# Patient Record
Sex: FEMALE | Race: WHITE | Employment: OTHER | URBAN - METROPOLITAN AREA
[De-identification: names, ages, dates, MRNs, and addresses within clinical notes are randomized per-mention and may not be internally consistent; named-entity substitution may affect disease eponyms.]

---

## 2017-11-20 ENCOUNTER — HOSPITAL ENCOUNTER (OUTPATIENT)
Dept: ULTRASOUND IMAGING | Age: 77
Discharge: HOME | End: 2017-11-20
Attending: NURSE PRACTITIONER | Admitting: NURSE PRACTITIONER

## 2017-11-20 DIAGNOSIS — M79.89 SWELLING OF LIMB: ICD-10-CM

## 2018-03-30 ENCOUNTER — HOSPITAL ENCOUNTER (OUTPATIENT)
Dept: MRI IMAGING | Age: 78
Discharge: HOME | End: 2018-03-30
Attending: NURSE PRACTITIONER | Admitting: NURSE PRACTITIONER

## 2018-03-30 DIAGNOSIS — R51.9 NONINTRACTABLE HEADACHE, UNSPECIFIED CHRONICITY PATTERN, UNSPECIFIED HEADACHE TYPE: ICD-10-CM

## 2019-02-14 ENCOUNTER — HOSPITAL ENCOUNTER (OUTPATIENT)
Dept: GENERAL RADIOLOGY | Age: 79
Discharge: HOME | End: 2019-02-14
Payer: MEDICAID

## 2019-02-14 ENCOUNTER — HOSPITAL ENCOUNTER (OUTPATIENT)
Age: 79
Discharge: HOME | End: 2019-02-14
Payer: MEDICAID

## 2019-02-14 DIAGNOSIS — G89.4 CHRONIC PAIN SYNDROME: ICD-10-CM

## 2019-02-14 PROCEDURE — 72100 X-RAY EXAM L-S SPINE 2/3 VWS: CPT

## 2019-02-14 PROCEDURE — 73030 X-RAY EXAM OF SHOULDER: CPT

## 2019-06-14 ENCOUNTER — APPOINTMENT (OUTPATIENT)
Dept: CT IMAGING | Age: 79
DRG: 134 | End: 2019-06-14
Payer: MEDICAID

## 2019-06-14 ENCOUNTER — APPOINTMENT (OUTPATIENT)
Dept: GENERAL RADIOLOGY | Age: 79
DRG: 134 | End: 2019-06-14
Payer: MEDICAID

## 2019-06-14 ENCOUNTER — HOSPITAL ENCOUNTER (INPATIENT)
Age: 79
LOS: 3 days | Discharge: HOME | DRG: 134 | End: 2019-06-18
Attending: EMERGENCY MEDICINE | Admitting: FAMILY MEDICINE
Payer: MEDICAID

## 2019-06-14 DIAGNOSIS — I26.99 OTHER ACUTE PULMONARY EMBOLISM WITHOUT ACUTE COR PULMONALE (HCC): Primary | ICD-10-CM

## 2019-06-14 LAB
ALBUMIN SERPL-MCNC: 3.4 GM/DL (ref 3.4–5)
ALP BLD-CCNC: 93 IU/L (ref 40–129)
ALT SERPL-CCNC: 10 U/L (ref 10–40)
ANION GAP SERPL CALCULATED.3IONS-SCNC: 13 MMOL/L (ref 4–16)
APTT: 34.2 SECONDS (ref 21.2–33)
AST SERPL-CCNC: 20 IU/L (ref 15–37)
BASOPHILS ABSOLUTE: 0.1 K/CU MM
BASOPHILS RELATIVE PERCENT: 0.5 % (ref 0–1)
BILIRUB SERPL-MCNC: 2 MG/DL (ref 0–1)
BUN BLDV-MCNC: 12 MG/DL (ref 6–23)
CALCIUM SERPL-MCNC: 9 MG/DL (ref 8.3–10.6)
CHLORIDE BLD-SCNC: 91 MMOL/L (ref 99–110)
CO2: 31 MMOL/L (ref 21–32)
CREAT SERPL-MCNC: 0.5 MG/DL (ref 0.6–1.1)
DIFFERENTIAL TYPE: ABNORMAL
EOSINOPHILS ABSOLUTE: 0 K/CU MM
EOSINOPHILS RELATIVE PERCENT: 0.4 % (ref 0–3)
GFR AFRICAN AMERICAN: >60 ML/MIN/1.73M2
GFR NON-AFRICAN AMERICAN: >60 ML/MIN/1.73M2
GLUCOSE BLD-MCNC: 120 MG/DL (ref 70–99)
HCT VFR BLD CALC: 40 % (ref 37–47)
HEMOGLOBIN: 12.8 GM/DL (ref 12.5–16)
IMMATURE NEUTROPHIL %: 0.6 % (ref 0–0.43)
INR BLD: 1.18 INDEX
LYMPHOCYTES ABSOLUTE: 0.6 K/CU MM
LYMPHOCYTES RELATIVE PERCENT: 5.4 % (ref 24–44)
MAGNESIUM: 1.1 MG/DL (ref 1.8–2.4)
MCH RBC QN AUTO: 32.4 PG (ref 27–31)
MCHC RBC AUTO-ENTMCNC: 32 % (ref 32–36)
MCV RBC AUTO: 101.3 FL (ref 78–100)
MONOCYTES ABSOLUTE: 1.9 K/CU MM
MONOCYTES RELATIVE PERCENT: 18.3 % (ref 0–4)
NUCLEATED RBC %: 0 %
PDW BLD-RTO: 15.4 % (ref 11.7–14.9)
PLATELET # BLD: 170 K/CU MM (ref 140–440)
PMV BLD AUTO: 10.8 FL (ref 7.5–11.1)
POTASSIUM SERPL-SCNC: ABNORMAL MMOL/L (ref 3.5–5.1)
PROTHROMBIN TIME: 13.4 SECONDS (ref 9.12–12.5)
RBC # BLD: 3.95 M/CU MM (ref 4.2–5.4)
SEGMENTED NEUTROPHILS ABSOLUTE COUNT: 7.6 K/CU MM
SEGMENTED NEUTROPHILS RELATIVE PERCENT: 74.8 % (ref 36–66)
SODIUM BLD-SCNC: 135 MMOL/L (ref 135–145)
TOTAL IMMATURE NEUTOROPHIL: 0.06 K/CU MM
TOTAL NUCLEATED RBC: 0 K/CU MM
TOTAL PROTEIN: 6.8 GM/DL (ref 6.4–8.2)
TROPONIN T: <0.01 NG/ML
WBC # BLD: 10.1 K/CU MM (ref 4–10.5)

## 2019-06-14 PROCEDURE — 6360000004 HC RX CONTRAST MEDICATION: Performed by: PHYSICIAN ASSISTANT

## 2019-06-14 PROCEDURE — 85730 THROMBOPLASTIN TIME PARTIAL: CPT

## 2019-06-14 PROCEDURE — 83735 ASSAY OF MAGNESIUM: CPT

## 2019-06-14 PROCEDURE — 96375 TX/PRO/DX INJ NEW DRUG ADDON: CPT

## 2019-06-14 PROCEDURE — 6360000002 HC RX W HCPCS: Performed by: PHYSICIAN ASSISTANT

## 2019-06-14 PROCEDURE — 71045 X-RAY EXAM CHEST 1 VIEW: CPT

## 2019-06-14 PROCEDURE — 2580000003 HC RX 258

## 2019-06-14 PROCEDURE — 93005 ELECTROCARDIOGRAM TRACING: CPT | Performed by: EMERGENCY MEDICINE

## 2019-06-14 PROCEDURE — 71275 CT ANGIOGRAPHY CHEST: CPT

## 2019-06-14 PROCEDURE — 84484 ASSAY OF TROPONIN QUANT: CPT

## 2019-06-14 PROCEDURE — 85025 COMPLETE CBC W/AUTO DIFF WBC: CPT

## 2019-06-14 PROCEDURE — 96365 THER/PROPH/DIAG IV INF INIT: CPT

## 2019-06-14 PROCEDURE — 99285 EMERGENCY DEPT VISIT HI MDM: CPT

## 2019-06-14 PROCEDURE — 94640 AIRWAY INHALATION TREATMENT: CPT

## 2019-06-14 PROCEDURE — 6370000000 HC RX 637 (ALT 250 FOR IP): Performed by: PHYSICIAN ASSISTANT

## 2019-06-14 PROCEDURE — 80053 COMPREHEN METABOLIC PANEL: CPT

## 2019-06-14 PROCEDURE — 2580000003 HC RX 258: Performed by: PHYSICIAN ASSISTANT

## 2019-06-14 PROCEDURE — 85610 PROTHROMBIN TIME: CPT

## 2019-06-14 RX ORDER — LIDOCAINE 4 G/G
1 PATCH TOPICAL ONCE
Status: DISCONTINUED | OUTPATIENT
Start: 2019-06-14 | End: 2019-06-15

## 2019-06-14 RX ORDER — MAGNESIUM SULFATE IN WATER 40 MG/ML
2 INJECTION, SOLUTION INTRAVENOUS ONCE
Status: COMPLETED | OUTPATIENT
Start: 2019-06-14 | End: 2019-06-15

## 2019-06-14 RX ORDER — ASPIRIN 81 MG/1
324 TABLET, CHEWABLE ORAL ONCE
Status: COMPLETED | OUTPATIENT
Start: 2019-06-14 | End: 2019-06-14

## 2019-06-14 RX ORDER — CYCLOBENZAPRINE HCL 5 MG
5 TABLET ORAL 2 TIMES DAILY PRN
Qty: 30 TABLET | Refills: 0 | Status: SHIPPED | OUTPATIENT
Start: 2019-06-14 | End: 2019-06-14 | Stop reason: CLARIF

## 2019-06-14 RX ORDER — POTASSIUM CHLORIDE 7.45 MG/ML
10 INJECTION INTRAVENOUS PRN
Status: DISCONTINUED | OUTPATIENT
Start: 2019-06-14 | End: 2019-06-18 | Stop reason: HOSPADM

## 2019-06-14 RX ORDER — SODIUM CHLORIDE 9 MG/ML
INJECTION, SOLUTION INTRAVENOUS
Status: COMPLETED
Start: 2019-06-14 | End: 2019-06-14

## 2019-06-14 RX ORDER — HEPARIN SODIUM 10000 [USP'U]/100ML
18 INJECTION, SOLUTION INTRAVENOUS CONTINUOUS
Status: DISCONTINUED | OUTPATIENT
Start: 2019-06-14 | End: 2019-06-15

## 2019-06-14 RX ORDER — HEPARIN SODIUM 1000 [USP'U]/ML
80 INJECTION, SOLUTION INTRAVENOUS; SUBCUTANEOUS ONCE
Status: COMPLETED | OUTPATIENT
Start: 2019-06-14 | End: 2019-06-15

## 2019-06-14 RX ORDER — HEPARIN SODIUM 1000 [USP'U]/ML
80 INJECTION, SOLUTION INTRAVENOUS; SUBCUTANEOUS PRN
Status: DISCONTINUED | OUTPATIENT
Start: 2019-06-14 | End: 2019-06-14

## 2019-06-14 RX ORDER — HEPARIN SODIUM 1000 [USP'U]/ML
40 INJECTION, SOLUTION INTRAVENOUS; SUBCUTANEOUS PRN
Status: DISCONTINUED | OUTPATIENT
Start: 2019-06-14 | End: 2019-06-15

## 2019-06-14 RX ORDER — SODIUM CHLORIDE 0.9 % (FLUSH) 0.9 %
10 SYRINGE (ML) INJECTION 2 TIMES DAILY
Status: DISCONTINUED | OUTPATIENT
Start: 2019-06-14 | End: 2019-06-18 | Stop reason: HOSPADM

## 2019-06-14 RX ORDER — IPRATROPIUM BROMIDE AND ALBUTEROL SULFATE 2.5; .5 MG/3ML; MG/3ML
1 SOLUTION RESPIRATORY (INHALATION) ONCE
Status: COMPLETED | OUTPATIENT
Start: 2019-06-14 | End: 2019-06-14

## 2019-06-14 RX ORDER — SODIUM CHLORIDE 9 MG/ML
INJECTION, SOLUTION INTRAVENOUS CONTINUOUS
Status: DISCONTINUED | OUTPATIENT
Start: 2019-06-14 | End: 2019-06-16

## 2019-06-14 RX ORDER — CYCLOBENZAPRINE HCL 10 MG
10 TABLET ORAL ONCE
Status: DISCONTINUED | OUTPATIENT
Start: 2019-06-14 | End: 2019-06-14

## 2019-06-14 RX ADMIN — MAGNESIUM SULFATE HEPTAHYDRATE 2 G: 40 INJECTION, SOLUTION INTRAVENOUS at 23:29

## 2019-06-14 RX ADMIN — SODIUM CHLORIDE 1000 ML: 9 INJECTION, SOLUTION INTRAVENOUS at 23:27

## 2019-06-14 RX ADMIN — Medication 10 ML: at 22:58

## 2019-06-14 RX ADMIN — IOPAMIDOL 80 ML: 755 INJECTION, SOLUTION INTRAVENOUS at 22:57

## 2019-06-14 RX ADMIN — IPRATROPIUM BROMIDE AND ALBUTEROL SULFATE 1 AMPULE: .5; 3 SOLUTION RESPIRATORY (INHALATION) at 22:25

## 2019-06-14 RX ADMIN — POTASSIUM CHLORIDE 10 MEQ: 10 INJECTION, SOLUTION INTRAVENOUS at 23:27

## 2019-06-14 RX ADMIN — ASPIRIN 81 MG 324 MG: 81 TABLET ORAL at 22:22

## 2019-06-14 ASSESSMENT — PAIN SCALES - GENERAL: PAINLEVEL_OUTOF10: 8

## 2019-06-15 ENCOUNTER — APPOINTMENT (OUTPATIENT)
Dept: ULTRASOUND IMAGING | Age: 79
DRG: 134 | End: 2019-06-15
Payer: MEDICAID

## 2019-06-15 PROBLEM — I26.99 PULMONARY EMBOLISM AND INFARCTION (HCC): Status: ACTIVE | Noted: 2019-06-15

## 2019-06-15 LAB
APTT: 35.2 SECONDS (ref 21.2–33)
APTT: >240 SECONDS (ref 21.2–33)
HCT VFR BLD CALC: 36.5 % (ref 37–47)
HEMOGLOBIN: 11.4 GM/DL (ref 12.5–16)
MAGNESIUM: 2.5 MG/DL (ref 1.8–2.4)
MCH RBC QN AUTO: 31.9 PG (ref 27–31)
MCHC RBC AUTO-ENTMCNC: 31.2 % (ref 32–36)
MCV RBC AUTO: 102.2 FL (ref 78–100)
PDW BLD-RTO: 15.4 % (ref 11.7–14.9)
PLATELET # BLD: 176 K/CU MM (ref 140–440)
PMV BLD AUTO: 10.9 FL (ref 7.5–11.1)
POTASSIUM SERPL-SCNC: 3.6 MMOL/L (ref 3.5–5.1)
POTASSIUM SERPL-SCNC: ABNORMAL MMOL/L (ref 3.5–5.1)
RBC # BLD: 3.57 M/CU MM (ref 4.2–5.4)
TROPONIN T: <0.01 NG/ML
WBC # BLD: 9 K/CU MM (ref 4–10.5)

## 2019-06-15 PROCEDURE — 94761 N-INVAS EAR/PLS OXIMETRY MLT: CPT

## 2019-06-15 PROCEDURE — 2580000003 HC RX 258: Performed by: EMERGENCY MEDICINE

## 2019-06-15 PROCEDURE — 2580000003 HC RX 258: Performed by: FAMILY MEDICINE

## 2019-06-15 PROCEDURE — 6360000002 HC RX W HCPCS: Performed by: PHYSICIAN ASSISTANT

## 2019-06-15 PROCEDURE — 6360000002 HC RX W HCPCS: Performed by: FAMILY MEDICINE

## 2019-06-15 PROCEDURE — 85730 THROMBOPLASTIN TIME PARTIAL: CPT

## 2019-06-15 PROCEDURE — 84484 ASSAY OF TROPONIN QUANT: CPT

## 2019-06-15 PROCEDURE — 36415 COLL VENOUS BLD VENIPUNCTURE: CPT

## 2019-06-15 PROCEDURE — 93970 EXTREMITY STUDY: CPT

## 2019-06-15 PROCEDURE — 96375 TX/PRO/DX INJ NEW DRUG ADDON: CPT

## 2019-06-15 PROCEDURE — 6370000000 HC RX 637 (ALT 250 FOR IP): Performed by: HOSPITALIST

## 2019-06-15 PROCEDURE — 6370000000 HC RX 637 (ALT 250 FOR IP): Performed by: FAMILY MEDICINE

## 2019-06-15 PROCEDURE — 84132 ASSAY OF SERUM POTASSIUM: CPT

## 2019-06-15 PROCEDURE — 85027 COMPLETE CBC AUTOMATED: CPT

## 2019-06-15 PROCEDURE — 96366 THER/PROPH/DIAG IV INF ADDON: CPT

## 2019-06-15 PROCEDURE — 2060000000 HC ICU INTERMEDIATE R&B

## 2019-06-15 PROCEDURE — 83735 ASSAY OF MAGNESIUM: CPT

## 2019-06-15 PROCEDURE — 2700000000 HC OXYGEN THERAPY PER DAY

## 2019-06-15 PROCEDURE — 2580000003 HC RX 258: Performed by: PHYSICIAN ASSISTANT

## 2019-06-15 PROCEDURE — 96368 THER/DIAG CONCURRENT INF: CPT

## 2019-06-15 PROCEDURE — 93010 ELECTROCARDIOGRAM REPORT: CPT | Performed by: INTERNAL MEDICINE

## 2019-06-15 RX ORDER — HEPARIN SODIUM 1000 [USP'U]/ML
80 INJECTION, SOLUTION INTRAVENOUS; SUBCUTANEOUS PRN
Status: DISCONTINUED | OUTPATIENT
Start: 2019-06-15 | End: 2019-06-15 | Stop reason: SDUPTHER

## 2019-06-15 RX ORDER — ASPIRIN 81 MG/1
81 TABLET, CHEWABLE ORAL DAILY
Status: DISCONTINUED | OUTPATIENT
Start: 2019-06-15 | End: 2019-06-18 | Stop reason: HOSPADM

## 2019-06-15 RX ORDER — HEPARIN SODIUM 1000 [USP'U]/ML
80 INJECTION, SOLUTION INTRAVENOUS; SUBCUTANEOUS ONCE
Status: DISCONTINUED | OUTPATIENT
Start: 2019-06-15 | End: 2019-06-15 | Stop reason: SDUPTHER

## 2019-06-15 RX ORDER — FENTANYL CITRATE 50 UG/ML
50 INJECTION, SOLUTION INTRAMUSCULAR; INTRAVENOUS ONCE
Status: COMPLETED | OUTPATIENT
Start: 2019-06-15 | End: 2019-06-15

## 2019-06-15 RX ORDER — HEPARIN SODIUM 1000 [USP'U]/ML
80 INJECTION, SOLUTION INTRAVENOUS; SUBCUTANEOUS PRN
Status: DISCONTINUED | OUTPATIENT
Start: 2019-06-15 | End: 2019-06-15

## 2019-06-15 RX ORDER — SODIUM CHLORIDE 0.9 % (FLUSH) 0.9 %
10 SYRINGE (ML) INJECTION PRN
Status: DISCONTINUED | OUTPATIENT
Start: 2019-06-15 | End: 2019-06-18 | Stop reason: HOSPADM

## 2019-06-15 RX ORDER — POTASSIUM CHLORIDE 1.5 G/1.77G
10 POWDER, FOR SOLUTION ORAL DAILY
COMMUNITY

## 2019-06-15 RX ORDER — HEPARIN SODIUM 10000 [USP'U]/100ML
18 INJECTION, SOLUTION INTRAVENOUS CONTINUOUS
Status: DISCONTINUED | OUTPATIENT
Start: 2019-06-15 | End: 2019-06-15

## 2019-06-15 RX ORDER — CALCIUM CARBONATE 200(500)MG
500 TABLET,CHEWABLE ORAL 3 TIMES DAILY PRN
Status: DISCONTINUED | OUTPATIENT
Start: 2019-06-15 | End: 2019-06-18 | Stop reason: HOSPADM

## 2019-06-15 RX ORDER — HEPARIN SODIUM 1000 [USP'U]/ML
40 INJECTION, SOLUTION INTRAVENOUS; SUBCUTANEOUS PRN
Status: DISCONTINUED | OUTPATIENT
Start: 2019-06-15 | End: 2019-06-15

## 2019-06-15 RX ORDER — POTASSIUM CHLORIDE 20 MEQ/1
40 TABLET, EXTENDED RELEASE ORAL 2 TIMES DAILY WITH MEALS
Status: COMPLETED | OUTPATIENT
Start: 2019-06-15 | End: 2019-06-15

## 2019-06-15 RX ORDER — SODIUM CHLORIDE 0.9 % (FLUSH) 0.9 %
10 SYRINGE (ML) INJECTION EVERY 12 HOURS SCHEDULED
Status: DISCONTINUED | OUTPATIENT
Start: 2019-06-15 | End: 2019-06-18 | Stop reason: HOSPADM

## 2019-06-15 RX ORDER — MORPHINE SULFATE 4 MG/ML
2 INJECTION, SOLUTION INTRAMUSCULAR; INTRAVENOUS
Status: DISCONTINUED | OUTPATIENT
Start: 2019-06-15 | End: 2019-06-18 | Stop reason: HOSPADM

## 2019-06-15 RX ORDER — ONDANSETRON 2 MG/ML
4 INJECTION INTRAMUSCULAR; INTRAVENOUS EVERY 6 HOURS PRN
Status: DISCONTINUED | OUTPATIENT
Start: 2019-06-15 | End: 2019-06-18 | Stop reason: HOSPADM

## 2019-06-15 RX ORDER — METOPROLOL TARTRATE 50 MG/1
50 TABLET, FILM COATED ORAL 2 TIMES DAILY
Status: DISCONTINUED | OUTPATIENT
Start: 2019-06-15 | End: 2019-06-18 | Stop reason: HOSPADM

## 2019-06-15 RX ORDER — MAGNESIUM 30 MG
200 TABLET ORAL NIGHTLY
COMMUNITY

## 2019-06-15 RX ORDER — FUROSEMIDE 20 MG/1
20 TABLET ORAL 2 TIMES DAILY
Status: DISCONTINUED | OUTPATIENT
Start: 2019-06-15 | End: 2019-06-18 | Stop reason: HOSPADM

## 2019-06-15 RX ORDER — MAGNESIUM SULFATE IN WATER 40 MG/ML
2 INJECTION, SOLUTION INTRAVENOUS ONCE
Status: COMPLETED | OUTPATIENT
Start: 2019-06-15 | End: 2019-06-15

## 2019-06-15 RX ORDER — OXYCODONE HYDROCHLORIDE AND ACETAMINOPHEN 5; 325 MG/1; MG/1
1 TABLET ORAL EVERY 6 HOURS PRN
Status: DISCONTINUED | OUTPATIENT
Start: 2019-06-15 | End: 2019-06-18 | Stop reason: HOSPADM

## 2019-06-15 RX ADMIN — METOPROLOL TARTRATE 50 MG: 50 TABLET ORAL at 09:45

## 2019-06-15 RX ADMIN — OXYCODONE HYDROCHLORIDE AND ACETAMINOPHEN 1 TABLET: 5; 325 TABLET ORAL at 23:42

## 2019-06-15 RX ADMIN — METOPROLOL TARTRATE 50 MG: 50 TABLET ORAL at 20:12

## 2019-06-15 RX ADMIN — ASPIRIN 81 MG 81 MG: 81 TABLET ORAL at 09:45

## 2019-06-15 RX ADMIN — POTASSIUM CHLORIDE 40 MEQ: 20 TABLET, EXTENDED RELEASE ORAL at 09:44

## 2019-06-15 RX ADMIN — FUROSEMIDE 20 MG: 20 TABLET ORAL at 20:12

## 2019-06-15 RX ADMIN — OXYCODONE HYDROCHLORIDE AND ACETAMINOPHEN 1 TABLET: 5; 325 TABLET ORAL at 09:45

## 2019-06-15 RX ADMIN — FENTANYL CITRATE 50 MCG: 50 INJECTION INTRAMUSCULAR; INTRAVENOUS at 00:38

## 2019-06-15 RX ADMIN — Medication 1 MG: at 02:40

## 2019-06-15 RX ADMIN — APIXABAN 5 MG: 5 TABLET, FILM COATED ORAL at 20:12

## 2019-06-15 RX ADMIN — SODIUM CHLORIDE, PRESERVATIVE FREE 10 ML: 5 INJECTION INTRAVENOUS at 09:49

## 2019-06-15 RX ADMIN — SODIUM CHLORIDE, PRESERVATIVE FREE 10 ML: 5 INJECTION INTRAVENOUS at 20:20

## 2019-06-15 RX ADMIN — POTASSIUM CHLORIDE 10 MEQ: 10 INJECTION, SOLUTION INTRAVENOUS at 11:31

## 2019-06-15 RX ADMIN — CALCIUM CARBONATE 500 MG: 500 TABLET, CHEWABLE ORAL at 15:03

## 2019-06-15 RX ADMIN — Medication 10 ML: at 20:20

## 2019-06-15 RX ADMIN — OXYCODONE HYDROCHLORIDE AND ACETAMINOPHEN 1 TABLET: 5; 325 TABLET ORAL at 16:57

## 2019-06-15 RX ADMIN — POTASSIUM CHLORIDE 10 MEQ: 10 INJECTION, SOLUTION INTRAVENOUS at 16:51

## 2019-06-15 RX ADMIN — POTASSIUM CHLORIDE 10 MEQ: 10 INJECTION, SOLUTION INTRAVENOUS at 09:44

## 2019-06-15 RX ADMIN — SODIUM CHLORIDE: 9 INJECTION, SOLUTION INTRAVENOUS at 13:25

## 2019-06-15 RX ADMIN — HEPARIN SODIUM 6900 UNITS: 1000 INJECTION, SOLUTION INTRAVENOUS; SUBCUTANEOUS at 00:44

## 2019-06-15 RX ADMIN — MAGNESIUM SULFATE HEPTAHYDRATE 2 G: 40 INJECTION, SOLUTION INTRAVENOUS at 02:32

## 2019-06-15 RX ADMIN — POTASSIUM CHLORIDE 10 MEQ: 10 INJECTION, SOLUTION INTRAVENOUS at 06:49

## 2019-06-15 RX ADMIN — SODIUM CHLORIDE: 9 INJECTION, SOLUTION INTRAVENOUS at 06:44

## 2019-06-15 RX ADMIN — POTASSIUM CHLORIDE 10 MEQ: 10 INJECTION, SOLUTION INTRAVENOUS at 14:21

## 2019-06-15 RX ADMIN — OXYCODONE HYDROCHLORIDE AND ACETAMINOPHEN 1 TABLET: 5; 325 TABLET ORAL at 02:32

## 2019-06-15 RX ADMIN — POTASSIUM CHLORIDE 40 MEQ: 20 TABLET, EXTENDED RELEASE ORAL at 16:51

## 2019-06-15 RX ADMIN — FUROSEMIDE 20 MG: 20 TABLET ORAL at 09:44

## 2019-06-15 RX ADMIN — FUROSEMIDE 20 MG: 20 TABLET ORAL at 02:32

## 2019-06-15 RX ADMIN — SODIUM CHLORIDE: 9 INJECTION, SOLUTION INTRAVENOUS at 20:16

## 2019-06-15 RX ADMIN — APIXABAN 5 MG: 5 TABLET, FILM COATED ORAL at 13:23

## 2019-06-15 RX ADMIN — POTASSIUM CHLORIDE 10 MEQ: 10 INJECTION, SOLUTION INTRAVENOUS at 05:42

## 2019-06-15 RX ADMIN — HEPARIN SODIUM AND DEXTROSE 18 UNITS/KG/HR: 10000; 5 INJECTION INTRAVENOUS at 00:44

## 2019-06-15 ASSESSMENT — PAIN DESCRIPTION - PROGRESSION

## 2019-06-15 ASSESSMENT — PAIN - FUNCTIONAL ASSESSMENT
PAIN_FUNCTIONAL_ASSESSMENT: PREVENTS OR INTERFERES WITH MANY ACTIVE NOT PASSIVE ACTIVITIES
PAIN_FUNCTIONAL_ASSESSMENT: PREVENTS OR INTERFERES WITH MANY ACTIVE NOT PASSIVE ACTIVITIES
PAIN_FUNCTIONAL_ASSESSMENT: PREVENTS OR INTERFERES SOME ACTIVE ACTIVITIES AND ADLS

## 2019-06-15 ASSESSMENT — PAIN SCALES - GENERAL
PAINLEVEL_OUTOF10: 8
PAINLEVEL_OUTOF10: 6
PAINLEVEL_OUTOF10: 6
PAINLEVEL_OUTOF10: 8
PAINLEVEL_OUTOF10: 6
PAINLEVEL_OUTOF10: 8
PAINLEVEL_OUTOF10: 6
PAINLEVEL_OUTOF10: 8
PAINLEVEL_OUTOF10: 6
PAINLEVEL_OUTOF10: 6
PAINLEVEL_OUTOF10: 5
PAINLEVEL_OUTOF10: 8
PAINLEVEL_OUTOF10: 6
PAINLEVEL_OUTOF10: 6
PAINLEVEL_OUTOF10: 7
PAINLEVEL_OUTOF10: 6

## 2019-06-15 ASSESSMENT — PAIN DESCRIPTION - DESCRIPTORS
DESCRIPTORS: CONSTANT;DISCOMFORT;ACHING;SHARP
DESCRIPTORS: CONSTANT
DESCRIPTORS: DISCOMFORT;ACHING;SHARP

## 2019-06-15 ASSESSMENT — PAIN DESCRIPTION - ONSET
ONSET: GRADUAL
ONSET: ON-GOING
ONSET: GRADUAL

## 2019-06-15 ASSESSMENT — PAIN DESCRIPTION - PAIN TYPE
TYPE: ACUTE PAIN

## 2019-06-15 ASSESSMENT — PAIN DESCRIPTION - LOCATION
LOCATION: ABDOMEN;CHEST
LOCATION: ABDOMEN;CHEST
LOCATION: BACK

## 2019-06-15 ASSESSMENT — PAIN DESCRIPTION - FREQUENCY
FREQUENCY: CONTINUOUS

## 2019-06-15 ASSESSMENT — PAIN DESCRIPTION - ORIENTATION
ORIENTATION: MID
ORIENTATION: LEFT
ORIENTATION: MID

## 2019-06-15 NOTE — ED PROVIDER NOTES
Triage Chief Complaint:   Chest Pain; Shortness of Breath; and Back Pain    [place]:  Jeanette Desir is a 69 y.o. female that presents today to the emergency department complaining of chest pain, flank pain bilateral. Pain with breathing. Sharp in nature bilateral.  Pain with breathing. Been ongoing over the last 3 days. Sharp in nature. No hemoptysis. Patient does have a history of lower extremity DVT she is on Eliquis per patient. She's been eating and drinking baseline eliminating baseline. No abdominal pain. She does endorse shortness of breath. Is along time smoker however quit 4 months ago. Sharp in nature. No hemoptysis. Patient does have a history of lower extremity DVT she is on Eliquis per patient. She is been eating and drinking baseline eliminating baseline. No abdominal pain. She does endorse shortness of breath. Is a long time smoker however quit 4 months ago. ROS:  REVIEW OF SYSTEMS    At least 10 systems reviewed      All other review of systems are negative  See HPI and nursing notes for additional information       Past Medical History:   Diagnosis Date    Arthritis     DVT of deep femoral vein ([facility] [address] 2016    Hypertension     PAD (peripheral artery disease) (HCC)     Pneumonia     Seizures (HCC)     Vertigo      Past Surgical History:   Procedure Laterality Date     SECTION      COLONOSCOPY      ENDOSCOPY, COLON, DIAGNOSTIC       Family History   Problem Relation Age of Onset    Cancer Mother         BREAST BILAT     Social History     Socioeconomic History    Marital status:       Spouse name: Not on file    Number of children: Not on file    Years of education: Not on file    Highest education level: Not on file   Occupational History    Not on file   Social Needs    Financial resource strain: Not on file    Food insecurity:     Worry: Not on file     Inability: Not on file    Transportation needs:     Medical: Not on file     Non-medical: Not on file Tobacco Use    Smoking status: Former Smoker     Packs/day: 0.25     Years: 35.00     Pack years: 8.75   Substance and Sexual Activity    Alcohol use: No     Comment: social    Drug use: No    Sexual activity: Never   Lifestyle    Physical activity:     Days per week: Not on file     Minutes per session: Not on file    Stress: Not on file   Relationships    Social connections:     Talks on phone: Not on file     Gets together: Not on file     Attends [employer] service: Not on file     Active member of club or organization: Not on file     Attends meetings of clubs or organizations: Not on file     Relationship status: Not on file    Intimate partner violence:     Fear of current or ex partner: Not on file     Emotionally abused: Not on file     Physically abused: Not on file     Forced sexual activity: Not on file   Other Topics Concern    Not on file   Social History Narrative    Not on file     Current Facility-Administered Medications   Medication Dose Route Frequency Provider Last Rate Last Dose    fentaNYL (SUBLIMAZE) injection 50 mcg  50 mcg Intravenous Once Maria C Marvin PA-C        sodium chloride flush 0.9 % injection 10 mL  10 mL Intravenous BID Rosendo Sanchez MD   10 mL at 06/14/19 2258    potassium chloride 10 mEq/100 mL IVPB (Peripheral Line)  10 mEq Intravenous PRN Maria C Marvin PA-C 100 mL/hr at 06/14/19 2327 10 mEq at 06/14/19 2327    magnesium sulfate 2 g in 50 mL IVPB premix  2 g Intravenous Once Maria Ccathy Marvin, PA-C 25 mL/hr at 06/14/19 2329 2 g at 06/14/19 2329    heparin (porcine) injection 6,900 Units  80 Units/kg Intravenous Once Maria Ccathy Marvin PA-C        heparin (porcine) injection 3,450 Units  40 Units/kg Intravenous PRN Maria C Marvin, PA-C        heparin 25,000 units in dextrose 5% 250 mL infusion  18 Units/kg/hr Intravenous Continuous Maria C Good, PA-C        0.9 % sodium chloride infusion   Intravenous Continuous Robert LYONS [place], PA-C 100 mL/hr at 06/14/19 2327 1,000 mL at 06/14/19 2327     Current Outpatient Medications   Medication Sig Dispense Refill    metoprolol (LOPRESSOR) 50 MG tablet Take 1 tablet by mouth 2 times daily. 60 tablet 3    furosemide (LASIX) 20 MG tablet Take 20 mg by mouth 2 times daily.  aspirin 81 MG tablet Take 81 mg by mouth daily. Allergies   Allergen Reactions    Ambien [Zolpidem Tartrate] Other (See Comments)     hallucinations    Morphine Sulfate Other (See Comments)     agitation       Nursing Notes Reviewed    Physical Exam:  ED Triage Vitals [06/14/19 2156]   Enc Vitals Group      /82      Pulse 119      Resp 22      Temp 98.9 °F (37.2 °C)      Temp Source Oral      SpO2 94 %      Weight 190 lb (86.2 kg)      Height       Head Circumference       Peak Flow       Pain Score       Pain Loc       Pain Edu? Excl. in [address]? General :Patient is awake alert oriented person place and time no acute distress nontoxic appearing  HEENT: Pupils are equally round and reactive to light extraocular motors are intact conjunctivae clear sclerae white there is no injection no icterus. Nose without any rhinorrhea or epistaxis. Oral mucosa is moist no exudate buccal mucosa shows no ulcerations. Uvula is midline    Neck: Neck is supple full range of motion trachea midline thyroid nonpalpable  Cardiac: Heart regular rate rhythm no murmurs rubs clicks or gallops  Lungs: Lungs are clear to auscultation , distant there is no wheezing rhonchi or rales. tachypnic There is no use of accessory muscles no nasal flaring identified. Chest wall: There is no tenderness to palpation over the chest wall or over ribs  Abdomen: Abdomen is soft nontender nondistended.  There is no firm or pulsatile masses no rebound rigidity or guarding negative Bowen's negative McBurney, no peritoneal signs  Suprapubic:  there is no tenderness to palpation over the external bladder   Musculoskeletal: 5 out of 5 strength in all 4 extremities full flexion extension abduction and adduction supination pronation of all extremities and all digits. No obvious muscle atrophy is noted.  No focal muscle deficits are appreciated  Dermatology: Skin is warm and dry there is no obvious abscesses lacerations or lesions noted  Psych: Mentation is grossly normal cognition is grossly normal. Affect is appropriate        I have reviewed and interpreted all of the currently available lab results from this visit (if applicable):  Results for orders placed or performed during the hospital encounter of 06/14/19   CBC auto diff   Result Value Ref Range    WBC 10.1 4.0 - 10.5 K/CU MM    RBC 3.95 (L) 4.2 - 5.4 M/CU MM    Hemoglobin 12.8 12.5 - 16.0 GM/DL    Hematocrit 40.0 37 - 47 %    .3 (H) 78 - 100 FL    MCH 32.4 (H) 27 - 31 PG    MCHC 32.0 32.0 - 36.0 %    RDW 15.4 (H) 11.7 - 14.9 %    Platelets 288 522 - 126 K/CU MM    MPV 10.8 7.5 - 11.1 FL    Differential Type AUTOMATED DIFFERENTIAL     Segs Relative 74.8 (H) 36 - 66 %    Lymphocytes % 5.4 (L) 24 - 44 %    Monocytes % 18.3 (H) 0 - 4 %    Eosinophils % 0.4 0 - 3 %    Basophils % 0.5 0 - 1 %    Segs Absolute 7.6 K/CU MM    Lymphocytes # 0.6 K/CU MM    Monocytes # 1.9 K/CU MM    Eosinophils # 0.0 K/CU MM    Basophils # 0.1 K/CU MM    Nucleated RBC % 0.0 %    Total Nucleated RBC 0.0 K/CU MM    Total Immature Neutrophil 0.06 K/CU MM    Immature Neutrophil % 0.6 (H) 0 - 0.43 %   CMP   Result Value Ref Range    Sodium 135 135 - 145 MMOL/L    Potassium (LL) 3.5 - 5.1 MMOL/L     2.8  K CALLED TO DR LOPES [facility] IN ERT AT 2235 ON [id number] BY MARIELENA LINDQUIST MT  RESULTS READ BACK      Chloride 91 (L) 99 - 110 mMol/L    CO2 31 21 - 32 MMOL/L    BUN 12 6 - 23 MG/DL    CREATININE 0.5 (L) 0.6 - 1.1 MG/DL    Glucose 120 (H) 70 - 99 MG/DL    Calcium 9.0 8.3 - 10.6 MG/DL    Alb 3.4 3.4 - 5.0 GM/DL    Total Protein 6.8 6.4 - 8.2 GM/DL    Total Bilirubin 2.0 (H) 0.0 - 1.0 MG/DL    ALT 10 10 - 40 U/L    AST 20 15 - 37 IU/L Alkaline Phosphatase 93 40 - 129 IU/L    GFR Non-African American >60 >60 mL/min/1.73m2    GFR African American >60 >60 mL/min/1.73m2    Anion Gap 13 4 - 16   Troponin   Result Value Ref Range    Troponin T <0.010 <0.01 NG/ML   Magnesium   Result Value Ref Range    Magnesium 1.1 (L) 1.8 - 2.4 mg/dl   Protime/INR & PTT   Result Value Ref Range    Protime 13.4 (H) 9.12 - 12.5 SECONDS    INR 1.18 INDEX    aPTT 34.2 (H) 21.2 - 33.0 SECONDS   EKG 12 Lead   Result Value Ref Range    Ventricular Rate 113 BPM    Atrial Rate 113 BPM    P-R Interval 140 ms    QRS Duration 88 ms    Q-T Interval 364 ms    QTc Calculation (Bazett) 499 ms    P Axis 66 degrees    R Axis 49 degrees    T Axis 67 degrees    Diagnosis       Sinus tachycardia  Possible Left atrial enlargement  Nonspecific ST and T wave abnormality  Abnormal ECG  When compared with ECG of 13-JUL-2016 16:06,  Vent. rate has increased BY  48 BPM  T wave inversion now evident in Anterior leads        Radiographs (if obtained):  [] The following radiograph was interpreted by myself in the absence of a radiologist:   [] Radiologist's Report Reviewed:  CTA PULMONARY W CONTRAST   Final Result   Left lower lobe PE with probable left lower lobe infarct. Mild left pleural effusion. Findings were discussed with Maria E Deulna at 11:30 pm on 6/14/2019. XR CHEST PORTABLE   Preliminary Result   Trace left pleural effusion along with minimal left basilar atelectasis or   developing infiltrate. EKG (if obtained):   Please See Note of attending physician for EKG interpretation. Chart review shows recent radiograph(s):  No results found. MDM:   Patient presents today with shortness of breath initially tachycardic tachypneic. I had high suspicion for pulmonary embolism CT angiogram of the lungs was ordered which did show a left lower lobe pulmonary embolism. Patient is on Eliquis so heparin drip is initiated here in the ED.   Incidental finding of a hypokalemia of 2.8 as well as a hypomagnesemia of 1.1 is noted. EKG did reveal a prolonged QTC of 640. Potassium and magnesium supplementation are provided here in the ED IV. Patient remains hemodynamically stable. Vital signs did normalize while here in the ED the patient remains comfortable will be admitted to the hospital.  On-call physician Was consulted regarding patient. After thorough discussion regarding patient's history, physical exam.  laboratory values, radiographic evidence (if applicable  theymyself as well as my attending physician agreed Given the patient's presenting concerns, medical history and clinical findings, the patient will be admitted at this time to undergo further evaluation and disposition. . During patient's entire stay in the ED patient remained stable and comfortable. Analgesia is well-controlled. Patient will be admitted for all information regarding ongoing management and care of patient please see note of Admitting physician. I saw this patient ine conjunction with Attending Physician Dr. Curt Low    All EKG interpretations are performed by Attending physician            /64   Pulse 100   Temp 98.9 °F (37.2 °C) (Oral)   Resp 25   Ht 5' 10.08\" (1.78 m)   Wt 190 lb (86.2 kg)   SpO2 98%   BMI 27.20 kg/m²       Clinical Impression:  1. Other acute pulmonary embolism without acute cor pulmonale ([address]        Disposition referral (if applicable):  ISHA Underwood - CNP  [place]  [phone]    In 2 days      Disposition medications (if applicable):  Current Discharge Medication List            Comment: Please note this report has been produced using speech recognition software and may contain errors related to that system including errors in grammar, punctuation, and spelling, as well as words and phrases that may be inappropriate.  If there are any questions or concerns please feel free to contact the dictating provider for

## 2019-06-15 NOTE — ED PROVIDER NOTES
Emergency [address] EMERGENCY DEPARTMENT    Patient: Cate Sands  MRN: [id number]  : 1940  Date of Evaluation: 2019  ED Supervising Physician: Nick Rivero MD    I independently examined and evaluated [place]. In brief, Cate Sands is a 69 y.o. female that presents to the emergency department with 1 week of worsening left chest and back pain that is pleuritic and shortness of breath. History of DVT currently on Eliquis. Focused exam: Well-appearing patient in no acute distress. Cardiac exam reveals tachycardia without any murmurs. Lungs sounds are clear bilaterally with no increased work of breathing. Abdomen is soft, nontender, nondistended. Brief ED course/MDM: Patient is in no acute distress on my evaluation. She is not requiring supplemental oxygen but does have a saturation of about 94% on room air. CTA consistent with left lower lobe pulmonary embolism. No obvious evidence of acute right heart strain given normal troponin and CT findings. Patient is hypokalemic and hypomagnesemic. She has a prolonged QTC on her EKG. She is kept on telemetry and given potassium and magnesium replacement. Plan for heparin drip, admission for further management. EKG: Tachycardia with normal axis. ST depression in the inferior lateral leads. No ST elevation. No pathologic Q waves. QTc is prolonged at 641. All diagnostic, treatment, and disposition decisions were made by myself in conjunction with the KELLY. For all further details of the patient's emergency department visit, please see their documentation.     (Please note that portions of this note may have been completed with a voice recognition program. Efforts were made to edit the dictations but occasionally words are mis-transcribed.)    MD Javier Burks MD  [id number]

## 2019-06-15 NOTE — CONSULTS
Subjective:   CHIEF COMPLAINT / HPI:  69year old female admitted with worsening sob. She also c/o pleuritic chest pain. She has mild cough without any sputum production. she was diagnosed with dvt severe years ago but was not using eliquis regularly.       Past Medical History:  Past Medical History:   Diagnosis Date    Arthritis     DVT of deep femoral vein ([facility] [address] 2016    Hypertension     PAD (peripheral artery disease) (HCC)     Pneumonia     Seizures (HCC)     Vertigo        Past Surgical History:        Procedure Laterality Date     SECTION      COLONOSCOPY      ENDOSCOPY, COLON, DIAGNOSTIC         Current Medications:    Current Facility-Administered Medications: metoprolol tartrate (LOPRESSOR) tablet 50 mg, 50 mg, Oral, BID  furosemide (LASIX) tablet 20 mg, 20 mg, Oral, BID  aspirin chewable tablet 81 mg, 81 mg, Oral, Daily  sodium chloride flush 0.9 % injection 10 mL, 10 mL, Intravenous, 2 times per day  sodium chloride flush 0.9 % injection 10 mL, 10 mL, Intravenous, PRN  magnesium hydroxide (MILK OF MAGNESIA) 400 MG/5ML suspension 30 mL, 30 mL, Oral, Daily PRN  ondansetron (ZOFRAN) injection 4 mg, 4 mg, Intravenous, Q6H PRN  potassium chloride (KLOR-CON M) extended release tablet 40 mEq, 40 mEq, Oral, BID WC  morphine sulfate (PF) injection 2 mg, 2 mg, Intravenous, Q3H PRN  oxyCODONE-acetaminophen (PERCOCET) 5-325 MG per tablet 1 tablet, 1 tablet, Oral, Q6H PRN  heparin (porcine) injection 7,070 Units, 80 Units/kg, Intravenous, PRN  melatonin ER tablet 1 mg, 1 mg, Oral, Nightly PRN  sodium chloride flush 0.9 % injection 10 mL, 10 mL, Intravenous, BID  potassium chloride 10 mEq/100 mL IVPB (Peripheral Line), 10 mEq, Intravenous, PRN  heparin (porcine) injection 3,450 Units, 40 Units/kg, Intravenous, PRN  heparin 25,000 units in dextrose 5% 250 mL infusion, 18 Units/kg/hr, Intravenous, Continuous  0.9 % sodium chloride infusion, , Intravenous, Continuous    Allergies   Allergen with probable left lower lobe infarct.       Mild left pleural effusion.       Findings were discussed with VAHE VALDEZ at 11:30 pm on 6/14/2019.           DATA:                        Assessment:     1. Acute pulm embolism lll  2. Pulmonary infarction  3. H/o dvt          Plan:     1. D/w pt  2. Advised to use eliquis regularly and she promises to do it. 3. I believe eliquis or xarelto,either one of them will be fine  4. She will need indefinite antocoagulation. Pain control  5.  Thanks will follow

## 2019-06-15 NOTE — ED TRIAGE NOTES
Patient arrived to ED via EMS for lower back pain and chest pain when breathing. Patient stated that it started 4 days ago. Patient stated that all movement makes the pain worse.

## 2019-06-15 NOTE — PROGRESS NOTES
Received report from [facility]. 2 person skin assessment completed. No open areas noted. Pt has a bruise to the top of coccyx on the lower back.

## 2019-06-15 NOTE — ED NOTES
Patient's medications:  Eliquis  Potassium  Magnesium  ASA  Metoprolol  Hydrocodone     Liz Zuniga RN  06/15/19 0109

## 2019-06-15 NOTE — H&P
colon, diagnostic; and Colonoscopy. Allergies: Allergies   Allergen Reactions    Ambien [Zolpidem Tartrate] Other (See Comments)     hallucinations    Morphine Sulfate Other (See Comments)     agitation       FAM HX: Reviewed and non-contributory  Soc HX:   Social History     Socioeconomic History    Marital status:       Spouse name: Not on file    Number of children: Not on file    Years of education: Not on file    Highest education level: Not on file   Occupational History    Not on file   Social Needs    Financial resource strain: Not on file    Food insecurity:     Worry: Not on file     Inability: Not on file    Transportation needs:     Medical: Not on file     Non-medical: Not on file   Tobacco Use    Smoking status: Former Smoker     Packs/day: 0.25     Years: 35.00     Pack years: 8.75   Substance and Sexual Activity    Alcohol use: No     Comment: social    Drug use: No    Sexual activity: Never   Lifestyle    Physical activity:     Days per week: Not on file     Minutes per session: Not on file    Stress: Not on file   Relationships    Social connections:     Talks on phone: Not on file     Gets together: Not on file     Attends Anabaptism service: Not on file     Active member of club or organization: Not on file     Attends meetings of clubs or organizations: Not on file     Relationship status: Not on file    Intimate partner violence:     Fear of current or ex partner: Not on file     Emotionally abused: Not on file     Physically abused: Not on file     Forced sexual activity: Not on file   Other Topics Concern    Not on file   Social History Narrative    Not on file       LABS  Recent Labs     06/14/19 2200   WBC 10.1   HGB 12.8   HCT 40.0         Recent Labs     06/14/19 2200      K 2.8  K CALLED TO DR PINON IN ERT AT 2235 ON [id number] BY MARIELENA LINDQUIST MT  RESULTS READ BACK  *   CL 91*   CO2 31   BUN 12   CREATININE 0.5*     Recent Labs     06/14/19 2200 AST 20   ALT 10   BILITOT 2.0*   ALKPHOS 93     Recent Labs     06/14/19  2200   INR 1.18     Recent Labs     06/14/19  2200   TROPONINT <0.010        Xr Chest Portable    Result Date: 6/14/2019  EXAMINATION: ONE XRAY VIEW OF THE CHEST 6/14/2019 10:21 pm COMPARISON: CTA chest 07/13/2016. HISTORY: ORDERING SYSTEM PROVIDED HISTORY: short of breath TECHNOLOGIST PROVIDED HISTORY: Reason for exam:->short of breath Ordering Physician Provided Reason for Exam: SOB Acuity: Acute Type of Exam: Initial FINDINGS: Minimal blunting left costophrenic angle along with minimal patchy/streaky opacity to left lung base concerning for small left pleural effusion and associated atelectasis or developing infiltrate. Remainder of the lungs appear to be clear. No pulmonary edema or pneumothoraces. Cardiac and mediastinal silhouettes are within normal limits and stable. No acute bony abnormalities. Trace left pleural effusion along with minimal left basilar atelectasis or developing infiltrate. Cta Pulmonary W Contrast    Result Date: 6/14/2019  EXAMINATION: CTA OF THE CHEST 6/14/2019 11:19 pm TECHNIQUE: CTA of the chest was performed after the administration of intravenous contrast.  Multiplanar reformatted images are provided for review. MIP images are provided for review. Dose modulation, iterative reconstruction, and/or weight based adjustment of the mA/kV was utilized to reduce the radiation dose to as low as reasonably achievable. COMPARISON: None. HISTORY: ORDERING SYSTEM PROVIDED HISTORY: sob TECHNOLOGIST PROVIDED HISTORY: Ordering Physician Provided Reason for Exam: sob, lt. sided chest pain. Acuity: Acute Type of Exam: Initial Additional signs and symptoms: no Relevant Medical/Surgical History:  80 ml isovue 370 used FINDINGS: Pulmonary Arteries: There is a filling defect within a left lower lobe pulmonary artery branch consistent with PE.   No saddle embolus is identified and the heart size is normal. Mediastinum: No evidence of mediastinal lymphadenopathy. The heart and pericardium demonstrate no acute abnormality. There is no acute abnormality of the thoracic aorta. Lungs/pleura: There is a patchy infiltrate within the left lower lobe with a mild left pleural effusion. Upper Abdomen: No acute abnormality identified. Soft Tissues/Bones: No acute bone or soft tissue abnormality. Left lower lobe PE with probable left lower lobe infarct. Mild left pleural effusion. Findings were discussed with Lacie Atwood at 11:30 pm on 6/14/2019.        Electronically signed by Eri Gamboa MD on 6/15/2019 at 12:31 AM

## 2019-06-15 NOTE — ED NOTES
CTA PULMONARY W CONTRAST   Status: Final result   Order Providers     KRISS Kinsey MD          Signed by     Signed Date/Time  Phone Pager   Burton Najera 6/14/2019 23:33 [phone]    [facility]     Read Date Phone Pager   Burton Najera Jun 14, 2019 [phone]    Radiation Dose Estimates     No radiation information found for this patient   Narrative   EXAMINATION:   CTA OF THE CHEST 6/14/2019 11:19 pm       TECHNIQUE:   CTA of the chest was performed after the administration of intravenous   contrast.  Multiplanar reformatted images are provided for review.  MIP   images are provided for review. Dose modulation, iterative reconstruction,   and/or weight based adjustment of the mA/kV was utilized to reduce the   radiation dose to as low as reasonably achievable.       COMPARISON:   None.       HISTORY:   ORDERING SYSTEM PROVIDED HISTORY: sob   TECHNOLOGIST PROVIDED HISTORY:   Ordering Physician Provided Reason for Exam: sob, lt. sided chest pain. Acuity: Acute   Type of Exam: Initial   Additional signs and symptoms: no   Relevant Medical/Surgical History:  80 ml isovue 370 used       FINDINGS:   Pulmonary Arteries: There is a filling defect within a left lower lobe   pulmonary artery branch consistent with PE.  No saddle embolus is identified   and the heart size is normal.       Mediastinum: No evidence of mediastinal lymphadenopathy.  The heart and   pericardium demonstrate no acute abnormality.  There is no acute abnormality   of the thoracic aorta.       Lungs/pleura:  There is a patchy infiltrate within the left lower lobe with a   mild left pleural effusion.       Upper Abdomen: No acute abnormality identified.       Soft Tissues/Bones: No acute bone or soft tissue abnormality.           Impression   Left lower lobe PE with probable left lower lobe infarct.       Mild left pleural effusion.       Findings were discussed with VAHE VALDEZ at 11:30 pm on 6/14/2019.           Order History     Open Order Details   PACS Images      Show images for CTA PULMONARY W CONTRAST   Results History Report     View Report   External Result Report     External Result Report   Existing Charges      Charge Line Charge Status Charge Trigger Charge Type   [id number] CT ANGIO CHEST W & W/O CONT [place] [facility] end exam Technical   [id number] ANGIO, CHEST, COMBO, INCL IMAGE PROC Deleted Imaging result study Professional   Order Report      Order Details        Joanne Dolan RN  06/15/19 0950

## 2019-06-16 LAB
ANION GAP SERPL CALCULATED.3IONS-SCNC: 10 MMOL/L (ref 4–16)
BUN BLDV-MCNC: 7 MG/DL (ref 6–23)
CALCIUM SERPL-MCNC: 8 MG/DL (ref 8.3–10.6)
CHLORIDE BLD-SCNC: 100 MMOL/L (ref 99–110)
CO2: 29 MMOL/L (ref 21–32)
CREAT SERPL-MCNC: 0.6 MG/DL (ref 0.6–1.1)
GFR AFRICAN AMERICAN: >60 ML/MIN/1.73M2
GFR NON-AFRICAN AMERICAN: >60 ML/MIN/1.73M2
GLUCOSE BLD-MCNC: 93 MG/DL (ref 70–99)
HCT VFR BLD CALC: 36.4 % (ref 37–47)
HEMOGLOBIN: 11.1 GM/DL (ref 12.5–16)
MCH RBC QN AUTO: 32.2 PG (ref 27–31)
MCHC RBC AUTO-ENTMCNC: 30.5 % (ref 32–36)
MCV RBC AUTO: 105.5 FL (ref 78–100)
PDW BLD-RTO: 15.8 % (ref 11.7–14.9)
PLATELET # BLD: 194 K/CU MM (ref 140–440)
PMV BLD AUTO: 11 FL (ref 7.5–11.1)
POTASSIUM SERPL-SCNC: 3.9 MMOL/L (ref 3.5–5.1)
RBC # BLD: 3.45 M/CU MM (ref 4.2–5.4)
SODIUM BLD-SCNC: 139 MMOL/L (ref 135–145)
WBC # BLD: 8.1 K/CU MM (ref 4–10.5)

## 2019-06-16 PROCEDURE — 2580000003 HC RX 258: Performed by: PHYSICIAN ASSISTANT

## 2019-06-16 PROCEDURE — 85027 COMPLETE CBC AUTOMATED: CPT

## 2019-06-16 PROCEDURE — 6370000000 HC RX 637 (ALT 250 FOR IP): Performed by: FAMILY MEDICINE

## 2019-06-16 PROCEDURE — 2580000003 HC RX 258: Performed by: EMERGENCY MEDICINE

## 2019-06-16 PROCEDURE — 36415 COLL VENOUS BLD VENIPUNCTURE: CPT

## 2019-06-16 PROCEDURE — 94761 N-INVAS EAR/PLS OXIMETRY MLT: CPT

## 2019-06-16 PROCEDURE — 6370000000 HC RX 637 (ALT 250 FOR IP): Performed by: HOSPITALIST

## 2019-06-16 PROCEDURE — 2060000000 HC ICU INTERMEDIATE R&B

## 2019-06-16 PROCEDURE — 2700000000 HC OXYGEN THERAPY PER DAY

## 2019-06-16 PROCEDURE — 2580000003 HC RX 258: Performed by: FAMILY MEDICINE

## 2019-06-16 PROCEDURE — 80048 BASIC METABOLIC PNL TOTAL CA: CPT

## 2019-06-16 RX ORDER — IPRATROPIUM BROMIDE AND ALBUTEROL SULFATE 2.5; .5 MG/3ML; MG/3ML
1 SOLUTION RESPIRATORY (INHALATION)
Status: DISCONTINUED | OUTPATIENT
Start: 2019-06-17 | End: 2019-06-18 | Stop reason: HOSPADM

## 2019-06-16 RX ADMIN — SODIUM CHLORIDE: 9 INJECTION, SOLUTION INTRAVENOUS at 04:56

## 2019-06-16 RX ADMIN — SODIUM CHLORIDE, PRESERVATIVE FREE 10 ML: 5 INJECTION INTRAVENOUS at 08:47

## 2019-06-16 RX ADMIN — OXYCODONE HYDROCHLORIDE AND ACETAMINOPHEN 1 TABLET: 5; 325 TABLET ORAL at 08:47

## 2019-06-16 RX ADMIN — Medication 10 ML: at 20:56

## 2019-06-16 RX ADMIN — SODIUM CHLORIDE, PRESERVATIVE FREE 10 ML: 5 INJECTION INTRAVENOUS at 20:56

## 2019-06-16 RX ADMIN — FUROSEMIDE 20 MG: 20 TABLET ORAL at 20:27

## 2019-06-16 RX ADMIN — ASPIRIN 81 MG 81 MG: 81 TABLET ORAL at 08:47

## 2019-06-16 RX ADMIN — FUROSEMIDE 20 MG: 20 TABLET ORAL at 08:47

## 2019-06-16 RX ADMIN — APIXABAN 5 MG: 5 TABLET, FILM COATED ORAL at 20:27

## 2019-06-16 RX ADMIN — METOPROLOL TARTRATE 50 MG: 50 TABLET ORAL at 20:27

## 2019-06-16 RX ADMIN — OXYCODONE HYDROCHLORIDE AND ACETAMINOPHEN 1 TABLET: 5; 325 TABLET ORAL at 20:27

## 2019-06-16 RX ADMIN — METOPROLOL TARTRATE 50 MG: 50 TABLET ORAL at 08:47

## 2019-06-16 RX ADMIN — APIXABAN 5 MG: 5 TABLET, FILM COATED ORAL at 08:47

## 2019-06-16 ASSESSMENT — PAIN SCALES - GENERAL
PAINLEVEL_OUTOF10: 5
PAINLEVEL_OUTOF10: 4
PAINLEVEL_OUTOF10: 5
PAINLEVEL_OUTOF10: 6
PAINLEVEL_OUTOF10: 5
PAINLEVEL_OUTOF10: 7
PAINLEVEL_OUTOF10: 9
PAINLEVEL_OUTOF10: 4
PAINLEVEL_OUTOF10: 5
PAINLEVEL_OUTOF10: 6
PAINLEVEL_OUTOF10: 5
PAINLEVEL_OUTOF10: 5
PAINLEVEL_OUTOF10: 8
PAINLEVEL_OUTOF10: 8
PAINLEVEL_OUTOF10: 5
PAINLEVEL_OUTOF10: 4

## 2019-06-16 ASSESSMENT — PAIN - FUNCTIONAL ASSESSMENT: PAIN_FUNCTIONAL_ASSESSMENT: PREVENTS OR INTERFERES SOME ACTIVE ACTIVITIES AND ADLS

## 2019-06-16 ASSESSMENT — PAIN DESCRIPTION - ORIENTATION
ORIENTATION: MID
ORIENTATION: MID

## 2019-06-16 ASSESSMENT — PAIN DESCRIPTION - PAIN TYPE
TYPE: ACUTE PAIN
TYPE: ACUTE PAIN

## 2019-06-16 ASSESSMENT — PAIN DESCRIPTION - PROGRESSION
CLINICAL_PROGRESSION: NOT CHANGED

## 2019-06-16 ASSESSMENT — PAIN DESCRIPTION - ONSET: ONSET: GRADUAL

## 2019-06-16 ASSESSMENT — PAIN DESCRIPTION - LOCATION
LOCATION: BACK
LOCATION: BACK

## 2019-06-16 ASSESSMENT — PAIN DESCRIPTION - FREQUENCY: FREQUENCY: CONTINUOUS

## 2019-06-16 ASSESSMENT — PAIN DESCRIPTION - DESCRIPTORS: DESCRIPTORS: CONSTANT

## 2019-06-16 NOTE — PROGRESS NOTES
Received report from [facility]. 2 person skin assessment completed. No open areas noted. Pt has a bruise to the top of coccyx on the lower back.

## 2019-06-16 NOTE — PROGRESS NOTES
Pt asking to walk after dinner. States she is too exhausted and would like to rest before. Pt walked to the bathroom and got cleaned up. She was independent and her O2 stayed above 91.

## 2019-06-16 NOTE — PROGRESS NOTES
pulmonary      SUBJECTIVE: she feels little better     OBJECTIVE    VITALS:  BP (!) 149/61   Pulse 91   Temp 98.1 °F (36.7 °C) (Oral)   Resp 18   Ht 5' 11\" (1.803 m)   Wt 195 lb 11.2 oz (88.8 kg)   SpO2 90%   BMI 27.29 kg/m²   HEAD AND FACE EXAM:  No throat injection, no active exudate,no thrush  NECK EXAM;No JVD, no masses, symmetrical  CHEST EXAM; Expansion equal and symmetrical, no masses  LUNG EXAM; Good breath sounds bilaterally. There are expiratory wheezes both lungs, there are crackles at both lung bases  CARDIOVASCULAR EXAM: Positive S1 and S2, no S3 or S4, no clicks ,no murmurs  RIGHT AND LEFT LOWER EXTRIMITY EXAM: No edema, no swelling, no inflamation  CNS EXAM: Alert and oriented X3          LABS   Lab Results   Component Value Date    WBC 8.1 06/16/2019    HGB 11.1 (L) 06/16/2019    HCT 36.4 (L) 06/16/2019    .5 (H) 06/16/2019     06/16/2019     Lab Results   Component Value Date    CREATININE 0.6 06/16/2019    BUN 7 06/16/2019     06/16/2019    K 3.9 06/16/2019     06/16/2019    CO2 29 06/16/2019     Lab Results   Component Value Date    INR 1.18 06/14/2019    PROTIME 13.4 (H) 06/14/2019          Lab Results   Component Value Date    PHOS 2.7 12/18/2010      No results for input(s): PH, PO2ART, FCC9ZON, HCO3, BEART, O2SAT in the last 72 hours. Wt Readings from Last 3 Encounters:   06/16/19 195 lb 11.2 oz (88.8 kg)   07/13/16 205 lb (93 kg)   03/26/15 225 lb (102.1 kg)               ASSESMENT  Ac pe  pulm infarction        PLAN  1. cpm  2. Agree with eliquis  3.  Pain control    6/16/2019  Mary Pro M.D.

## 2019-06-16 NOTE — PROGRESS NOTES
Hospitalist Progress Note      Name:  Van Perez /Age/Sex: 1940  (69 y.o. female)   MRN & CSN:  [id number] & [id number] Admission Date/Time: 2019 10:00 PM   Location:  -A PCP: Kenyon Douglas 112 Day: 3    Assessment and Plan:   Van Perez is a 69 y.o.  female  who presents with chest pain      LLL PE with infarct  -already was on eliquis for hx of DVT but noncompliant  -hemodynamically stable, doubt right heart strain  -normal trop  -dced heparin drip  -restart Eliquis     Chest Pain, SOB  - due to above  - initial trop neg     Hypomagnesemia  -resolved     Hypokalemia  -resolved    PAD  HTN  DVT        Diet DIET GENERAL;   DVT Prophylaxis [] eliquis   GI Prophylaxis [] PPI, [] H2 Blocker, [x] No GI prophylaxis, patient is receiving diet/Tube Feeds   Code Status Full Code             History of Present Illness:     Pt S&E. Still has some chest pain with deep breathing. O2 sat 92-94 at rest.      Ten point ROS reviewed negative, unless as noted above    Objective: Intake/Output Summary (Last 24 hours) at 2019 1425  Last data filed at 2019 0930  Gross per 24 hour   Intake 1871.73 ml   Output 1875 ml   Net -3.27 ml      Vitals:   Vitals:    19 1113   BP: 126/86   Pulse: 76   Resp: 20   Temp: 97.7 °F (36.5 °C)   SpO2: 93%     Physical Exam:    GEN Awake female, in mild distress due to pain. RESP Clear to auscultation, no wheezes, rales or rhonchi. Symmetric chest movement while on room air. CARDIO/VASC S1/S2 auscultated. Regular rate without appreciable murmurs, rubs, or gallops. No peripheral edema. GI Abdomen is soft without significant tenderness, masses, or guarding. Bowel sounds are normoactive. MSK No gross joint deformities. Spontaneous movement of all extremities  SKIN Normal coloration, warm, dry. NEURO Cranial nerves appear grossly intact, normal speech, no lateralizing weakness.   PSYCH Awake, alert, Affect appropriate.     Medications:   Medications:    metoprolol tartrate  50 mg Oral BID    furosemide  20 mg Oral BID    aspirin  81 mg Oral Daily    sodium chloride flush  10 mL Intravenous 2 times per day    apixaban  5 mg Oral BID    sodium chloride flush  10 mL Intravenous BID      Infusions:   PRN Meds:   sodium chloride flush 10 mL PRN   magnesium hydroxide 30 mL Daily PRN   ondansetron 4 mg Q6H PRN   morphine 2 mg Q3H PRN   oxyCODONE-acetaminophen 1 tablet Q6H PRN   melatonin ER 1 mg Nightly PRN   calcium carbonate 500 mg TID PRN   potassium chloride 10 mEq PRN         Electronically signed by Jay Mcgarry MD on 6/16/2019 at 2:25 PM

## 2019-06-16 NOTE — PLAN OF CARE
Problem: Falls - Risk of:  Goal: Will remain free from falls  Description  Will remain free from falls  6/16/2019 1034 by Epifanio Price RN  Outcome: Ongoing  6/16/2019 0222 by Mookie Dolan RN  Outcome: Ongoing  Goal: Absence of physical injury  Description  Absence of physical injury  6/16/2019 1034 by Epifanio Price RN  Outcome: Ongoing  6/16/2019 0222 by Mookie Dolan RN  Outcome: Ongoing     Problem: Pain:  Goal: Pain level will decrease  Description  Pain level will decrease  6/16/2019 1034 by Epifanio Price RN  Outcome: Ongoing  6/16/2019 0222 by Mookie Dolan RN  Outcome: Ongoing  Goal: Control of acute pain  Description  Control of acute pain  6/16/2019 1034 by Epifanio Price RN  Outcome: Ongoing  6/16/2019 0222 by Mookie Dolan RN  Outcome: Ongoing  Goal: Control of chronic pain  Description  Control of chronic pain  6/16/2019 1034 by Epifanio Price RN  Outcome: Ongoing  6/16/2019 0222 by Mookie Dolan RN  Outcome: Ongoing

## 2019-06-16 NOTE — PROGRESS NOTES
Pt resting quietly in bed sleeping. Pt was asked again if she wanted to walk. She refused. States she feels too bad at this time. Will pass along to night shift. Call light in reach, bed in lowest position, bed alarm on.

## 2019-06-16 NOTE — PROGRESS NOTES
Pt states that she feels sick to her stomach and just doesn't feel good. She is refusing to walk at this time.

## 2019-06-16 NOTE — PROGRESS NOTES
6/16/2019 11:38 AM  Patient Room #: 2011/2011-A  Patient Name: Erin Kovacs    (Step 1 Done by RN if possible otherwise call Pulmonary Diagnostics)  1. Place patient on room air at rest for at least 30 minutes. If patient falls below 88% before 30 minutes then you can record the level and stop. Record room air saturation level  94 %. If patient is at 88% or below, they will qualify for home oxygen and you can stop. If level does not fall below 88%, fill in level above. If indicated continue to Step 2. Signature: Izabella Cunningham  Date: 6/17/2019  (Step 2&3 Done by [facility])  2. Ambulate patient on room air until saturation falls below 89%. Record level of room air saturation with ambulation 91 %. Next, place patient back on ___lpm oxygen and ambulate, record level __%. (Note:  this level must show improvement from room air level done with ambulation.)  If patients saturation on room air with ambulation is 88% or below AND patient shows improvement with oxygen during ambulation, they will qualify for home oxygen and you can stop. If patient does not drop below 89%, then patient should have an overnight oximetry trending on room air to see if level falls below 88%. Complete level in Step 3 below. 3. Room air overnight oximetry level 88 % for___  cumulative minutes. If patients room air oxygen level is < 89% for at least 5 cumulative minutes, patient will qualify for home oxygen and you can stop. (Attach Night Trending Report)    Complete order below: Diagnosis:__________  Home oxygen at:  Length of Need: ? Lifetime ?  3 Months     ___lpm or __%   via  [] nasal cannula  []mask  [] other:___         []continuous []  with activity  []  Nocturnal   [] Portable Tanks []  Concentrator        Therapist Signature:____________     Date:  _____  Physician Signature:  __Electronically Signed in EMR_    Date: ____    Physician Printed Name:  _________   NPI # _____[id number]    [] Patient Qualifies

## 2019-06-17 LAB
LV EF: 60 %
LVEF MODALITY: NORMAL
PLATELET # BLD: 243 K/CU MM (ref 140–440)

## 2019-06-17 PROCEDURE — 94762 N-INVAS EAR/PLS OXIMTRY CONT: CPT

## 2019-06-17 PROCEDURE — 6370000000 HC RX 637 (ALT 250 FOR IP): Performed by: FAMILY MEDICINE

## 2019-06-17 PROCEDURE — 2060000000 HC ICU INTERMEDIATE R&B

## 2019-06-17 PROCEDURE — 2580000003 HC RX 258: Performed by: EMERGENCY MEDICINE

## 2019-06-17 PROCEDURE — 94761 N-INVAS EAR/PLS OXIMETRY MLT: CPT

## 2019-06-17 PROCEDURE — 94664 DEMO&/EVAL PT USE INHALER: CPT

## 2019-06-17 PROCEDURE — 6370000000 HC RX 637 (ALT 250 FOR IP): Performed by: HOSPITALIST

## 2019-06-17 PROCEDURE — 85049 AUTOMATED PLATELET COUNT: CPT

## 2019-06-17 PROCEDURE — 2580000003 HC RX 258: Performed by: FAMILY MEDICINE

## 2019-06-17 PROCEDURE — 6370000000 HC RX 637 (ALT 250 FOR IP): Performed by: NURSE PRACTITIONER

## 2019-06-17 PROCEDURE — 94640 AIRWAY INHALATION TREATMENT: CPT

## 2019-06-17 PROCEDURE — 93306 TTE W/DOPPLER COMPLETE: CPT

## 2019-06-17 PROCEDURE — 2700000000 HC OXYGEN THERAPY PER DAY

## 2019-06-17 RX ADMIN — METOPROLOL TARTRATE 50 MG: 50 TABLET ORAL at 10:00

## 2019-06-17 RX ADMIN — FUROSEMIDE 20 MG: 20 TABLET ORAL at 20:39

## 2019-06-17 RX ADMIN — OXYCODONE HYDROCHLORIDE AND ACETAMINOPHEN 1 TABLET: 5; 325 TABLET ORAL at 14:29

## 2019-06-17 RX ADMIN — SODIUM CHLORIDE, PRESERVATIVE FREE 10 ML: 5 INJECTION INTRAVENOUS at 20:44

## 2019-06-17 RX ADMIN — FUROSEMIDE 20 MG: 20 TABLET ORAL at 09:59

## 2019-06-17 RX ADMIN — OXYCODONE HYDROCHLORIDE AND ACETAMINOPHEN 1 TABLET: 5; 325 TABLET ORAL at 20:39

## 2019-06-17 RX ADMIN — IPRATROPIUM BROMIDE AND ALBUTEROL SULFATE 1 AMPULE: .5; 3 SOLUTION RESPIRATORY (INHALATION) at 11:51

## 2019-06-17 RX ADMIN — OXYCODONE HYDROCHLORIDE AND ACETAMINOPHEN 1 TABLET: 5; 325 TABLET ORAL at 04:18

## 2019-06-17 RX ADMIN — SODIUM CHLORIDE, PRESERVATIVE FREE 10 ML: 5 INJECTION INTRAVENOUS at 09:59

## 2019-06-17 RX ADMIN — Medication 10 ML: at 20:44

## 2019-06-17 RX ADMIN — METOPROLOL TARTRATE 50 MG: 50 TABLET ORAL at 20:39

## 2019-06-17 RX ADMIN — CALCIUM CARBONATE 500 MG: 500 TABLET, CHEWABLE ORAL at 20:39

## 2019-06-17 RX ADMIN — ASPIRIN 81 MG 81 MG: 81 TABLET ORAL at 10:00

## 2019-06-17 RX ADMIN — IPRATROPIUM BROMIDE AND ALBUTEROL SULFATE 1 AMPULE: .5; 3 SOLUTION RESPIRATORY (INHALATION) at 19:51

## 2019-06-17 RX ADMIN — APIXABAN 5 MG: 5 TABLET, FILM COATED ORAL at 09:59

## 2019-06-17 RX ADMIN — IPRATROPIUM BROMIDE AND ALBUTEROL SULFATE 1 AMPULE: .5; 3 SOLUTION RESPIRATORY (INHALATION) at 15:38

## 2019-06-17 RX ADMIN — Medication 10 ML: at 10:00

## 2019-06-17 RX ADMIN — APIXABAN 5 MG: 5 TABLET, FILM COATED ORAL at 20:39

## 2019-06-17 ASSESSMENT — PAIN SCALES - GENERAL
PAINLEVEL_OUTOF10: 6
PAINLEVEL_OUTOF10: 8
PAINLEVEL_OUTOF10: 0
PAINLEVEL_OUTOF10: 2
PAINLEVEL_OUTOF10: 8
PAINLEVEL_OUTOF10: 7
PAINLEVEL_OUTOF10: 8

## 2019-06-17 ASSESSMENT — PAIN DESCRIPTION - LOCATION
LOCATION: BACK
LOCATION: BACK

## 2019-06-17 NOTE — CARE COORDINATION
Attempted to meet with patient. She is on the phone and deferred tis conversation \"until later\". CM will revisit as time allows. Randall Michael RN    Chart reviewed. Patient lives in rent controlled [facility] rise ([place]). Patient has a PCP at [facility]. Patient has insurance that assists with Rx when needed. CM will remian available should needs arise.  Randall Michael RN

## 2019-06-17 NOTE — PROGRESS NOTES
pulmonary      SUBJECTIVE: seen gwen am,sleeping and no sob     OBJECTIVE    VITALS:  BP (!) 134/52   Pulse 82   Temp 98.4 °F (36.9 °C) (Oral)   Resp 22   Ht 5' 11\" (1.803 m)   Wt 235 lb 0.2 oz (106.6 kg)   SpO2 92%   BMI 32.78 kg/m²   HEAD AND FACE EXAM:  No throat injection, no active exudate,no thrush  NECK EXAM;No JVD, no masses, symmetrical  CHEST EXAM; Expansion equal and symmetrical, no masses  LUNG EXAM; Good breath sounds bilaterally. There are expiratory wheezes both lungs, there are crackles at both lung bases  CARDIOVASCULAR EXAM: Positive S1 and S2, no S3 or S4, no clicks ,no murmurs  RIGHT AND LEFT LOWER EXTRIMITY EXAM: No edema, no swelling, no inflamation            LABS   Lab Results   Component Value Date    WBC 8.1 06/16/2019    HGB 11.1 (L) 06/16/2019    HCT 36.4 (L) 06/16/2019    .5 (H) 06/16/2019     06/17/2019     Lab Results   Component Value Date    CREATININE 0.6 06/16/2019    BUN 7 06/16/2019     06/16/2019    K 3.9 06/16/2019     06/16/2019    CO2 29 06/16/2019     Lab Results   Component Value Date    INR 1.18 06/14/2019    PROTIME 13.4 (H) 06/14/2019          Lab Results   Component Value Date    PHOS 2.7 12/18/2010      No results for input(s): PH, PO2ART, UMN8DNW, HCO3, BEART, O2SAT in the last 72 hours.       Wt Readings from Last 3 Encounters:   06/17/19 235 lb 0.2 oz (106.6 kg)   07/13/16 205 lb (93 kg)   03/26/15 225 lb (102.1 kg)               ASSESMENT  Ac pe  pulm infarction        PLAN  1. cpm    6/17/2019  Dewey Rodriguez M.D.

## 2019-06-17 NOTE — PROGRESS NOTES
Hospitalist Progress Note      Name:  Nydia Montenegro /Age/Sex: 1940  (69 y.o. female)   MRN & CSN:  [id number] & [id number] Admission Date/Time: 2019 10:00 PM   Location:  -A PCP: Kenyon Garza 112 Day: 4    Assessment and Plan:   Nydia Montenegro is a 69 y.o.  female  who presents with chest pain      LLL PE with infarct  -already was on eliquis for hx of DVT but noncompliant  -hemodynamically stable, doubt right heart strain  -normal trop  -dced heparin drip  -cont eliquis  -echo pending    Chest Pain, SOB  - due to above  - serial trop neg  - improving  -cont analgesics       Hypomagnesemia  -resolved     Hypokalemia  -resolved    PAD  HTN  DVT        Diet DIET GENERAL;   DVT Prophylaxis [] eliquis   GI Prophylaxis [] PPI, [] H2 Blocker, [x] No GI prophylaxis, patient is receiving diet/Tube Feeds   Code Status Full Code             History of Present Illness:     Pt S&E. Reports SOB and chest pain with deep breathing. Ten point ROS reviewed negative, unless as noted above    Objective: Intake/Output Summary (Last 24 hours) at 2019 1436  Last data filed at 2019 1753  Gross per 24 hour   Intake 120 ml   Output --   Net 120 ml      Vitals:   Vitals:    19 1420   BP:    Pulse: 82   Resp:    Temp:    SpO2:      Physical Exam:    GEN Awake female, in mild distress due to pain. RESP Clear to auscultation, no wheezes, rales or rhonchi. Symmetric chest movement while on room air. CARDIO/VASC S1/S2 auscultated. Regular rate without appreciable murmurs, rubs, or gallops. No peripheral edema. GI Abdomen is soft without significant tenderness, masses, or guarding. Bowel sounds are normoactive. MSK No gross joint deformities. Spontaneous movement of all extremities  SKIN Normal coloration, warm, dry. NEURO Cranial nerves appear grossly intact, normal speech, no lateralizing weakness. PSYCH Awake, alert, Affect appropriate.     Medications: Medications:    ipratropium-albuterol  1 ampule Inhalation Q4H WA    metoprolol tartrate  50 mg Oral BID    furosemide  20 mg Oral BID    aspirin  81 mg Oral Daily    sodium chloride flush  10 mL Intravenous 2 times per day    apixaban  5 mg Oral BID    sodium chloride flush  10 mL Intravenous BID      Infusions:   PRN Meds:     sodium chloride flush 10 mL PRN   magnesium hydroxide 30 mL Daily PRN   ondansetron 4 mg Q6H PRN   morphine 2 mg Q3H PRN   oxyCODONE-acetaminophen 1 tablet Q6H PRN   melatonin ER 1 mg Nightly PRN   calcium carbonate 500 mg TID PRN   potassium chloride 10 mEq PRN         Electronically signed by Dee Dee Nolan MD on 6/17/2019 at 2:36 PM

## 2019-06-18 VITALS
WEIGHT: 235.01 LBS | OXYGEN SATURATION: 94 % | TEMPERATURE: 98.6 F | SYSTOLIC BLOOD PRESSURE: 175 MMHG | HEART RATE: 81 BPM | HEIGHT: 71 IN | RESPIRATION RATE: 19 BRPM | DIASTOLIC BLOOD PRESSURE: 94 MMHG | BODY MASS INDEX: 32.9 KG/M2

## 2019-06-18 PROCEDURE — 2580000003 HC RX 258: Performed by: FAMILY MEDICINE

## 2019-06-18 PROCEDURE — 94761 N-INVAS EAR/PLS OXIMETRY MLT: CPT

## 2019-06-18 PROCEDURE — 6370000000 HC RX 637 (ALT 250 FOR IP): Performed by: FAMILY MEDICINE

## 2019-06-18 PROCEDURE — 6370000000 HC RX 637 (ALT 250 FOR IP): Performed by: HOSPITALIST

## 2019-06-18 RX ADMIN — APIXABAN 5 MG: 5 TABLET, FILM COATED ORAL at 09:03

## 2019-06-18 RX ADMIN — FUROSEMIDE 20 MG: 20 TABLET ORAL at 09:03

## 2019-06-18 RX ADMIN — SODIUM CHLORIDE, PRESERVATIVE FREE 10 ML: 5 INJECTION INTRAVENOUS at 09:04

## 2019-06-18 RX ADMIN — OXYCODONE HYDROCHLORIDE AND ACETAMINOPHEN 1 TABLET: 5; 325 TABLET ORAL at 09:03

## 2019-06-18 RX ADMIN — OXYCODONE HYDROCHLORIDE AND ACETAMINOPHEN 1 TABLET: 5; 325 TABLET ORAL at 02:28

## 2019-06-18 RX ADMIN — METOPROLOL TARTRATE 50 MG: 50 TABLET ORAL at 09:03

## 2019-06-18 RX ADMIN — ASPIRIN 81 MG 81 MG: 81 TABLET ORAL at 09:03

## 2019-06-18 ASSESSMENT — PAIN DESCRIPTION - PAIN TYPE: TYPE: CHRONIC PAIN

## 2019-06-18 ASSESSMENT — PAIN - FUNCTIONAL ASSESSMENT: PAIN_FUNCTIONAL_ASSESSMENT: ACTIVITIES ARE NOT PREVENTED

## 2019-06-18 ASSESSMENT — PAIN SCALES - GENERAL
PAINLEVEL_OUTOF10: 2
PAINLEVEL_OUTOF10: 9
PAINLEVEL_OUTOF10: 0
PAINLEVEL_OUTOF10: 6

## 2019-06-18 ASSESSMENT — PAIN DESCRIPTION - DESCRIPTORS: DESCRIPTORS: ACHING

## 2019-06-18 ASSESSMENT — PAIN DESCRIPTION - ORIENTATION: ORIENTATION: RIGHT;LEFT

## 2019-06-18 ASSESSMENT — PAIN DESCRIPTION - LOCATION: LOCATION: LEG

## 2019-06-18 ASSESSMENT — PAIN DESCRIPTION - PROGRESSION
CLINICAL_PROGRESSION: GRADUALLY WORSENING

## 2019-06-18 ASSESSMENT — PAIN DESCRIPTION - FREQUENCY: FREQUENCY: INTERMITTENT

## 2019-06-18 ASSESSMENT — PAIN DESCRIPTION - ONSET: ONSET: ON-GOING

## 2019-06-18 NOTE — DISCHARGE SUMMARY
Normal conjunctiva. ENT/Mouth: normal appearing jaw and neck, no neck nodes or sinus tenderness. Clear oropharynx with moist mucous membrane. Cardiovascular:  normal rate, regular rhythm, normal S1, S2, no murmurs, rubs, clicks or gallops. No peripheral edema. Dorsal pedis pulses 2+ bilaterally. Respiratory: Mild exp wheeze on left, without respiratory distress. Gastrointestinal: soft, nontender, nondistended, no masses or organomegaly. Genitourinary:  No CVA tenderness. Musculoskletal:  no clubbing or cyanosis. No joint swelling, warmth, or tenderness. Skin:  normal coloration and turgor, no rashes, no suspicious skin lesions noted. Condition at the time of discharge:  Stable  Disposition: home  Discharge FOLLOW UP  Follow-up With  Details  Why  Contact ISHA Owens CNP in 3 days  Follow up  @ 1 pm with Home Escobar CNP  [facility]  [phone]         Discharge Medications:       Marilou Zapien   [place] Medication Instructions TI    Printed on:19 2673   Medication Information                      apixaban (ELIQUIS) 5 MG TABS tablet  Take 1 tablet by mouth 2 times daily             aspirin 81 MG tablet  Take 81 mg by mouth daily. furosemide (LASIX) 20 MG tablet  Take 20 mg by mouth 2 times daily. magnesium 30 MG tablet  Take 30 mg by mouth daily             metoprolol (LOPRESSOR) 50 MG tablet  Take 1 tablet by mouth 2 times daily.              potassium chloride (KLOR-CON) 20 MEQ packet  Take 40 mEq by mouth daily                 Consults:  IP CONSULT TO HOSPITALIST  IP CONSULT TO PULMONOLOGY    Significant Procedures:  none    Significant Diagnostic Studies:   Lab Results   Component Value Date    WBC 8.1 2019    HGB 11.1 (L) 2019    HCT 36.4 (L) 2019    .5 (H) 2019     2019     Lab Results   Component Value Date     2019    K 3.9 06/16/2019     06/16/2019    CO2 29 06/16/2019    BUN 7 06/16/2019    CREATININE 0.6 06/16/2019    GLUCOSE 93 06/16/2019    CALCIUM 8.0 (L) 06/16/2019    PROT 6.8 06/14/2019    LABALBU 3.4 06/14/2019    BILITOT 2.0 (H) 06/14/2019    ALKPHOS 93 06/14/2019    AST 20 06/14/2019    ALT 10 06/14/2019    LABGLOM >60 06/16/2019    GFRAA >60 06/16/2019       Xr Chest Portable    Result Date: 6/15/2019  EXAMINATION: ONE XRAY VIEW OF THE CHEST 6/14/2019 10:21 pm COMPARISON: CTA chest 07/13/2016. HISTORY: ORDERING SYSTEM PROVIDED HISTORY: short of breath TECHNOLOGIST PROVIDED HISTORY: Reason for exam:->short of breath Ordering Physician Provided Reason for Exam: SOB Acuity: Acute Type of Exam: Initial FINDINGS: Minimal blunting left costophrenic angle along with minimal patchy/streaky opacity to left lung base concerning for small left pleural effusion and associated atelectasis or developing infiltrate. Remainder of the lungs appear to be clear. No pulmonary edema or pneumothoraces. Cardiac and mediastinal silhouettes are within normal limits and stable. No acute bony abnormalities. Trace left pleural effusion along with minimal left basilar atelectasis or developing infiltrate. Vl Dup Lower Extremity Venous Bilateral    Result Date: 6/16/2019  EXAMINATION: DUPLEX VENOUS ULTRASOUND OF THE BILATERAL LOWER EXTREMITIES, 6/15/2019 5:39 am TECHNIQUE: Duplex ultrasound and Doppler images were obtained of thelower extremities COMPARISON: PE study dated June 14, 2019 HISTORY: ORDERING SYSTEM PROVIDED HISTORY: check for DVT TECHNOLOGIST PROVIDED HISTORY: Reason for exam:->check for DVT Ordering Physician Provided Reason for Exam: known PE Acuity: Acute Type of Exam: Initial Additional signs and symptoms: patient reports right leg swelling X 1 week FINDINGS: The visualized veins of the left lower extremity are patent and free of echogenic thrombus. The veins are normally compressible and have normal phasic flow.

## 2019-06-18 NOTE — PROGRESS NOTES
Discharge instructions given to patient, all questions answered, verbalized understanding. PIV removed, assisted dressing patient, eating lunch, waiting for niece to arrive, bed alarm on.

## 2019-06-18 NOTE — DISCHARGE INSTR - DIET

## 2019-06-18 NOTE — PROGRESS NOTES
pulmonary      SUBJECTIVE: seen early am,no sob     OBJECTIVE    VITALS:  BP (!) 175/83   Pulse 97   Temp 99.5 °F (37.5 °C) (Oral)   Resp 16   Ht 5' 11\" (1.803 m)   Wt 235 lb 0.2 oz (106.6 kg)   SpO2 95%   BMI 32.78 kg/m²   HEAD AND FACE EXAM:  No throat injection, no active exudate,no thrush  NECK EXAM;No JVD, no masses, symmetrical  CHEST EXAM; Expansion equal and symmetrical, no masses  LUNG EXAM; Good breath sounds bilaterally. There are expiratory wheezes both lungs, there are crackles at both lung bases  CARDIOVASCULAR EXAM: Positive S1 and S2, no S3 or S4, no clicks ,no murmurs  RIGHT AND LEFT LOWER EXTRIMITY EXAM: No edema, no swelling, no inflamation            LABS   Lab Results   Component Value Date    WBC 8.1 06/16/2019    HGB 11.1 (L) 06/16/2019    HCT 36.4 (L) 06/16/2019    .5 (H) 06/16/2019     06/17/2019     Lab Results   Component Value Date    CREATININE 0.6 06/16/2019    BUN 7 06/16/2019     06/16/2019    K 3.9 06/16/2019     06/16/2019    CO2 29 06/16/2019     Lab Results   Component Value Date    INR 1.18 06/14/2019    PROTIME 13.4 (H) 06/14/2019          Lab Results   Component Value Date    PHOS 2.7 12/18/2010      No results for input(s): PH, PO2ART, LDH0XEB, HCO3, BEART, O2SAT in the last 72 hours. Wt Readings from Last 3 Encounters:   06/17/19 235 lb 0.2 oz (106.6 kg)   07/13/16 205 lb (93 kg)   03/26/15 225 lb (102.1 kg)               ASSESMENT  Ac pe  pulm infarction        PLAN  1. cpm  2.  Hopefully dc soon    6/18/2019  Lindsay Medina M.D.

## 2019-06-18 NOTE — PROGRESS NOTES
Received call from [facility] stating patient had ripped off finger probe from night trending. Says \" I'm not wearing that for 7 hrs. \"  Pt argues anything we explain to her.

## 2019-06-19 LAB
EKG ATRIAL RATE: 111 BPM
EKG DIAGNOSIS: NORMAL
EKG P AXIS: 69 DEGREES
EKG P-R INTERVAL: 160 MS
EKG Q-T INTERVAL: 472 MS
EKG QRS DURATION: 80 MS
EKG QTC CALCULATION (BAZETT): 641 MS
EKG R AXIS: 45 DEGREES
EKG T AXIS: 65 DEGREES
EKG VENTRICULAR RATE: 111 BPM

## 2019-08-19 ENCOUNTER — INITIAL CONSULT (OUTPATIENT)
Dept: CARDIOLOGY CLINIC | Age: 79
End: 2019-08-19
Payer: COMMERCIAL

## 2019-08-19 VITALS
WEIGHT: 197.4 LBS | BODY MASS INDEX: 27.64 KG/M2 | SYSTOLIC BLOOD PRESSURE: 126 MMHG | HEIGHT: 71 IN | DIASTOLIC BLOOD PRESSURE: 78 MMHG | HEART RATE: 72 BPM

## 2019-08-19 DIAGNOSIS — I10 HYPERTENSION, UNSPECIFIED TYPE: Primary | ICD-10-CM

## 2019-08-19 DIAGNOSIS — I82.401 ACUTE DEEP VEIN THROMBOSIS (DVT) OF RIGHT LOWER EXTREMITY, UNSPECIFIED VEIN (HCC): ICD-10-CM

## 2019-08-19 PROCEDURE — 93000 ELECTROCARDIOGRAM COMPLETE: CPT | Performed by: INTERNAL MEDICINE

## 2019-08-19 PROCEDURE — 99213 OFFICE O/P EST LOW 20 MIN: CPT | Performed by: INTERNAL MEDICINE

## 2019-08-19 RX ORDER — ALBUTEROL SULFATE 90 UG/1
2 AEROSOL, METERED RESPIRATORY (INHALATION) EVERY 6 HOURS PRN
COMMUNITY

## 2019-08-19 RX ORDER — HYDROCODONE BITARTRATE AND ACETAMINOPHEN 5; 325 MG/1; MG/1
1 TABLET ORAL EVERY 6 HOURS PRN
COMMUNITY
End: 2021-12-21

## 2019-11-01 ENCOUNTER — OFFICE VISIT (OUTPATIENT)
Dept: CARDIOLOGY CLINIC | Age: 79
End: 2019-11-01
Payer: COMMERCIAL

## 2019-11-01 VITALS
RESPIRATION RATE: 16 BRPM | HEART RATE: 76 BPM | SYSTOLIC BLOOD PRESSURE: 108 MMHG | HEIGHT: 71 IN | BODY MASS INDEX: 27.72 KG/M2 | DIASTOLIC BLOOD PRESSURE: 70 MMHG | WEIGHT: 198 LBS

## 2019-11-01 DIAGNOSIS — I35.0 NONRHEUMATIC AORTIC VALVE STENOSIS: Primary | ICD-10-CM

## 2019-11-01 DIAGNOSIS — I82.401 ACUTE DEEP VEIN THROMBOSIS (DVT) OF RIGHT LOWER EXTREMITY, UNSPECIFIED VEIN (HCC): ICD-10-CM

## 2019-11-01 PROCEDURE — G8400 PT W/DXA NO RESULTS DOC: HCPCS | Performed by: INTERNAL MEDICINE

## 2019-11-01 PROCEDURE — G8484 FLU IMMUNIZE NO ADMIN: HCPCS | Performed by: INTERNAL MEDICINE

## 2019-11-01 PROCEDURE — 4040F PNEUMOC VAC/ADMIN/RCVD: CPT | Performed by: INTERNAL MEDICINE

## 2019-11-01 PROCEDURE — 99214 OFFICE O/P EST MOD 30 MIN: CPT | Performed by: INTERNAL MEDICINE

## 2019-11-01 PROCEDURE — G8427 DOCREV CUR MEDS BY ELIG CLIN: HCPCS | Performed by: INTERNAL MEDICINE

## 2019-11-01 PROCEDURE — 1090F PRES/ABSN URINE INCON ASSESS: CPT | Performed by: INTERNAL MEDICINE

## 2019-11-01 PROCEDURE — 1123F ACP DISCUSS/DSCN MKR DOCD: CPT | Performed by: INTERNAL MEDICINE

## 2019-11-01 PROCEDURE — 1036F TOBACCO NON-USER: CPT | Performed by: INTERNAL MEDICINE

## 2019-11-01 PROCEDURE — G8419 CALC BMI OUT NRM PARAM NOF/U: HCPCS | Performed by: INTERNAL MEDICINE

## 2019-11-19 ENCOUNTER — PROCEDURE VISIT (OUTPATIENT)
Dept: CARDIOLOGY CLINIC | Age: 79
End: 2019-11-19
Payer: COMMERCIAL

## 2019-11-19 PROCEDURE — 93308 TTE F-UP OR LMTD: CPT | Performed by: INTERNAL MEDICINE

## 2019-11-25 ENCOUNTER — TELEPHONE (OUTPATIENT)
Dept: CARDIOLOGY CLINIC | Age: 79
End: 2019-11-25

## 2020-04-27 ENCOUNTER — VIRTUAL VISIT (OUTPATIENT)
Dept: CARDIOLOGY CLINIC | Age: 80
End: 2020-04-27
Payer: COMMERCIAL

## 2020-04-27 VITALS — WEIGHT: 191 LBS | BODY MASS INDEX: 26.74 KG/M2 | HEIGHT: 71 IN

## 2020-04-27 PROCEDURE — 99442 PR PHYS/QHP TELEPHONE EVALUATION 11-20 MIN: CPT | Performed by: INTERNAL MEDICINE

## 2020-04-27 NOTE — LETTER
Terry Schroeder  1940  [id number]    Have you had any Chest Pain - Yes under her left breast   If Yes DO EKG - How does it feel - Sharp Pain   How long does the pain last - seconds   How long have you been having the pain - Day's     Have you had any Shortness of Breath - No      Have you had any dizziness - No      Have you had any palpitations - No    Do you have any edema -  No    Do you have a surgery or procedure scheduled in the near future - No

## 2020-04-27 NOTE — PROGRESS NOTES
mouth daily      magnesium 30 MG tablet Take 30 mg by mouth daily      metoprolol (LOPRESSOR) 50 MG tablet Take 1 tablet by mouth 2 times daily. 60 tablet 3    furosemide (LASIX) 20 MG tablet Take 20 mg by mouth 2 times daily.  aspirin 81 MG tablet Take 81 mg by mouth daily. No current facility-administered medications for this visit. Allergies: Coumadin [warfarin sodium]; Ambien [zolpidem tartrate]; and Morphine sulfate  Past Medical History:   Diagnosis Date    Arthritis     DVT of deep femoral vein ([facility] [address] 2016    H/O echocardiogram Limited 2019    MOD AS, JOLENE 1.3 sq    History of echocardiogram 2019    EF >60% mild to moderate TR , MOD AS JOLENE 1.17 sq, Mod PHTN, Grade II DD.     Hypertension     PAD (peripheral artery disease) (HCC)     Pneumonia     Seizures (HCC)     Vertigo      Past Surgical History:   Procedure Laterality Date     SECTION      COLONOSCOPY      ENDOSCOPY, COLON, DIAGNOSTIC       Family History   Problem Relation Age of Onset    Cancer Mother         BREAST BILAT     Social History     Tobacco Use    Smoking status: Former Smoker     Packs/day: 0.25     Years: 35.00     Pack years: 8.75    Smokeless tobacco: Never Used   Substance Use Topics    Alcohol use: No     Comment: social      [unfilled]  Review of Systems:   · Constitutional: No Fever or Weight Loss   · Eyes: No Decreased Vision  · ENT: No Headaches, Hearing Loss or Vertigo  · Cardiovascular: No chest pain, dyspnea on exertion, palpitations or loss of consciousness  · Respiratory: No cough or wheezing    · Gastrointestinal: No abdominal pain, appetite loss, blood in stools, constipation, diarrhea or heartburn  · Genitourinary: No dysuria, trouble voiding, or hematuria  · Musculoskeletal:  No gait disturbance, weakness or joint complaints  · Integumentary: No rash or pruritis  · Neurological: No TIA or stroke symptoms  · Psychiatric: No anxiety or depression  · Endocrine: No malaise, fatigue or temperature intolerance  · Hematologic/Lymphatic: No bleeding problems, blood clots or swollen lymph nodes  · Allergic/Immunologic: No nasal congestion or hives  All systems negative except as marked. Objective:  Ht 5' 11\" (1.803 m)   Wt 191 lb (86.6 kg)   BMI 26.64 kg/m²   Wt Readings from Last 3 Encounters:   04/27/20 191 lb (86.6 kg)   11/01/19 198 lb (89.8 kg)   08/19/19 197 lb 6.4 oz (89.5 kg)     Body mass index is 26.64 kg/m². PHYSICAL EXAMINATION:  Other pertinent observable physical exam findings-     Lab Results   Component Value Date    CKTOTAL 58 01/19/2014    CKMB 2.1 01/19/2014    TROPONINI 0.007 01/19/2014    TROPONINI <0.006 04/03/2012     BNP:    Lab Results   Component Value Date    BNP 45 01/18/2014     PT/INR:  No results found for: PTINR  Lab Results   Component Value Date    LABA1C 5.8 03/09/2012     Lab Results   Component Value Date    CHOL 231 (H) 03/09/2012    TRIG 96 03/09/2012    HDL 67 03/09/2012    LDLDIRECT 166 (H) 03/09/2012     Lab Results   Component Value Date    ALT 10 06/14/2019    AST 20 06/14/2019     TSH:  No results found for: TSH    Impression:  Negro is a 67 y. o.year old who  has a past medical history of Arthritis, DVT of deep femoral vein ([address] H/O echocardiogram Limited, History of echocardiogram, Hypertension, PAD (peripheral artery disease) ([address] Pneumonia, Seizures ([address] and Vertigo. and presents with     Plan:  1. Chest pain: patient has left lung PE , and rt leg DVT, echo showed moderate AS, continue eliquis\  2. Recurrent thrombosis; she had left leg DVT in past and now rt leg with PE, will continue anticoagulation life long, will send for IVC filter and then will arrange for EKOS of the rt leg as needed. 3. Rt leg DVT and pain: recheck doppler and will decide for venoplasty  4. Moderate AORTIC STENOSIs, echo repeated, JOLENE is 1.3 discussed with her about TAVR AND SAVR  5. HTN: stable  6. H/o seizures  7.  Deafness: left heat has tympanic perforation  1. Health maintenance: exerise and diet  All labs, medications and tests reviewed, continue all other medications of all above medical condition listed as is. Otto Will is a 67 y.o. female being evaluated by a Virtual Visit (video visit) encounter to address concerns as mentioned above. A caregiver was present when appropriate. Due to this being a TeleHealth encounter (During ZBNVV-35 public health emergency),   Pursuant to the emergency declaration under the [facility], [facility] waiver authority and the [employer] Act, this Virtual Visit was conducted with patient's (and/or legal guardian's) consent, to reduce the patient's risk of exposure to COVID-19 and provide necessary medical care. The patient (and/or legal guardian) has also been advised to contact this office for worsening conditions or problems, and seek emergency medical treatment and/or call 911 if deemed necessary. Services were provided through a audio synchronous discussion virtually to substitute for in-person clinic visit. Patient and provider were located at their individual homes. 1.   I confirm that this visit was completed in a telehealth setting ,using synchronous audiovisual technology for real time patient interaction . The patient identity with name and date of birth was confirmed . This evaluation of patient was done by telehealth in the setting of GQV-73 Public health emergency , which precluded assurance of safe in person visit at the time of service. The patient consented to and accepts potential risks associated with telemedical evaluation and care was taken to assess perez presence of any medical issues that would be more  appropriate for expedited in -person care. Pursuant to the emergency declaration under the [facility], [address] waiver authority and the Coronavirus Preparedness and Response Supplemental Appropriations Act, this Virtual  Visit was conducted, with patient's consent, to reduce the patient's risk of exposure to COVID-19 and provide continuity of care for an established patient. Services were provided through a video synchronous discussion virtually to substitute for in-person clinic visit. 2. I Affirm this is a Patient Initiated Episode with an Established Patient who has not had a related appointment within my department in the past 7 days or scheduled within the next 24 hours. --Nilam Valerio MD on 4/27/2020 at 2:45 PM    An electronic signature was used to authenticate this note.

## 2021-02-09 ENCOUNTER — APPOINTMENT (OUTPATIENT)
Dept: GENERAL RADIOLOGY | Age: 81
End: 2021-02-09
Payer: COMMERCIAL

## 2021-02-09 ENCOUNTER — HOSPITAL ENCOUNTER (EMERGENCY)
Age: 81
Discharge: HOME | End: 2021-02-10
Attending: EMERGENCY MEDICINE
Payer: COMMERCIAL

## 2021-02-09 DIAGNOSIS — R07.9 ACUTE CHEST PAIN: Primary | ICD-10-CM

## 2021-02-09 LAB
ALBUMIN SERPL-MCNC: 3.7 GM/DL (ref 3.4–5)
ALP BLD-CCNC: 73 IU/L (ref 40–129)
ALT SERPL-CCNC: 8 U/L (ref 10–40)
ANION GAP SERPL CALCULATED.3IONS-SCNC: 20 MMOL/L (ref 4–16)
AST SERPL-CCNC: 22 IU/L (ref 15–37)
BASOPHILS ABSOLUTE: 0.1 K/CU MM
BASOPHILS RELATIVE PERCENT: 1.9 % (ref 0–1)
BILIRUB SERPL-MCNC: 0.5 MG/DL (ref 0–1)
BUN BLDV-MCNC: 13 MG/DL (ref 6–23)
CALCIUM SERPL-MCNC: 9 MG/DL (ref 8.3–10.6)
CHLORIDE BLD-SCNC: 98 MMOL/L (ref 99–110)
CO2: 23 MMOL/L (ref 21–32)
CREAT SERPL-MCNC: 0.6 MG/DL (ref 0.6–1.1)
DIFFERENTIAL TYPE: ABNORMAL
EOSINOPHILS ABSOLUTE: 0.3 K/CU MM
EOSINOPHILS RELATIVE PERCENT: 6.9 % (ref 0–3)
GFR AFRICAN AMERICAN: >60 ML/MIN/1.73M2
GFR NON-AFRICAN AMERICAN: >60 ML/MIN/1.73M2
GLUCOSE BLD-MCNC: 71 MG/DL (ref 70–99)
HCT VFR BLD CALC: 41.2 % (ref 37–47)
HEMOGLOBIN: 13 GM/DL (ref 12.5–16)
IMMATURE NEUTROPHIL %: 0.3 % (ref 0–0.43)
LYMPHOCYTES ABSOLUTE: 1 K/CU MM
LYMPHOCYTES RELATIVE PERCENT: 28 % (ref 24–44)
MCH RBC QN AUTO: 31.5 PG (ref 27–31)
MCHC RBC AUTO-ENTMCNC: 31.6 % (ref 32–36)
MCV RBC AUTO: 99.8 FL (ref 78–100)
MONOCYTES ABSOLUTE: 0.3 K/CU MM
MONOCYTES RELATIVE PERCENT: 8 % (ref 0–4)
NUCLEATED RBC %: 0 %
PDW BLD-RTO: 15.7 % (ref 11.7–14.9)
PLATELET # BLD: 178 K/CU MM (ref 140–440)
PMV BLD AUTO: 9.5 FL (ref 7.5–11.1)
POTASSIUM SERPL-SCNC: 3.8 MMOL/L (ref 3.5–5.1)
PRO-BNP: 64.9 PG/ML
RBC # BLD: 4.13 M/CU MM (ref 4.2–5.4)
SEGMENTED NEUTROPHILS ABSOLUTE COUNT: 2 K/CU MM
SEGMENTED NEUTROPHILS RELATIVE PERCENT: 54.9 % (ref 36–66)
SODIUM BLD-SCNC: 141 MMOL/L (ref 135–145)
TOTAL IMMATURE NEUTOROPHIL: 0.01 K/CU MM
TOTAL NUCLEATED RBC: 0 K/CU MM
TOTAL PROTEIN: 7 GM/DL (ref 6.4–8.2)
TROPONIN T: <0.01 NG/ML
WBC # BLD: 3.6 K/CU MM (ref 4–10.5)

## 2021-02-09 PROCEDURE — 6370000000 HC RX 637 (ALT 250 FOR IP): Performed by: EMERGENCY MEDICINE

## 2021-02-09 PROCEDURE — 71045 X-RAY EXAM CHEST 1 VIEW: CPT

## 2021-02-09 PROCEDURE — 84484 ASSAY OF TROPONIN QUANT: CPT

## 2021-02-09 PROCEDURE — 99283 EMERGENCY DEPT VISIT LOW MDM: CPT

## 2021-02-09 PROCEDURE — 36415 COLL VENOUS BLD VENIPUNCTURE: CPT

## 2021-02-09 PROCEDURE — 80053 COMPREHEN METABOLIC PANEL: CPT

## 2021-02-09 PROCEDURE — 96374 THER/PROPH/DIAG INJ IV PUSH: CPT

## 2021-02-09 PROCEDURE — 85025 COMPLETE CBC W/AUTO DIFF WBC: CPT

## 2021-02-09 PROCEDURE — 93005 ELECTROCARDIOGRAM TRACING: CPT | Performed by: EMERGENCY MEDICINE

## 2021-02-09 PROCEDURE — 83880 ASSAY OF NATRIURETIC PEPTIDE: CPT

## 2021-02-09 RX ORDER — HYDROCODONE BITARTRATE AND ACETAMINOPHEN 5; 325 MG/1; MG/1
1 TABLET ORAL ONCE
Status: COMPLETED | OUTPATIENT
Start: 2021-02-09 | End: 2021-02-09

## 2021-02-09 RX ADMIN — HYDROCODONE BITARTRATE AND ACETAMINOPHEN 1 TABLET: 5; 325 TABLET ORAL at 23:13

## 2021-02-09 ASSESSMENT — PAIN SCALES - GENERAL: PAINLEVEL_OUTOF10: 10

## 2021-02-09 ASSESSMENT — PAIN DESCRIPTION - PAIN TYPE: TYPE: ACUTE PAIN

## 2021-02-09 ASSESSMENT — PAIN DESCRIPTION - ORIENTATION: ORIENTATION: LEFT

## 2021-02-09 ASSESSMENT — PAIN DESCRIPTION - LOCATION: LOCATION: CHEST

## 2021-02-10 ENCOUNTER — APPOINTMENT (OUTPATIENT)
Dept: CT IMAGING | Age: 81
End: 2021-02-10
Payer: COMMERCIAL

## 2021-02-10 VITALS
SYSTOLIC BLOOD PRESSURE: 135 MMHG | WEIGHT: 187 LBS | HEIGHT: 71 IN | OXYGEN SATURATION: 98 % | DIASTOLIC BLOOD PRESSURE: 82 MMHG | BODY MASS INDEX: 26.18 KG/M2 | RESPIRATION RATE: 17 BRPM | HEART RATE: 68 BPM | TEMPERATURE: 98.2 F

## 2021-02-10 PROCEDURE — 6360000002 HC RX W HCPCS: Performed by: EMERGENCY MEDICINE

## 2021-02-10 PROCEDURE — 6370000000 HC RX 637 (ALT 250 FOR IP): Performed by: EMERGENCY MEDICINE

## 2021-02-10 PROCEDURE — 6360000004 HC RX CONTRAST MEDICATION: Performed by: EMERGENCY MEDICINE

## 2021-02-10 RX ORDER — MORPHINE SULFATE 4 MG/ML
4 INJECTION, SOLUTION INTRAMUSCULAR; INTRAVENOUS ONCE
Status: COMPLETED | OUTPATIENT
Start: 2021-02-10 | End: 2021-02-10

## 2021-02-10 RX ORDER — SODIUM CHLORIDE 0.9 % (FLUSH) 0.9 %
10 SYRINGE (ML) INJECTION 2 TIMES DAILY
Status: DISCONTINUED | OUTPATIENT
Start: 2021-02-10 | End: 2021-02-10 | Stop reason: HOSPADM

## 2021-02-10 RX ORDER — HYDROCODONE BITARTRATE AND ACETAMINOPHEN 5; 325 MG/1; MG/1
1 TABLET ORAL ONCE
Status: COMPLETED | OUTPATIENT
Start: 2021-02-10 | End: 2021-02-10

## 2021-02-10 RX ADMIN — HYDROCODONE BITARTRATE AND ACETAMINOPHEN 1 TABLET: 5; 325 TABLET ORAL at 02:00

## 2021-02-10 RX ADMIN — MORPHINE SULFATE 4 MG: 4 INJECTION, SOLUTION INTRAMUSCULAR; INTRAVENOUS at 00:21

## 2021-02-10 RX ADMIN — IOPAMIDOL 100 ML: 755 INJECTION, SOLUTION INTRAVENOUS at 01:11

## 2021-02-10 ASSESSMENT — PAIN SCALES - GENERAL
PAINLEVEL_OUTOF10: 10
PAINLEVEL_OUTOF10: 10

## 2021-02-10 NOTE — ED PROVIDER NOTES
Triage Chief Complaint:   Chest Pain (ONSET YESTERDAY)    APRIL Smith is a 79 y.o. female that presents with chest pain. The patient states that she has been having left-sided chest pain that is sharp in nature, worse with deep inspiration. She has some shortness of breath. States that she has a history of left-sided pulmonary embolism but has been compliant with her apixaban. Denies any fever, cough, abdominal pain, nausea, vomiting, diarrhea, palpitations, dizziness. States the pain has been worse over the last 2 days but has been present intermittently for months. Denies lower extremity swelling or pain. ROS:  At least 14 systems reviewed and otherwise acutely negative except as in the [address] Past Medical History:   Diagnosis Date    Arthritis     DVT of deep femoral vein ([facility] [address] 2016    H/O echocardiogram Limited 2019    MOD AS, JOLENE 1.3 sq    History of echocardiogram 2019    EF >60% mild to moderate TR , MOD AS JOLENE 1.17 sq, Mod PHTN, Grade II DD.  Hypertension     PAD (peripheral artery disease) (HCC)     Pneumonia     Seizures (HCC)     Vertigo      Past Surgical History:   Procedure Laterality Date     SECTION      COLONOSCOPY      ENDOSCOPY, COLON, DIAGNOSTIC       Family History   Problem Relation Age of Onset    Cancer Mother         BREAST BILAT     Social History     Socioeconomic History    Marital status:       Spouse name: Not on file    Number of children: Not on file    Years of education: Not on file    Highest education level: Not on file   Occupational History    Not on file   Social Needs    Financial resource strain: Not on file    Food insecurity     Worry: Not on file     Inability: Not on file    Transportation needs     Medical: Not on file     Non-medical: Not on file   Tobacco Use    Smoking status: Former Smoker     Packs/day: 0.25     Years: 35.00     Pack years: 8.75    Smokeless tobacco: Never Used   Substance [Zolpidem Tartrate] Other (See Comments)     hallucinations    Morphine Sulfate Other (See Comments)     agitation       Nursing Notes Reviewed    Physical Exam:  ED Triage Vitals   Enc Vitals Group      BP       Pulse       Resp       Temp       Temp src       SpO2       Weight       Height       Head Circumference       Peak Flow       Pain Score       Pain Loc       Pain Edu? Excl. in [address]? GENERAL APPEARANCE: Awake and alert. Cooperative. No acute distress. HEAD: Normocephalic. Atraumatic. EYES: EOM's grossly intact. Sclera anicteric. ENT: Mucous membranes are moist. Tolerates saliva. No trismus. NECK: Supple. No meningismus. Trachea midline. HEART: Tachycardic. Radial pulses 2+. LUNGS: Respirations unlabored. CTAB  ABDOMEN: Soft. Non-tender. No guarding or rebound. EXTREMITIES: No acute deformities. SKIN: Warm and dry. NEUROLOGICAL: No gross facial drooping. Moves all 4 extremities spontaneously. PSYCHIATRIC: Normal mood.     I have reviewed and interpreted all of the currently available lab results from this visit (if applicable):  Results for orders placed or performed during the hospital encounter of 02/09/21   CBC Auto Differential   Result Value Ref Range    WBC 3.6 (L) 4.0 - 10.5 K/CU MM    RBC 4.13 (L) 4.2 - 5.4 M/CU MM    Hemoglobin 13.0 12.5 - 16.0 GM/DL    Hematocrit 41.2 37 - 47 %    MCV 99.8 78 - 100 FL    MCH 31.5 (H) 27 - 31 PG    MCHC 31.6 (L) 32.0 - 36.0 %    RDW 15.7 (H) 11.7 - 14.9 %    Platelets 365 170 - 517 K/CU MM    MPV 9.5 7.5 - 11.1 FL    Differential Type AUTOMATED DIFFERENTIAL     Segs Relative 54.9 36 - 66 %    Lymphocytes % 28.0 24 - 44 %    Monocytes % 8.0 (H) 0 - 4 %    Eosinophils % 6.9 (H) 0 - 3 %    Basophils % 1.9 (H) 0 - 1 %    Segs Absolute 2.0 K/CU MM    Lymphocytes Absolute 1.0 K/CU MM    Monocytes Absolute 0.3 K/CU MM    Eosinophils Absolute 0.3 K/CU MM    Basophils Absolute 0.1 K/CU MM    Nucleated RBC % 0.0 %    Total Nucleated RBC 0.0 K/CU MM Total Immature Neutrophil 0.01 K/CU MM    Immature Neutrophil % 0.3 0 - 0.43 %   Comprehensive Metabolic Panel w/ Reflex to MG   Result Value Ref Range    Sodium 141 135 - 145 MMOL/L    Potassium 3.8 3.5 - 5.1 MMOL/L    Chloride 98 (L) 99 - 110 mMol/L    CO2 23 21 - 32 MMOL/L    BUN 13 6 - 23 MG/DL    CREATININE 0.6 0.6 - 1.1 MG/DL    Glucose 71 70 - 99 MG/DL    Calcium 9.0 8.3 - 10.6 MG/DL    Albumin 3.7 3.4 - 5.0 GM/DL    Total Protein 7.0 6.4 - 8.2 GM/DL    Total Bilirubin 0.5 0.0 - 1.0 MG/DL    ALT 8 (L) 10 - 40 U/L    AST 22 15 - 37 IU/L    Alkaline Phosphatase 73 40 - 129 IU/L    GFR Non-African American >60 >60 mL/min/1.73m2    GFR African American >60 >60 mL/min/1.73m2    Anion Gap 20 (H) 4 - 16   Troponin   Result Value Ref Range    Troponin T <0.010 <0.01 NG/ML   Brain Natriuretic Peptide   Result Value Ref Range    Pro-BNP 64.90 <300 PG/ML   EKG 12 Lead   Result Value Ref Range    Ventricular Rate 113 BPM    Atrial Rate 115 BPM    P-R Interval 164 ms    QRS Duration 92 ms    Q-T Interval 346 ms    QTc Calculation (Bazett) 474 ms    P Axis 68 degrees    R Axis 32 degrees    T Axis 48 degrees    Diagnosis       Sinus tachycardia  Possible Left atrial enlargement  Left ventricular hypertrophy  Abnormal ECG  When compared with ECG of 14-JUN-2019 22:14,  No significant change was found        Radiographs (if obtained):  [] The following radiograph was interpreted by myself in the absence of a radiologist:  [x] Radiologist's Report Reviewed:    EKG (if obtained): (All EKG's are interpreted by myself in the absence of a cardiologist)  12 lead EKG as interpreted by me reveals sinus tachycardia. Axis is normal. LVH. There are no ischemic ST elevations or other suspicious ST changes;  QRS interval is narrow, QT interval is not prolonged. Final interpretation: Sinus tachycardia. LVH      MDM:  Plan of care is discussed thoroughly with the patient and family if present.   If performed, all imaging and lab work also discussed with patient. All relevant prior results and chart reviewed if available. Patient presents as above. On arrival, she is tachycardic but otherwise with normal vital signs. Presents with left-sided pleuritic chest pain and has history of pulmonary embolism. Given [place] for pain control here. CTA ordered for further evaluation. EKG shows sinus tachycardia with LVH. Patient continues with pain on reevaluation and was given morphine. Metabolic panel is largely unremarkable. Her troponin is within normal limits. CTA does not show any evidence of acute abnormality. The patient has residual left lower lobe thrombus but no other acute findings. Patient's heart rate is improving on reevaluation. I do not feel that she has an acute life-threatening event that requires hospitalization at this time. This could be secondary to prior PE or potential pleurisy. Low suspicion for ACS at this time. Discharged in good condition. Clinical Impression:  1.  Acute chest pain      (Please note that portions of this note may have been completed with a voice recognition program. Efforts were made to edit the dictations but occasionally words are mis-transcribed.)    MD Durga Urbina MD  02/10/21 1934

## 2021-02-10 NOTE — ED NOTES
[facility] back from [address] [place] [address] [facility]  02/10/21 3651

## 2021-02-10 NOTE — ED TRIAGE NOTES
PATIENT TO ED WITH COMPLAINTS OF CHEST PAIN L SIDED. PATIENT REPORTS ONSET YESTERDAY. EMS GAVE 324 ASPIRIN AND 2 NITRO EN ROUTE. 20g L AC. Alert and oriented.  EMS reports EKG to be ST.

## 2021-02-12 LAB
EKG ATRIAL RATE: 115 BPM
EKG DIAGNOSIS: NORMAL
EKG P AXIS: 68 DEGREES
EKG P-R INTERVAL: 164 MS
EKG Q-T INTERVAL: 346 MS
EKG QRS DURATION: 92 MS
EKG QTC CALCULATION (BAZETT): 474 MS
EKG R AXIS: 32 DEGREES
EKG T AXIS: 48 DEGREES
EKG VENTRICULAR RATE: 113 BPM

## 2021-07-04 ENCOUNTER — HOSPITAL ENCOUNTER (INPATIENT)
Age: 81
LOS: 9 days | Discharge: HOME HEALTH | DRG: 300 | End: 2021-07-14
Attending: HOSPITALIST | Admitting: HOSPITALIST
Payer: COMMERCIAL

## 2021-07-04 ENCOUNTER — APPOINTMENT (OUTPATIENT)
Dept: ULTRASOUND IMAGING | Age: 81
DRG: 300 | End: 2021-07-04
Payer: COMMERCIAL

## 2021-07-04 DIAGNOSIS — M79.605 BILATERAL LEG PAIN: ICD-10-CM

## 2021-07-04 DIAGNOSIS — L03.116 CELLULITIS OF LEFT LOWER EXTREMITY: ICD-10-CM

## 2021-07-04 DIAGNOSIS — R60.0 BILATERAL LOWER EXTREMITY EDEMA: ICD-10-CM

## 2021-07-04 DIAGNOSIS — I82.401 ACUTE DEEP VEIN THROMBOSIS (DVT) OF RIGHT LOWER EXTREMITY, UNSPECIFIED VEIN (HCC): Primary | ICD-10-CM

## 2021-07-04 DIAGNOSIS — R06.02 SHORTNESS OF BREATH ON EXERTION: ICD-10-CM

## 2021-07-04 DIAGNOSIS — M79.604 BILATERAL LEG PAIN: ICD-10-CM

## 2021-07-04 LAB
ALBUMIN SERPL-MCNC: 3.9 GM/DL (ref 3.4–5)
ALP BLD-CCNC: 52 IU/L (ref 40–129)
ALT SERPL-CCNC: 9 U/L (ref 10–40)
ANION GAP SERPL CALCULATED.3IONS-SCNC: 11 MMOL/L (ref 4–16)
AST SERPL-CCNC: 17 IU/L (ref 15–37)
BASOPHILS ABSOLUTE: 0.1 K/CU MM
BASOPHILS RELATIVE PERCENT: 1.2 % (ref 0–1)
BILIRUB SERPL-MCNC: 0.3 MG/DL (ref 0–1)
BUN BLDV-MCNC: 10 MG/DL (ref 6–23)
CALCIUM SERPL-MCNC: 9.3 MG/DL (ref 8.3–10.6)
CHLORIDE BLD-SCNC: 100 MMOL/L (ref 99–110)
CO2: 25 MMOL/L (ref 21–32)
CREAT SERPL-MCNC: 0.7 MG/DL (ref 0.6–1.1)
DIFFERENTIAL TYPE: ABNORMAL
EOSINOPHILS ABSOLUTE: 0.2 K/CU MM
EOSINOPHILS RELATIVE PERCENT: 2.9 % (ref 0–3)
GFR AFRICAN AMERICAN: >60 ML/MIN/1.73M2
GFR NON-AFRICAN AMERICAN: >60 ML/MIN/1.73M2
GLUCOSE BLD-MCNC: 90 MG/DL (ref 70–99)
HCT VFR BLD CALC: 36.1 % (ref 37–47)
HEMOGLOBIN: 12 GM/DL (ref 12.5–16)
IMMATURE NEUTROPHIL %: 0.4 % (ref 0–0.43)
LYMPHOCYTES ABSOLUTE: 1.1 K/CU MM
LYMPHOCYTES RELATIVE PERCENT: 20.3 % (ref 24–44)
MCH RBC QN AUTO: 34.1 PG (ref 27–31)
MCHC RBC AUTO-ENTMCNC: 33.2 % (ref 32–36)
MCV RBC AUTO: 102.6 FL (ref 78–100)
MONOCYTES ABSOLUTE: 0.5 K/CU MM
MONOCYTES RELATIVE PERCENT: 9.1 % (ref 0–4)
NUCLEATED RBC %: 0 %
PDW BLD-RTO: 15.5 % (ref 11.7–14.9)
PLATELET # BLD: 359 K/CU MM (ref 140–440)
PMV BLD AUTO: 9.6 FL (ref 7.5–11.1)
POTASSIUM SERPL-SCNC: 4.5 MMOL/L (ref 3.5–5.1)
PRO-BNP: 265 PG/ML
RBC # BLD: 3.52 M/CU MM (ref 4.2–5.4)
SEGMENTED NEUTROPHILS ABSOLUTE COUNT: 3.7 K/CU MM
SEGMENTED NEUTROPHILS RELATIVE PERCENT: 66.1 % (ref 36–66)
SODIUM BLD-SCNC: 136 MMOL/L (ref 135–145)
TOTAL IMMATURE NEUTOROPHIL: 0.02 K/CU MM
TOTAL NUCLEATED RBC: 0 K/CU MM
TOTAL PROTEIN: 6.4 GM/DL (ref 6.4–8.2)
WBC # BLD: 5.6 K/CU MM (ref 4–10.5)

## 2021-07-04 PROCEDURE — 84484 ASSAY OF TROPONIN QUANT: CPT

## 2021-07-04 PROCEDURE — 85025 COMPLETE CBC W/AUTO DIFF WBC: CPT

## 2021-07-04 PROCEDURE — 93970 EXTREMITY STUDY: CPT

## 2021-07-04 PROCEDURE — 83880 ASSAY OF NATRIURETIC PEPTIDE: CPT

## 2021-07-04 PROCEDURE — 80053 COMPREHEN METABOLIC PANEL: CPT

## 2021-07-04 PROCEDURE — 99285 EMERGENCY DEPT VISIT HI MDM: CPT

## 2021-07-04 ASSESSMENT — PAIN SCALES - GENERAL: PAINLEVEL_OUTOF10: 6

## 2021-07-04 ASSESSMENT — PAIN DESCRIPTION - PAIN TYPE: TYPE: ACUTE PAIN

## 2021-07-04 ASSESSMENT — PAIN DESCRIPTION - LOCATION: LOCATION: LEG

## 2021-07-05 ENCOUNTER — APPOINTMENT (OUTPATIENT)
Dept: CT IMAGING | Age: 81
DRG: 300 | End: 2021-07-05
Payer: COMMERCIAL

## 2021-07-05 PROBLEM — O22.30 DVT (DEEP VEIN THROMBOSIS) IN PREGNANCY: Status: ACTIVE | Noted: 2021-07-05

## 2021-07-05 LAB
APTT: 50.4 SECONDS (ref 25.1–37.1)
APTT: >240 SECONDS (ref 25.1–37.1)
EKG ATRIAL RATE: 64 BPM
EKG DIAGNOSIS: NORMAL
EKG P AXIS: 64 DEGREES
EKG P-R INTERVAL: 172 MS
EKG Q-T INTERVAL: 428 MS
EKG QRS DURATION: 82 MS
EKG QTC CALCULATION (BAZETT): 441 MS
EKG R AXIS: 28 DEGREES
EKG T AXIS: 48 DEGREES
EKG VENTRICULAR RATE: 64 BPM
TROPONIN T: <0.01 NG/ML

## 2021-07-05 PROCEDURE — 2580000003 HC RX 258: Performed by: HOSPITALIST

## 2021-07-05 PROCEDURE — 94640 AIRWAY INHALATION TREATMENT: CPT

## 2021-07-05 PROCEDURE — 6370000000 HC RX 637 (ALT 250 FOR IP): Performed by: HOSPITALIST

## 2021-07-05 PROCEDURE — 2580000003 HC RX 258: Performed by: CLINICAL NURSE SPECIALIST

## 2021-07-05 PROCEDURE — 6360000002 HC RX W HCPCS: Performed by: INTERNAL MEDICINE

## 2021-07-05 PROCEDURE — 6360000002 HC RX W HCPCS: Performed by: HOSPITALIST

## 2021-07-05 PROCEDURE — 2580000003 HC RX 258: Performed by: PHYSICIAN ASSISTANT

## 2021-07-05 PROCEDURE — 6360000002 HC RX W HCPCS: Performed by: CLINICAL NURSE SPECIALIST

## 2021-07-05 PROCEDURE — 89220 SPUTUM SPECIMEN COLLECTION: CPT

## 2021-07-05 PROCEDURE — 93010 ELECTROCARDIOGRAM REPORT: CPT | Performed by: INTERNAL MEDICINE

## 2021-07-05 PROCEDURE — 96374 THER/PROPH/DIAG INJ IV PUSH: CPT

## 2021-07-05 PROCEDURE — 85730 THROMBOPLASTIN TIME PARTIAL: CPT

## 2021-07-05 PROCEDURE — 94664 DEMO&/EVAL PT USE INHALER: CPT

## 2021-07-05 PROCEDURE — 6360000002 HC RX W HCPCS: Performed by: PHYSICIAN ASSISTANT

## 2021-07-05 PROCEDURE — 6360000004 HC RX CONTRAST MEDICATION: Performed by: PHYSICIAN ASSISTANT

## 2021-07-05 PROCEDURE — 1200000000 HC SEMI PRIVATE

## 2021-07-05 PROCEDURE — 71275 CT ANGIOGRAPHY CHEST: CPT

## 2021-07-05 PROCEDURE — 93005 ELECTROCARDIOGRAM TRACING: CPT | Performed by: PHYSICIAN ASSISTANT

## 2021-07-05 PROCEDURE — 6370000000 HC RX 637 (ALT 250 FOR IP): Performed by: PHYSICIAN ASSISTANT

## 2021-07-05 PROCEDURE — 99222 1ST HOSP IP/OBS MODERATE 55: CPT | Performed by: INTERNAL MEDICINE

## 2021-07-05 PROCEDURE — 94761 N-INVAS EAR/PLS OXIMETRY MLT: CPT

## 2021-07-05 RX ORDER — ALBUTEROL SULFATE 90 UG/1
2 AEROSOL, METERED RESPIRATORY (INHALATION) EVERY 6 HOURS PRN
Status: DISCONTINUED | OUTPATIENT
Start: 2021-07-05 | End: 2021-07-07

## 2021-07-05 RX ORDER — POLYETHYLENE GLYCOL 3350 17 G/17G
17 POWDER, FOR SOLUTION ORAL DAILY PRN
Status: DISCONTINUED | OUTPATIENT
Start: 2021-07-05 | End: 2021-07-14 | Stop reason: HOSPADM

## 2021-07-05 RX ORDER — ONDANSETRON 2 MG/ML
4 INJECTION INTRAMUSCULAR; INTRAVENOUS EVERY 6 HOURS PRN
Status: DISCONTINUED | OUTPATIENT
Start: 2021-07-05 | End: 2021-07-14 | Stop reason: HOSPADM

## 2021-07-05 RX ORDER — FAMOTIDINE 20 MG/1
20 TABLET, FILM COATED ORAL 2 TIMES DAILY
Status: DISCONTINUED | OUTPATIENT
Start: 2021-07-05 | End: 2021-07-14 | Stop reason: HOSPADM

## 2021-07-05 RX ORDER — HYDROCODONE BITARTRATE AND ACETAMINOPHEN 5; 325 MG/1; MG/1
1 TABLET ORAL ONCE
Status: COMPLETED | OUTPATIENT
Start: 2021-07-05 | End: 2021-07-05

## 2021-07-05 RX ORDER — MAGNESIUM SULFATE IN WATER 40 MG/ML
2000 INJECTION, SOLUTION INTRAVENOUS PRN
Status: DISCONTINUED | OUTPATIENT
Start: 2021-07-05 | End: 2021-07-14 | Stop reason: HOSPADM

## 2021-07-05 RX ORDER — POTASSIUM CHLORIDE 7.45 MG/ML
10 INJECTION INTRAVENOUS PRN
Status: DISCONTINUED | OUTPATIENT
Start: 2021-07-05 | End: 2021-07-14 | Stop reason: HOSPADM

## 2021-07-05 RX ORDER — MAGNESIUM OXIDE 400 MG/1
200 TABLET ORAL DAILY
Status: DISCONTINUED | OUTPATIENT
Start: 2021-07-05 | End: 2021-07-14 | Stop reason: HOSPADM

## 2021-07-05 RX ORDER — ASPIRIN 81 MG/1
81 TABLET, CHEWABLE ORAL DAILY
Status: DISCONTINUED | OUTPATIENT
Start: 2021-07-05 | End: 2021-07-14 | Stop reason: HOSPADM

## 2021-07-05 RX ORDER — SODIUM CHLORIDE 9 MG/ML
25 INJECTION, SOLUTION INTRAVENOUS PRN
Status: DISCONTINUED | OUTPATIENT
Start: 2021-07-05 | End: 2021-07-14 | Stop reason: HOSPADM

## 2021-07-05 RX ORDER — SODIUM CHLORIDE 0.9 % (FLUSH) 0.9 %
5-40 SYRINGE (ML) INJECTION 2 TIMES DAILY
Status: DISCONTINUED | OUTPATIENT
Start: 2021-07-05 | End: 2021-07-14 | Stop reason: HOSPADM

## 2021-07-05 RX ORDER — METOPROLOL TARTRATE 50 MG/1
50 TABLET, FILM COATED ORAL 2 TIMES DAILY
Status: DISCONTINUED | OUTPATIENT
Start: 2021-07-05 | End: 2021-07-09

## 2021-07-05 RX ORDER — ALBUTEROL SULFATE 90 UG/1
2 AEROSOL, METERED RESPIRATORY (INHALATION) 4 TIMES DAILY
Status: DISCONTINUED | OUTPATIENT
Start: 2021-07-05 | End: 2021-07-07

## 2021-07-05 RX ORDER — POTASSIUM CHLORIDE 20 MEQ/1
40 TABLET, EXTENDED RELEASE ORAL DAILY
Status: DISCONTINUED | OUTPATIENT
Start: 2021-07-05 | End: 2021-07-07

## 2021-07-05 RX ORDER — POTASSIUM CHLORIDE 20 MEQ/1
40 TABLET, EXTENDED RELEASE ORAL PRN
Status: DISCONTINUED | OUTPATIENT
Start: 2021-07-05 | End: 2021-07-14 | Stop reason: HOSPADM

## 2021-07-05 RX ORDER — CEPHALEXIN 250 MG/1
500 CAPSULE ORAL ONCE
Status: COMPLETED | OUTPATIENT
Start: 2021-07-05 | End: 2021-07-05

## 2021-07-05 RX ORDER — ACETAMINOPHEN 325 MG/1
650 TABLET ORAL EVERY 6 HOURS PRN
Status: DISCONTINUED | OUTPATIENT
Start: 2021-07-05 | End: 2021-07-14 | Stop reason: HOSPADM

## 2021-07-05 RX ORDER — SODIUM CHLORIDE 0.9 % (FLUSH) 0.9 %
5-40 SYRINGE (ML) INJECTION EVERY 12 HOURS SCHEDULED
Status: DISCONTINUED | OUTPATIENT
Start: 2021-07-05 | End: 2021-07-14 | Stop reason: HOSPADM

## 2021-07-05 RX ORDER — HEPARIN SODIUM 1000 [USP'U]/ML
80 INJECTION, SOLUTION INTRAVENOUS; SUBCUTANEOUS PRN
Status: DISCONTINUED | OUTPATIENT
Start: 2021-07-05 | End: 2021-07-05

## 2021-07-05 RX ORDER — ACETAMINOPHEN 650 MG/1
650 SUPPOSITORY RECTAL EVERY 6 HOURS PRN
Status: DISCONTINUED | OUTPATIENT
Start: 2021-07-05 | End: 2021-07-14 | Stop reason: HOSPADM

## 2021-07-05 RX ORDER — HEPARIN SODIUM 1000 [USP'U]/ML
80 INJECTION, SOLUTION INTRAVENOUS; SUBCUTANEOUS ONCE
Status: COMPLETED | OUTPATIENT
Start: 2021-07-05 | End: 2021-07-05

## 2021-07-05 RX ORDER — HEPARIN SODIUM 10000 [USP'U]/100ML
5-30 INJECTION, SOLUTION INTRAVENOUS CONTINUOUS
Status: DISCONTINUED | OUTPATIENT
Start: 2021-07-05 | End: 2021-07-05

## 2021-07-05 RX ORDER — SODIUM CHLORIDE 0.9 % (FLUSH) 0.9 %
5-40 SYRINGE (ML) INJECTION PRN
Status: DISCONTINUED | OUTPATIENT
Start: 2021-07-05 | End: 2021-07-14 | Stop reason: HOSPADM

## 2021-07-05 RX ORDER — HEPARIN SODIUM 1000 [USP'U]/ML
40 INJECTION, SOLUTION INTRAVENOUS; SUBCUTANEOUS PRN
Status: DISCONTINUED | OUTPATIENT
Start: 2021-07-05 | End: 2021-07-05

## 2021-07-05 RX ORDER — FUROSEMIDE 20 MG/1
20 TABLET ORAL 2 TIMES DAILY
Status: DISCONTINUED | OUTPATIENT
Start: 2021-07-05 | End: 2021-07-14 | Stop reason: HOSPADM

## 2021-07-05 RX ORDER — ONDANSETRON 4 MG/1
4 TABLET, ORALLY DISINTEGRATING ORAL EVERY 8 HOURS PRN
Status: DISCONTINUED | OUTPATIENT
Start: 2021-07-05 | End: 2021-07-14 | Stop reason: HOSPADM

## 2021-07-05 RX ORDER — HYDROCODONE BITARTRATE AND ACETAMINOPHEN 5; 325 MG/1; MG/1
1 TABLET ORAL EVERY 6 HOURS PRN
Status: DISCONTINUED | OUTPATIENT
Start: 2021-07-05 | End: 2021-07-14 | Stop reason: HOSPADM

## 2021-07-05 RX ADMIN — METOPROLOL TARTRATE 50 MG: 50 TABLET, FILM COATED ORAL at 20:24

## 2021-07-05 RX ADMIN — VANCOMYCIN HYDROCHLORIDE 1750 MG: 5 INJECTION, POWDER, LYOPHILIZED, FOR SOLUTION INTRAVENOUS at 05:26

## 2021-07-05 RX ADMIN — HYDROCODONE BITARTRATE AND ACETAMINOPHEN 1 TABLET: 5; 325 TABLET ORAL at 08:21

## 2021-07-05 RX ADMIN — HEPARIN SODIUM 6780 UNITS: 1000 INJECTION INTRAVENOUS; SUBCUTANEOUS at 02:08

## 2021-07-05 RX ADMIN — HYDROCODONE BITARTRATE AND ACETAMINOPHEN 1 TABLET: 5; 325 TABLET ORAL at 20:23

## 2021-07-05 RX ADMIN — IOPAMIDOL 75 ML: 755 INJECTION, SOLUTION INTRAVENOUS at 03:43

## 2021-07-05 RX ADMIN — POTASSIUM CHLORIDE 40 MEQ: 1500 TABLET, EXTENDED RELEASE ORAL at 15:19

## 2021-07-05 RX ADMIN — CEPHALEXIN 500 MG: 250 CAPSULE ORAL at 02:24

## 2021-07-05 RX ADMIN — SODIUM CHLORIDE, PRESERVATIVE FREE 10 ML: 5 INJECTION INTRAVENOUS at 15:20

## 2021-07-05 RX ADMIN — ALBUTEROL SULFATE 2 PUFF: 90 AEROSOL, METERED RESPIRATORY (INHALATION) at 19:30

## 2021-07-05 RX ADMIN — ASPIRIN 81 MG: 81 TABLET, CHEWABLE ORAL at 14:26

## 2021-07-05 RX ADMIN — SODIUM CHLORIDE, PRESERVATIVE FREE 10 ML: 5 INJECTION INTRAVENOUS at 20:24

## 2021-07-05 RX ADMIN — HEPARIN SODIUM AND DEXTROSE 18 UNITS/KG/HR: 10000; 5 INJECTION INTRAVENOUS at 08:36

## 2021-07-05 RX ADMIN — MAGNESIUM OXIDE 400 MG (241.3 MG MAGNESIUM) TABLET 200 MG: TABLET at 14:27

## 2021-07-05 RX ADMIN — ALBUTEROL SULFATE 2 PUFF: 90 AEROSOL, METERED RESPIRATORY (INHALATION) at 07:24

## 2021-07-05 RX ADMIN — ALBUTEROL SULFATE 2 PUFF: 90 AEROSOL, METERED RESPIRATORY (INHALATION) at 11:27

## 2021-07-05 RX ADMIN — Medication 2 PUFF: at 19:31

## 2021-07-05 RX ADMIN — HYDROCODONE BITARTRATE AND ACETAMINOPHEN 1 TABLET: 5; 325 TABLET ORAL at 14:24

## 2021-07-05 RX ADMIN — FAMOTIDINE 20 MG: 20 TABLET ORAL at 20:24

## 2021-07-05 RX ADMIN — HYDROCODONE BITARTRATE AND ACETAMINOPHEN 1 TABLET: 5; 325 TABLET ORAL at 02:24

## 2021-07-05 RX ADMIN — VANCOMYCIN HYDROCHLORIDE 1250 MG: 5 INJECTION, POWDER, LYOPHILIZED, FOR SOLUTION INTRAVENOUS at 23:59

## 2021-07-05 RX ADMIN — ENOXAPARIN SODIUM 80 MG: 80 INJECTION SUBCUTANEOUS at 20:24

## 2021-07-05 RX ADMIN — Medication 2 PUFF: at 07:22

## 2021-07-05 RX ADMIN — METOPROLOL TARTRATE 50 MG: 50 TABLET, FILM COATED ORAL at 14:20

## 2021-07-05 RX ADMIN — ENOXAPARIN SODIUM 80 MG: 80 INJECTION SUBCUTANEOUS at 14:18

## 2021-07-05 RX ADMIN — FAMOTIDINE 20 MG: 20 TABLET ORAL at 14:17

## 2021-07-05 RX ADMIN — Medication 2 PUFF: at 11:25

## 2021-07-05 RX ADMIN — ONDANSETRON 4 MG: 2 INJECTION INTRAMUSCULAR; INTRAVENOUS at 18:47

## 2021-07-05 ASSESSMENT — PAIN DESCRIPTION - PAIN TYPE
TYPE: ACUTE PAIN
TYPE: ACUTE PAIN

## 2021-07-05 ASSESSMENT — PAIN SCALES - GENERAL
PAINLEVEL_OUTOF10: 9
PAINLEVEL_OUTOF10: 5
PAINLEVEL_OUTOF10: 8
PAINLEVEL_OUTOF10: 3
PAINLEVEL_OUTOF10: 9
PAINLEVEL_OUTOF10: 8

## 2021-07-05 ASSESSMENT — PAIN DESCRIPTION - DESCRIPTORS: DESCRIPTORS: ACHING

## 2021-07-05 ASSESSMENT — PAIN DESCRIPTION - ONSET: ONSET: ON-GOING

## 2021-07-05 ASSESSMENT — PAIN DESCRIPTION - LOCATION
LOCATION: GENERALIZED
LOCATION: LEG

## 2021-07-05 ASSESSMENT — PAIN DESCRIPTION - ORIENTATION: ORIENTATION: RIGHT;LEFT

## 2021-07-05 ASSESSMENT — PAIN DESCRIPTION - PROGRESSION: CLINICAL_PROGRESSION: GRADUALLY WORSENING

## 2021-07-05 ASSESSMENT — PAIN DESCRIPTION - FREQUENCY: FREQUENCY: CONTINUOUS

## 2021-07-05 ASSESSMENT — PAIN - FUNCTIONAL ASSESSMENT: PAIN_FUNCTIONAL_ASSESSMENT: ACTIVITIES ARE NOT PREVENTED

## 2021-07-05 ASSESSMENT — PAIN DESCRIPTION - DIRECTION: RADIATING_TOWARDS: DOWN

## 2021-07-05 NOTE — ED TRIAGE NOTES
Pt. Presents to the ER via EMS with c/o of leg swelling and leg pain. Patient states this is bilateral and began on June 26th. Pt. States she has not been seen yet. Pt. States the burning sensation is worse in her left. Patients legs have noted swelling and tenderness. Patient is alert & oriented x 4. Pt. Lives at home by herself.

## 2021-07-05 NOTE — H&P
touch and very very sensitive to light touch as well. She denies any drainage. Denies any fever chills rigors. Denies any dysuria hematuria melena hematochezia. Denies any fall. Denies any chest pain. She is on Eliquis and she has been compliant with her medications. Earlier she was unable to tolerate Coumadin as her INR was too high. In the ER the venous Doppler found another DVT in the right superficial vein in the popliteal vein despite being on Eliquis. This seems to be treatment failure rather than a compliance issue. I am not sure if she has an underlying malignancy which is causing her to be hypercoagulable. We will consult hematology oncology for further input. Continue intravenous heparin in the meantime. Continue vancomycin for cellulitis. CT PE studies are pending at this time. Her serum chemistries in the ER were acceptable her liver function tests were acceptable. Her hematology labs were acceptable. Further therapy would depend on hospital course the patient overall prognosis Melgaard. Ten point ROS reviewed negative, unless as noted above    Objective:   No intake or output data in the 24 hours ending 07/05/21 0311   Vitals:   Vitals:    07/05/21 0222   BP: 109/63   Pulse: 82   Resp: 18   Temp:    SpO2: 97%     Physical Exam:   GEN Awake female, sitting upright in bed in no apparent distress. Appears given age. EYES Pupils are equally round. No scleral erythema, discharge, or conjunctivitis. HENT Mucous membranes are moist. Oral pharynx without exudates, no evidence of thrush. NECK Supple, no apparent thyromegaly or masses. RESP Clear to auscultation, no wheezes, rales or rhonchi. Symmetric chest movement while on room air. CARDIO/VASC S1/S2 auscultated. Regular rate without appreciable murmurs, rubs, or gallops. No JVD or carotid bruits. Peripheral pulses equal bilaterally and palpable. No peripheral edema.   GI Abdomen is soft without significant tenderness, masses, or guarding. Bowel sounds are normoactive. Rectal exam deferred.  No costovertebral angle tenderness. Normal appearing external genitalia. Allen catheter is not present. HEME/LYMPH No palpable cervical lymphadenopathy and no hepatosplenomegaly. No petechiae or ecchymoses. MSK No gross joint deformities. SKIN Normal coloration, warm, dry. Bilateral shins are erythematous, hyperemic and very sensitive to touch. NEURO Cranial nerves appear grossly intact, normal speech, no lateralizing weakness. PSYCH Awake, alert, oriented x 4. Affect appropriate. Past Medical History:      Past Medical History:   Diagnosis Date    Arthritis     DVT of deep femoral vein ([facility] [address] 2016    H/O echocardiogram Limited 2019    MOD AS, JOLENE 1.3 sq    History of echocardiogram 2019    EF >60% mild to moderate TR , MOD AS JOLENE 1.17 sq, Mod PHTN, Grade II DD.  Hypertension     PAD (peripheral artery disease) (HCC)     Pneumonia     Seizures (HCC)     Vertigo      PSHX:  has a past surgical history that includes  section; Endoscopy, colon, diagnostic; and Colonoscopy. Allergies: Allergies   Allergen Reactions    Coumadin [Warfarin Sodium] Hives     hives    Ambien [Zolpidem Tartrate] Other (See Comments)     hallucinations    Morphine Sulfate Other (See Comments)     agitation       FAM HX: family history includes Cancer in her mother. Soc HX:   Social History     Socioeconomic History    Marital status:       Spouse name: None    Number of children: None    Years of education: None    Highest education level: None   Occupational History    None   Tobacco Use    Smoking status: Former Smoker     Packs/day: 0.25     Years: 35.00     Pack years: 8.75    Smokeless tobacco: Never Used   Vaping Use    Vaping Use: Never used   Substance and Sexual Activity    Alcohol use: No     Comment: social    Drug use: No    Sexual activity: Not Currently   Other Topics Concern    None   Social History Narrative    None     Social Determinants of Health     Financial Resource Strain:     Difficulty of Paying Living Expenses:    Food Insecurity:     Worried About Running Out of Food in the Last Year:     [address] in the Last Year:    Transportation Needs:     Lack of Transportation (Medical):      Lack of Transportation (Non-Medical):    Physical Activity:     Days of Exercise per Week:     Minutes of Exercise per Session:    Stress:     Feeling of Stress :    Social Connections:     Frequency of Communication with Friends and Family:     Frequency of Social Gatherings with Friends and Family:     Attends [employer] Services:     Active Member of Clubs or Organizations:     Attends Club or Organization Meetings:     Marital Status:    Intimate Partner Violence:     Fear of Current or Ex-Partner:     Emotionally Abused:     Physically Abused:     Sexually Abused:        Data:   CBC with Differential:    Lab Results   Component Value Date    WBC 5.6 07/04/2021    RBC 3.52 07/04/2021    HGB 12.0 07/04/2021    HCT 36.1 07/04/2021     07/04/2021    .6 07/04/2021    MCH 34.1 07/04/2021    MCHC 33.2 07/04/2021    RDW 15.5 07/04/2021    SEGSPCT 66.1 07/04/2021    LYMPHOPCT 20.3 07/04/2021    MONOPCT 9.1 07/04/2021    EOSPCT 3.6 04/03/2012    BASOPCT 1.2 07/04/2021    MONOSABS 0.5 07/04/2021    LYMPHSABS 1.1 07/04/2021    EOSABS 0.2 07/04/2021    BASOSABS 0.1 07/04/2021    DIFFTYPE AUTOMATED DIFFERENTIAL 07/04/2021       CMP:     Lab Results   Component Value Date     07/04/2021    K 4.5 07/04/2021     07/04/2021    CO2 25 07/04/2021    BUN 10 07/04/2021    CREATININE 0.7 07/04/2021    GFRAA >60 07/04/2021    LABGLOM >60 07/04/2021    GLUCOSE 90 07/04/2021    PROT 6.4 07/04/2021    PROT 7.4 08/10/2012    LABALBU 3.9 07/04/2021    CALCIUM 9.3 07/04/2021    BILITOT 0.3 07/04/2021    ALKPHOS 52 07/04/2021    AST 17 07/04/2021    ALT 9 07/04/2021       Troponin:  Lab Results   Component Value Date    TROPONINT <0.010 07/04/2021       U/A:    Lab Results   Component Value Date    COLORU YELLOW 03/09/2012    PROTEINU NEGATIVE 03/09/2012    WBCUA 1 03/09/2012    RBCUA <1 03/09/2012    MUCUS RARE 03/09/2012    BACTERIA OCCASIONAL 03/09/2012    CLARITYU CLEAR 03/09/2012    SPECGRAV 1.013 03/09/2012    LEUKOCYTESUR NEGATIVE 03/09/2012    UROBILINOGEN 0.2 03/09/2012    BILIRUBINUR NEGATIVE 03/09/2012    BLOODU NEGATIVE 03/09/2012       Urine Culture:  No components found for: JAVIERLUIS    Radiology results:  VL DUP LOWER EXTREMITY VENOUS BILATERAL   Final Result   DVT within the right superficial femoral vein and popliteal vein which may be   acute on chronic. No evidence of DVT within the left lower extremity. Nondiagnostic evaluation below the knees bilaterally due to edema.          CTA PULMONARY W CONTRAST    (Results Pending)         Medications:   Medications:    furosemide  20 mg Oral BID    aspirin  81 mg Oral Daily    metoprolol tartrate  50 mg Oral BID    potassium chloride  40 mEq Oral Daily    magnesium  30 mg Oral Daily    sodium chloride flush  5-40 mL Intravenous 2 times per day    famotidine  20 mg Oral BID      Infusions:    heparin (PORCINE) Infusion 18 Units/kg/hr (07/05/21 0209)    sodium chloride       PRN Meds: heparin (porcine), 80 Units/kg, PRN  heparin (porcine), 40 Units/kg, PRN  HYDROcodone-acetaminophen, 1 tablet, Q6H PRN  albuterol sulfate HFA, 2 puff, Q6H PRN  sodium chloride flush, 5-40 mL, PRN  sodium chloride, 25 mL, PRN  ondansetron, 4 mg, Q8H PRN   Or  ondansetron, 4 mg, Q6H PRN  polyethylene glycol, 17 g, Daily PRN  acetaminophen, 650 mg, Q6H PRN   Or  acetaminophen, 650 mg, Q6H PRN  potassium chloride, 40 mEq, PRN   Or  potassium alternative oral replacement, 40 mEq, PRN   Or  potassium chloride, 10 mEq, PRN  magnesium sulfate, 2,000 mg, PRN          Electronically signed by Berlin Stinson MD on 7/5/2021 at 3:11 AM

## 2021-07-05 NOTE — PROGRESS NOTES
[address] [facility]  consulted by Dr. Blake Ibrahim for monitoring and adjustment. Indication for treatment: Cellulitis  Goal trough: 10 - 15 mcg/mL    Pertinent Laboratory Values:   Temp Readings from Last 3 Encounters:   07/04/21 98.7 °F (37.1 °C) (Oral)   02/10/21 98.2 °F (36.8 °C)   06/18/19 98.6 °F (37 °C) (Oral)     Recent Labs     07/04/21  2139   WBC 5.6     Recent Labs     07/04/21  2139   BUN 10   CREATININE 0.7     Estimated Creatinine Clearance: 73 mL/min (based on SCr of 0.7 mg/dL). No intake or output data in the 24 hours ending 07/05/21 0352    PAssessment:  WBC WNL at 5.6; Afebrile  SCr = 0.7, BUN = 10, No I/O data  Day(s) of therapy: 1  Vancomycin concentration:   07/07 - To be collected    Plan:  Vancomycin 1,750 mg IV given in ED; Follow with Vancomycin 1,250 mg IV Q 18 Hours  Check Vancomycin trough prior to fourth dose  Pharmacy will continue to monitor patient and adjust therapy as indicated    Kaia 3 07/07/2021 @ 10 am    Thank you for the consult.   DOROTA Mckeon [facility], [address]   7/5/2021 3:52 AM

## 2021-07-05 NOTE — ED NOTES
[phone] paged admitting hospitalist for [facility].       Lyman Feeling  07/05/21 4899  [id number] repaged admitting hospitalist for [facility]  07/05/21 8325

## 2021-07-05 NOTE — ED PROVIDER NOTES
12 lead EKG per my interpretation:  Normal Sinus Rhythm 64  Axis is   Normal  QTc is  441  There is no specific T wave changes appreciated. There is no specific ST wave changes appreciated.     Prior EKG to compare with was not available         Juan C Crowe DO  07/05/21 0117

## 2021-07-05 NOTE — ED NOTES
Patient escorted to bathroom at this time. Patient ambulatory with cane. Patient urine specimen sent at this time.      Carina Dumont RN  07/05/21 6672

## 2021-07-05 NOTE — ED PROVIDER NOTES
EMERGENCY DEPARTMENT ENCOUNTER        PCP: Carolina Moreno, APRN - [place] [id number]    Chief Complaint   Patient presents with    Leg Swelling     bilateral    Leg Pain     bilateral       This patient was not evaluated by the attending physician. I have independently evaluated this patient. My supervising physician was available for consultation. HPI      Apolinar Castro is a 79 y.o. female former smoker with PMH of PE, DVT, HTN, PAD who presents with bilateral lower extremity pain and swelling. Patient reports that symptoms began gradually starting 6/26/21. Patient reports that both her legs have been swollen and painful with her left leg hurting more than her right leg but being equally swollen. Patient reports that swelling seems to worsen throughout the day but does improve overnight when she sleeps with her legs propped up. Patient describes pain as burning and aching. Patient reports associated redness of left lower leg. Aggravating factors for pain or ambulation and palpation. Severity of pain is rated 6 out of 10. Patient does take Norco for chronic pain reports slight improvement in pain when she takes this. Patient reports compliance with her Eliquis reports that she has not missed any dosages of this medication. Patient reports cardiologist is Dr. Jimena Quiroz. Patient denies chest pain, shortness of breath, numbness, weakness, tingling, recent trauma or injury, abdominal pain. REVIEW OF SYSTEMS    Constitutional:  Denies diaphoresis, fever, chills  HENT:  Denies sore throat or ear pain   Cardiovascular:  Denies palpitations, chest pain  Respiratory:  Denies shortness of breath, cough, hemoptysis    GI:  Denies abdominal pain, nausea, vomiting, or diarrhea  :  Denies any urinary symptoms  Musculoskeletal:  See HPI;  Denies back pain  Skin:  + redness of left lower leg  Neurologic:  Denies syncope, lightheadedness, dizziness, headache, focal weakness or sensory changes Occupational History    None   Tobacco Use    Smoking status: Former Smoker     Packs/day: 0.25     Years: 35.00     Pack years: 8.75    Smokeless tobacco: Never Used   Vaping Use    Vaping Use: Never used   Substance and Sexual Activity    Alcohol use: No     Comment: social    Drug use: No    Sexual activity: Not Currently   Other Topics Concern    None   Social History Narrative    None     Social Determinants of Health     Financial Resource Strain:     Difficulty of Paying Living Expenses:    Food Insecurity:     Worried About Running Out of Food in the Last Year:     Ran Out of Food in the Last Year:    Transportation Needs:     Lack of Transportation (Medical):      Lack of Transportation (Non-Medical):    Physical Activity:     Days of Exercise per Week:     Minutes of Exercise per Session:    Stress:     Feeling of Stress :    Social Connections:     Frequency of Communication with Friends and Family:     Frequency of Social Gatherings with Friends and Family:     Attends Worship Services:     Active Member of Clubs or Organizations:     Attends Club or Organization Meetings:     Marital Status:    Intimate Partner Violence:     Fear of Current or Ex-Partner:     Emotionally Abused:     Physically Abused:     Sexually Abused:      Family History   Problem Relation Age of Onset    Cancer Mother         BREAST BILAT       PHYSICAL EXAM    VITAL SIGNS: /67   Pulse 79   Temp 98.7 °F (37.1 °C) (Oral)   Resp 24   Ht 5' 11\" (1.803 m)   Wt 187 lb (84.8 kg)   SpO2 96%   BMI 26.08 kg/m²    Constitutional:  Well developed, well nourished, no acute distress   HENT:  Atraumatic, moist mucus membranes  Neck/Lymphatics: supple, no JVD, no swollen nodes  Respiratory:  Lungs Clear, no retractions, no wheezing, rhonchi, rales, no accessory muscle usage  Cardiovascular:  Rate regular, regular Rhythm  Vascular: Radial and DP pulses 2+ equal bilaterally  GI:  Soft, nontender, normal bowel sounds  Musculoskeletal:  No acute gross deformities. Compartments are soft in bilateral lower extremities. No thigh tenderness to palpation bilaterally. No palpable cord of the bilateral lower extremities. There is 1+ to 2+ pitting edema the bilateral lower extremities that is symmetric. There is mild diffuse tenderness palpation of the bilateral lower legs left worse than right. Patient has full active range of motion of bilateral ankles, knees, hips without significant pain. Integument:  Skin warm and dry, no petechiae. There is mild macular erythema of the left lower leg over the anterior and lateral aspects with slight induration without fluctuance or crepitus present. No lymphangitic streaking. No petechiae, purpura, vesicles, bullae. Neurologic:  Alert & oriented, normal speech. Bilateral lower extremities distally neurovascular intact.   Psych: Pleasant affect, no hallucinations        LABS:  Results for orders placed or performed during the hospital encounter of 07/04/21   CBC Auto Differential   Result Value Ref Range    WBC 5.6 4.0 - 10.5 K/CU MM    RBC 3.52 (L) 4.2 - 5.4 M/CU MM    Hemoglobin 12.0 (L) 12.5 - 16.0 GM/DL    Hematocrit 36.1 (L) 37 - 47 %    .6 (H) 78 - 100 FL    MCH 34.1 (H) 27 - 31 PG    MCHC 33.2 32.0 - 36.0 %    RDW 15.5 (H) 11.7 - 14.9 %    Platelets 248 374 - 249 K/CU MM    MPV 9.6 7.5 - 11.1 FL    Differential Type AUTOMATED DIFFERENTIAL     Segs Relative 66.1 (H) 36 - 66 %    Lymphocytes % 20.3 (L) 24 - 44 %    Monocytes % 9.1 (H) 0 - 4 %    Eosinophils % 2.9 0 - 3 %    Basophils % 1.2 (H) 0 - 1 %    Segs Absolute 3.7 K/CU MM    Lymphocytes Absolute 1.1 K/CU MM    Monocytes Absolute 0.5 K/CU MM    Eosinophils Absolute 0.2 K/CU MM    Basophils Absolute 0.1 K/CU MM    Nucleated RBC % 0.0 %    Total Nucleated RBC 0.0 K/CU MM    Total Immature Neutrophil 0.02 K/CU MM    Immature Neutrophil % 0.4 0 - 0.43 %   Comprehensive Metabolic Panel   Result Value Ref Range Sodium 136 135 - 145 MMOL/L    Potassium 4.5 3.5 - 5.1 MMOL/L    Chloride 100 99 - 110 mMol/L    CO2 25 21 - 32 MMOL/L    BUN 10 6 - 23 MG/DL    CREATININE 0.7 0.6 - 1.1 MG/DL    Glucose 90 70 - 99 MG/DL    Calcium 9.3 8.3 - 10.6 MG/DL    Albumin 3.9 3.4 - 5.0 GM/DL    Total Protein 6.4 6.4 - 8.2 GM/DL    Total Bilirubin 0.3 0.0 - 1.0 MG/DL    ALT 9 (L) 10 - 40 U/L    AST 17 15 - 37 IU/L    Alkaline Phosphatase 52 40 - 129 IU/L    GFR Non-African American >60 >60 mL/min/1.73m2    GFR African American >60 >60 mL/min/1.73m2    Anion Gap 11 4 - 16   Brain Natriuretic Peptide   Result Value Ref Range    Pro-.0 <300 PG/ML   Troponin   Result Value Ref Range    Troponin T <0.010 <0.01 NG/ML   EKG 12 Lead   Result Value Ref Range    Ventricular Rate 64 BPM    Atrial Rate 64 BPM    P-R Interval 172 ms    QRS Duration 82 ms    Q-T Interval 428 ms    QTc Calculation (Bazett) 441 ms    P Axis 64 degrees    R Axis 28 degrees    T Axis 48 degrees    Diagnosis       Normal sinus rhythm  Normal ECG  When compared with ECG of 09-FEB-2021 22:46,  Vent. rate has decreased BY  49 BPM         EKG Interpretation  Please see ED physician's note for EKG interpretation - Dr. Lelia Guidry        RADIOLOGY/PROCEDURES    VL DUP LOWER EXTREMITY VENOUS BILATERAL   Final Result   DVT within the right superficial femoral vein and popliteal vein which may be   acute on chronic. No evidence of DVT within the left lower extremity. Nondiagnostic evaluation below the knees bilaterally due to edema. CTA PULMONARY W CONTRAST    (Results Pending)             ED COURSE & MEDICAL DECISION MAKING       Vital signs and nursing notes reviewed during ED course. I have independently evaluated this patient. Supervising physician present in the Emergency Department, available for consultation, throughout entirety of patient care. Patient presents as above with bilateral lower extremity swelling and pain.   There is mild erythema and induration of the left lower extremity and therefore will treat patient for cellulitis with cephalexin. Patient treated with Norco for pain. Labs and imaging were obtained. Labs are without emergent findings. There is mild anemia present. No leukocytosis or leukopenia. BNP is normal for patient's age at 37. Venous duplex ultrasounds of the bilateral lower extremity were obtained today to rule out DVT. Ultrasounds reveal DVT within the right superficial femoral vein and popliteal vein which may be acute on chronic. No evidence of DVT within the left lower extremity. Nondiagnostic evaluation below the knees bilaterally due to edema. During ED course patient reports that she went to the bathroom and when she got up to go to the bathroom she felt short of breath with ambulation. No shortness of breath at this time. EKG and troponin added on for further evaluation. For EKG interpretation-see ED physician's note. Troponin is negative. CTA pulmonary ordered to evaluate new or worsening PE given this acute on chronic DVT despite compliance with Eliquis. Patient started on heparin for DVT given failure of Eliquis. Will admit patient for heparinization of DVT at this time. I consulted with hospitalist, Dr. Sage Seen, we discussed the patient's case. Hospitalist agrees to admit the patient at this time for further evaluation and care. Hospitalist understands that CTA pulmonary is still in process at this time and will follow up on it. All pertinent Lab data and radiographic results reviewed with patient at bedside. The patient and/or the family were informed of the results of any tests/labs/imaging, the treatment plan, and time was allotted to answer questions. Diagnosis, disposition, and treatment plan reviewed in detail with patient. Patient understands and agrees to admission at this time.       In consideration of current COVID19 pandemic, with effort to minimize unnecessary provider exposure, this patient was seen at bedside by me independently. However, in compliance with current hospital KELLY/ED protocol, prior to admission I did discuss this patient case with emergency department physician, Dr. Micheal Gan. Clinical  IMPRESSION    1. Acute deep vein thrombosis (DVT) of right lower extremity, unspecified vein (HCC)    2. Cellulitis of left lower extremity    3. Bilateral lower extremity edema    4. Bilateral leg pain    5. Shortness of breath on exertion        Disposition:  Admission      Comment: Please note this report has been produced using speech recognition software and may contain errors related to that system including errors in grammar, punctuation, and spelling, as well as words and phrases that may be inappropriate. If there are any questions or concerns please feel free to contact the dictating provider for clarification.             Titi Lopez PA-C  07/05/21 1070

## 2021-07-05 NOTE — ED NOTES
Up to the restroom with assistance. The patient complains of urgency of urination times 2 weeks. She sts she was told by her nurse from [facility] to drink lots of water, so she had been doing that and taking lasix twice daily.  Some of her urine was pink initially, but it began to look normal. No urine testing done prior to this, since June 6. [id number]     Isidro Kothari RN  07/05/21 5532

## 2021-07-05 NOTE — ED NOTES
Heparin PRN order discontinued per verbal orders from Dr. Arben Chang.  Radha [facility], [facility]  07/05/21 0425

## 2021-07-05 NOTE — PROGRESS NOTES
Patient seen and examined at bedside. Full H/P per overnight provider. Patient resting comfortably in bed. Tender left lower extremity with some associated erythema. Will continue IV Heparin, IV Vancomycin and monitor.

## 2021-07-05 NOTE — ED NOTES
Report received from Kb, [facility]  07/05/21 2271

## 2021-07-05 NOTE — CONSULTS
2019    EF >60% mild to moderate TR , MOD AS JOLENE 1.17 sq, Mod PHTN, Grade II DD.  Hypertension     PAD (peripheral artery disease) (HCC)     Pneumonia     Seizures (HCC)     Vertigo        SURGICAL HISTORY    Past Surgical History:   Procedure Laterality Date     SECTION      COLONOSCOPY      ENDOSCOPY, COLON, DIAGNOSTIC         FAMILY HISTORY    Family History   Problem Relation Age of Onset    Cancer Mother         BREAST BILAT       SOCIAL HISTORY    Social History     Socioeconomic History    Marital status:      Spouse name: None    Number of children: None    Years of education: None    Highest education level: None   Occupational History    None   Tobacco Use    Smoking status: Former Smoker     Packs/day: 0.25     Years: 35.00     Pack years: 8.75    Smokeless tobacco: Never Used   Vaping Use    Vaping Use: Never used   Substance and Sexual Activity    Alcohol use: No     Comment: social    Drug use: No    Sexual activity: Not Currently   Other Topics Concern    None   Social History Narrative    None     Social Determinants of Health     Financial Resource Strain:     Difficulty of Paying Living Expenses:    Food Insecurity:     Worried About Running Out of Food in the Last Year:     Ran Out of Food in the Last Year:    Transportation Needs:     Lack of Transportation (Medical):      Lack of Transportation (Non-Medical):    Physical Activity:     Days of Exercise per Week:     Minutes of Exercise per Session:    Stress:     Feeling of Stress :    Social Connections:     Frequency of Communication with Friends and Family:     Frequency of Social Gatherings with Friends and Family:     Attends [employer] Services:     Active Member of Clubs or Organizations:     Attends Club or Organization Meetings:     Marital Status:    Intimate Partner Violence:     Fear of Current or Ex-Partner:     Emotionally Abused:     Physically Abused:     Sexually Abused:      REVIEW OF SYSTEMS    Constitutional:  Denies fever, chills, loss of appetite, weight loss, tiredness, fatigue or weakness   HEENT:  Denies swelling of neck glands  Respiratory:  Denies cough, shortness of breath or hemoptysis  Cardiovascular:  Denies chest pain, palpitations or swelling   GI:  Denies abdominal pain, nausea, vomiting, constipation or diarrhea   Musculoskeletal:  Denies back pain. Left lower extremity swelling, redness and tender to touch   Skin:  Denies rash   Neurologic:  Denies headache, focal weakness or sensory changes   All systems negative except as marked. PHYSICAL EXAM    Vitals: /66   Pulse 70   Temp 98.7 °F (37.1 °C) (Oral)   Resp 14   Ht 5' 11\" (1.803 m)   Wt 187 lb (84.8 kg)   SpO2 97%   BMI 26.08 kg/m²   CONSTITUTIONAL: awake, alert, cooperative, no apparent distress   EYES: pupils equal, round and reactive to light, sclera clear and conjunctiva normal  ENT: Normocephalic, without obvious abnormality, atraumatic  NECK: supple, symmetrical, no jugular venous distension and no carotid bruits   HEMATOLOGIC/LYMPHATIC: no cervical, supraclavicular or axillary lymphadenopathy   LUNGS: VBS, no wheezes, no crackles, no rhonchi, no increased work of breathing and clear to auscultation   CARDIOVASCULAR: regular rate and rhythm, normal S1 and S2, no murmur noted  ABDOMEN: normal bowel sounds x 4, soft, non-distended, non-tender, no masses palpated, no hepatosplenomgaly   MUSCULOSKELETAL: full range of motion noted, tone is normal  NEUROLOGIC: awake, alert, oriented to name, place and time. Motor skills grossly intact. SKIN: Normal skin color, texture, turgor and no jaundice.  Skin appears intact   EXTREMITIES: no cyanosis, Left leg swelling, redness and tenderness +, no clubbing    LABORATORY RESULTS  CBC:   Recent Labs     07/04/21 2139   WBC 5.6   HGB 12.0*        BMP:    Recent Labs     07/04/21 2139      K 4.5      CO2 25   BUN 10   CREATININE 0. 7   GLUCOSE 90     Hepatic:   Recent Labs     07/04/21  2139   AST 17   ALT 9*   BILITOT 0.3   ALKPHOS 52     INR: No results for input(s): INR in the last 72 hours. RADIOLOGY REPORTS  CT scan of the chest  Motion limited study.       Redemonstrated segmental filling defect within left lower lobe pulmonary   artery. Otherwise no gross evidence of pulmonary emboli although evaluation   quite limited due to motion artifact.  Normal caliber main pulmonary artery. Lower extremity doppler  DVT within the right superficial femoral vein and popliteal vein which may be   acute on chronic.       No evidence of DVT within the left lower extremity.       Nondiagnostic evaluation below the knees bilaterally due to edema     ASSESSMENT  Left lower extremity recurrent DVT    RECOMMENDATION  She was first diagnosed with left LE DVT ~ 2009 or 2010. She has been on coumadin until 8/17/2015 when she presented with supra therapeutic INR. Coumadin was stooped since 8/15/2015. She then had recurrent left LE DVT on 7/13/2016 and she has been on eliquis since then. Factor V leiden and prothrombin gene mutation done on 817/2015 were negative. Homocysteine was mildly elevated. I recommend to switch her heparin drip to lovenox for now. After 3 to 4 days of therapy with lovenox, I will consider to switch it to pradaxa. 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 We will follow the patient. Thank you for allowing me to participate in the care of this very pleasant patient.

## 2021-07-06 LAB
ALBUMIN SERPL-MCNC: 3.6 GM/DL (ref 3.4–5)
ALP BLD-CCNC: 50 IU/L (ref 40–128)
ALT SERPL-CCNC: 8 U/L (ref 10–40)
ANION GAP SERPL CALCULATED.3IONS-SCNC: 10 MMOL/L (ref 4–16)
APTT: 41.4 SECONDS (ref 25.1–37.1)
APTT: 41.8 SECONDS (ref 25.1–37.1)
AST SERPL-CCNC: 17 IU/L (ref 15–37)
BASOPHILS ABSOLUTE: 0.1 K/CU MM
BASOPHILS RELATIVE PERCENT: 0.9 % (ref 0–1)
BILIRUB SERPL-MCNC: 0.7 MG/DL (ref 0–1)
BUN BLDV-MCNC: 11 MG/DL (ref 6–23)
CALCIUM SERPL-MCNC: 9.5 MG/DL (ref 8.3–10.6)
CHLORIDE BLD-SCNC: 103 MMOL/L (ref 99–110)
CO2: 26 MMOL/L (ref 21–32)
CREAT SERPL-MCNC: 0.4 MG/DL (ref 0.6–1.1)
DIFFERENTIAL TYPE: ABNORMAL
EOSINOPHILS ABSOLUTE: 0.2 K/CU MM
EOSINOPHILS RELATIVE PERCENT: 3.7 % (ref 0–3)
GFR AFRICAN AMERICAN: >60 ML/MIN/1.73M2
GFR NON-AFRICAN AMERICAN: >60 ML/MIN/1.73M2
GLUCOSE BLD-MCNC: 96 MG/DL (ref 70–99)
HCT VFR BLD CALC: 34.6 % (ref 37–47)
HEMOGLOBIN: 11.2 GM/DL (ref 12.5–16)
IMMATURE NEUTROPHIL %: 0.2 % (ref 0–0.43)
INR BLD: 0.96 INDEX
LYMPHOCYTES ABSOLUTE: 1.1 K/CU MM
LYMPHOCYTES RELATIVE PERCENT: 19.6 % (ref 24–44)
MCH RBC QN AUTO: 33.3 PG (ref 27–31)
MCHC RBC AUTO-ENTMCNC: 32.4 % (ref 32–36)
MCV RBC AUTO: 103 FL (ref 78–100)
MONOCYTES ABSOLUTE: 0.5 K/CU MM
MONOCYTES RELATIVE PERCENT: 9.1 % (ref 0–4)
NUCLEATED RBC %: 0 %
PDW BLD-RTO: 15.3 % (ref 11.7–14.9)
PLATELET # BLD: 321 K/CU MM (ref 140–440)
PMV BLD AUTO: 9.5 FL (ref 7.5–11.1)
POTASSIUM SERPL-SCNC: 5.1 MMOL/L (ref 3.5–5.1)
PROTHROMBIN TIME: 12.4 SECONDS (ref 11.7–14.5)
RBC # BLD: 3.36 M/CU MM (ref 4.2–5.4)
SEGMENTED NEUTROPHILS ABSOLUTE COUNT: 3.6 K/CU MM
SEGMENTED NEUTROPHILS RELATIVE PERCENT: 66.5 % (ref 36–66)
SODIUM BLD-SCNC: 139 MMOL/L (ref 135–145)
TOTAL IMMATURE NEUTOROPHIL: 0.01 K/CU MM
TOTAL NUCLEATED RBC: 0 K/CU MM
TOTAL PROTEIN: 6.2 GM/DL (ref 6.4–8.2)
WBC # BLD: 5.4 K/CU MM (ref 4–10.5)

## 2021-07-06 PROCEDURE — 6360000002 HC RX W HCPCS: Performed by: CLINICAL NURSE SPECIALIST

## 2021-07-06 PROCEDURE — 6370000000 HC RX 637 (ALT 250 FOR IP): Performed by: HOSPITALIST

## 2021-07-06 PROCEDURE — 80053 COMPREHEN METABOLIC PANEL: CPT

## 2021-07-06 PROCEDURE — 36415 COLL VENOUS BLD VENIPUNCTURE: CPT

## 2021-07-06 PROCEDURE — 85025 COMPLETE CBC W/AUTO DIFF WBC: CPT

## 2021-07-06 PROCEDURE — 2580000003 HC RX 258: Performed by: HOSPITALIST

## 2021-07-06 PROCEDURE — 2580000003 HC RX 258: Performed by: CLINICAL NURSE SPECIALIST

## 2021-07-06 PROCEDURE — 85610 PROTHROMBIN TIME: CPT

## 2021-07-06 PROCEDURE — 94761 N-INVAS EAR/PLS OXIMETRY MLT: CPT

## 2021-07-06 PROCEDURE — 6360000002 HC RX W HCPCS: Performed by: INTERNAL MEDICINE

## 2021-07-06 PROCEDURE — 94640 AIRWAY INHALATION TREATMENT: CPT

## 2021-07-06 PROCEDURE — 85730 THROMBOPLASTIN TIME PARTIAL: CPT

## 2021-07-06 PROCEDURE — 99232 SBSQ HOSP IP/OBS MODERATE 35: CPT | Performed by: INTERNAL MEDICINE

## 2021-07-06 PROCEDURE — 1200000000 HC SEMI PRIVATE

## 2021-07-06 RX ADMIN — HYDROCODONE BITARTRATE AND ACETAMINOPHEN 1 TABLET: 5; 325 TABLET ORAL at 20:56

## 2021-07-06 RX ADMIN — ALBUTEROL SULFATE 2 PUFF: 90 AEROSOL, METERED RESPIRATORY (INHALATION) at 19:17

## 2021-07-06 RX ADMIN — HYDROCODONE BITARTRATE AND ACETAMINOPHEN 1 TABLET: 5; 325 TABLET ORAL at 02:16

## 2021-07-06 RX ADMIN — Medication 2 PUFF: at 19:17

## 2021-07-06 RX ADMIN — ENOXAPARIN SODIUM 80 MG: 80 INJECTION SUBCUTANEOUS at 08:38

## 2021-07-06 RX ADMIN — SODIUM CHLORIDE, PRESERVATIVE FREE 10 ML: 5 INJECTION INTRAVENOUS at 20:57

## 2021-07-06 RX ADMIN — HYDROCODONE BITARTRATE AND ACETAMINOPHEN 1 TABLET: 5; 325 TABLET ORAL at 14:31

## 2021-07-06 RX ADMIN — METOPROLOL TARTRATE 50 MG: 50 TABLET, FILM COATED ORAL at 08:38

## 2021-07-06 RX ADMIN — ENOXAPARIN SODIUM 80 MG: 80 INJECTION SUBCUTANEOUS at 20:57

## 2021-07-06 RX ADMIN — MAGNESIUM OXIDE 400 MG (241.3 MG MAGNESIUM) TABLET 200 MG: TABLET at 22:52

## 2021-07-06 RX ADMIN — SODIUM CHLORIDE, PRESERVATIVE FREE 10 ML: 5 INJECTION INTRAVENOUS at 08:39

## 2021-07-06 RX ADMIN — Medication 2 PUFF: at 07:51

## 2021-07-06 RX ADMIN — VANCOMYCIN HYDROCHLORIDE 1250 MG: 5 INJECTION, POWDER, LYOPHILIZED, FOR SOLUTION INTRAVENOUS at 17:53

## 2021-07-06 RX ADMIN — HYDROCODONE BITARTRATE AND ACETAMINOPHEN 1 TABLET: 5; 325 TABLET ORAL at 08:38

## 2021-07-06 RX ADMIN — ALBUTEROL SULFATE 2 PUFF: 90 AEROSOL, METERED RESPIRATORY (INHALATION) at 07:50

## 2021-07-06 RX ADMIN — FAMOTIDINE 20 MG: 20 TABLET ORAL at 08:38

## 2021-07-06 RX ADMIN — METOPROLOL TARTRATE 50 MG: 50 TABLET, FILM COATED ORAL at 20:57

## 2021-07-06 RX ADMIN — POTASSIUM CHLORIDE 40 MEQ: 1500 TABLET, EXTENDED RELEASE ORAL at 08:37

## 2021-07-06 RX ADMIN — FAMOTIDINE 20 MG: 20 TABLET ORAL at 20:57

## 2021-07-06 RX ADMIN — ASPIRIN 81 MG: 81 TABLET, CHEWABLE ORAL at 08:38

## 2021-07-06 ASSESSMENT — PAIN DESCRIPTION - PROGRESSION
CLINICAL_PROGRESSION: GRADUALLY WORSENING
CLINICAL_PROGRESSION: NOT CHANGED

## 2021-07-06 ASSESSMENT — PAIN DESCRIPTION - FREQUENCY
FREQUENCY: CONTINUOUS

## 2021-07-06 ASSESSMENT — PAIN SCALES - GENERAL
PAINLEVEL_OUTOF10: 10
PAINLEVEL_OUTOF10: 8
PAINLEVEL_OUTOF10: 8
PAINLEVEL_OUTOF10: 5
PAINLEVEL_OUTOF10: 9

## 2021-07-06 ASSESSMENT — PAIN - FUNCTIONAL ASSESSMENT: PAIN_FUNCTIONAL_ASSESSMENT: ACTIVITIES ARE NOT PREVENTED

## 2021-07-06 ASSESSMENT — PAIN DESCRIPTION - DESCRIPTORS
DESCRIPTORS: ACHING;CONSTANT
DESCRIPTORS: ACHING
DESCRIPTORS: ACHING;THROBBING

## 2021-07-06 ASSESSMENT — PAIN DESCRIPTION - ORIENTATION
ORIENTATION: LEFT
ORIENTATION: RIGHT;LEFT
ORIENTATION: RIGHT;LEFT

## 2021-07-06 ASSESSMENT — PAIN DESCRIPTION - PAIN TYPE
TYPE: ACUTE PAIN
TYPE: ACUTE PAIN;CHRONIC PAIN
TYPE: CHRONIC PAIN
TYPE: CHRONIC PAIN

## 2021-07-06 ASSESSMENT — PAIN DESCRIPTION - LOCATION
LOCATION: GENERALIZED;LEG
LOCATION: GENERALIZED
LOCATION: GENERALIZED

## 2021-07-06 ASSESSMENT — PAIN DESCRIPTION - ONSET
ONSET: ON-GOING
ONSET: ON-GOING

## 2021-07-06 ASSESSMENT — PAIN DESCRIPTION - DIRECTION: RADIATING_TOWARDS: DOWN LEGS

## 2021-07-06 NOTE — PROGRESS NOTES
[address] [facility]  consulted by Dr. Ness Ahmadi for monitoring and adjustment. Indication for treatment: Cellulitis  Goal trough: 10 - 15 mcg/mL    Pertinent Laboratory Values:   Temp Readings from Last 3 Encounters:   07/06/21 98.2 °F (36.8 °C) (Oral)   02/10/21 98.2 °F (36.8 °C)   06/18/19 98.6 °F (37 °C) (Oral)     Recent Labs     07/04/21 2139 07/06/21  1123   WBC 5.6 5.4     Recent Labs     07/04/21 2139 07/06/21  1123   BUN 10 11   CREATININE 0.7 0.4*     Estimated Creatinine Clearance: 138 mL/min (A) (based on SCr of 0.4 mg/dL (L)). Intake/Output Summary (Last 24 hours) at 7/6/2021 1424  Last data filed at 7/6/2021 1326  Gross per 24 hour   Intake 490 ml   Output 350 ml   Net 140 ml     VANCOMYCIN TROUGH:  No results for input(s): VANCOTROUGH in the last 72 hours. VANCOMYCIN RANDOM:  No results for input(s): VANCORANDOM in the last 72 hours. Assessment:  · WBC WNL/ Afebrile  · Scr trending down  · Day(s) of therapy: 2  · Vancomycin concentration:   · 07/07 - To be collected    Plan:  · Vancomycin 1,250 mg IV Q 18 Hours  · Check Vancomycin trough prior to fourth dose  · Pharmacy will continue to monitor patient and adjust therapy as indicated    VANCOMYCIN CONCENTRATION SCHEDULED FOR 07/07/2021 @ 10 am    Thank you for the consult.   Aretha Ferrell [facility]  7/6/2021 2:24 PM

## 2021-07-06 NOTE — PLAN OF CARE
Nutrition Problem #1: Inadequate oral intake  Intervention: Food and/or Nutrient Delivery: Continue Current Diet, Start Oral Nutrition Supplement  Nutritional Goals: Patient will consume at least 50-75% at meals during stay

## 2021-07-06 NOTE — PROGRESS NOTES
Hospitalist Progress Note      PCP: Faye Miles, APRN - CNP    Date of Admission: 7/4/2021    Hospital Course: \"70 y.o.  female with a history of DVT and PE who presents with progressive swelling of lower limbs and was found to have another DVT. \"    Subjective:   Pt is fatigued. She complains of generalized pain radha arms,tailbones. Medications:  Reviewed    Infusion Medications    sodium chloride       Scheduled Medications    furosemide  20 mg Oral BID    aspirin  81 mg Oral Daily    metoprolol tartrate  50 mg Oral BID    potassium chloride  40 mEq Oral Daily    magnesium oxide  200 mg Oral Daily    sodium chloride flush  5-40 mL Intravenous 2 times per day    famotidine  20 mg Oral BID    albuterol sulfate HFA  2 puff Inhalation 4x daily    And    ipratropium  2 puff Inhalation 4x daily    sodium chloride flush  5-40 mL Intravenous BID    vancomycin  1,250 mg Intravenous Q18H    enoxaparin  1 mg/kg Subcutaneous BID     PRN Meds: HYDROcodone-acetaminophen, albuterol sulfate HFA, sodium chloride flush, sodium chloride, ondansetron **OR** ondansetron, polyethylene glycol, acetaminophen **OR** acetaminophen, potassium chloride **OR** potassium alternative oral replacement **OR** potassium chloride, magnesium sulfate      Intake/Output Summary (Last 24 hours) at 7/6/2021 1421  Last data filed at 7/6/2021 1326  Gross per 24 hour   Intake 490 ml   Output 350 ml   Net 140 ml       Physical Exam Performed:    /68   Pulse 88   Temp 98.2 °F (36.8 °C) (Oral)   Resp 18   Ht 5' 11\" (1.803 m)   Wt 188 lb (85.3 kg)   SpO2 93%   BMI 26.22 kg/m²     General appearance: No apparent distress, appears stated age and cooperative. HEENT: Pupils equal, round, and reactive to light. Conjunctivae/corneas clear. Neck: Supple, with full range of motion. No jugular venous distention. Trachea midline. Respiratory:  Normal respiratory effort.  Clear to auscultation, bilaterally without Rales/Wheezes/Rhonchi. Cardiovascular: Regular rate and rhythm with normal S1/S2 without murmurs, rubs or gallops. Abdomen: Soft, non-tender, non-distended with normal bowel sounds. Musculoskeletal: No clubbing, cyanosis or edema bilaterally. Full range of motion without deformity. Skin: Skin color, texture, turgor normal.  No rashes or lesions. Neurologic:  Neurovascularly intact without any focal sensory/motor deficits. Cranial nerves: II-XII intact, grossly non-focal.  Psychiatric: Alert and oriented, thought content appropriate, normal insight      Labs:   Recent Labs     07/04/21 2139 07/06/21  1123   WBC 5.6 5.4   HGB 12.0* 11.2*   HCT 36.1* 34.6*    321     Recent Labs     07/04/21 2139 07/06/21  1123    139   K 4.5 5.1    103   CO2 25 26   BUN 10 11   CREATININE 0.7 0.4*   CALCIUM 9.3 9.5     Recent Labs     07/04/21 2139 07/06/21  1123   AST 17 17   ALT 9* 8*   BILITOT 0.3 0.7   ALKPHOS 52 50     Recent Labs     07/06/21  1123   INR 0.96     No results for input(s): Christi Belts in the last 72 hours. Urinalysis:      Lab Results   Component Value Date    NITRU NEGATIVE 03/09/2012    WBCUA 1 03/09/2012    BACTERIA OCCASIONAL 03/09/2012    RBCUA <1 03/09/2012    BLOODU NEGATIVE 03/09/2012    SPECGRAV 1.013 03/09/2012       Radiology:  CTA PULMONARY W CONTRAST   Final Result   Motion limited study. Redemonstrated segmental filling defect within left lower lobe pulmonary   artery. Otherwise no gross evidence of pulmonary emboli although evaluation   quite limited due to motion artifact. Normal caliber main pulmonary artery. VL DUP LOWER EXTREMITY VENOUS BILATERAL   Final Result   DVT within the right superficial femoral vein and popliteal vein which may be   acute on chronic. No evidence of DVT within the left lower extremity. Nondiagnostic evaluation below the knees bilaterally due to edema.                  Assessment/Plan:    GARCIA/Maldonado Clemons [address]117

## 2021-07-06 NOTE — PROGRESS NOTES
Comprehensive Nutrition Assessment    Type and Reason for Visit:  Initial, Positive Nutrition Screen (poor intake, weight loss reported)    Nutrition Recommendations/Plan:   Continue diet per order -cardiac at this time   Will offer oral nutrition supplement during stay   Encourage consistent meal intake   Will continue to follow up during stay     Nutrition Assessment:  Admit with leg swelling, recurrent DVT. Currently on cardiac diet. Starting to eat more since admit, has been feeling ill for past week not eating as much. Agreeable to oral nutrition supplement during stay. Moderate nutrition risk at this time. Malnutrition Assessment:  Malnutrition Status: At risk for malnutrition (Comment)    Context:  Acute Illness       Estimated Daily Nutrient Needs:  Energy (kcal):  3972-5133 (25-30 radha/kg); Weight Used for Energy Requirements:  Ideal     Protein (g):  70-84(1-1.2 g/kg); Weight Used for Protein Requirements:  Ideal        Fluid (ml/day):  1800; Method Used for Fluid Requirements:  1 ml/kcal      Nutrition Related Findings:  sitting up in bed eating lunch, ate more than 75% of meal, hx HTN, PAD      Wounds:  None       Current Nutrition Therapies:    ADULT DIET; Regular; Low Fat/Low Chol/High Fiber/2 gm Na    Anthropometric Measures:  · Height: 5' 11\" (180.3 cm)  · Current Body Weight: 188 lb 0.8 oz (85.3 kg)   · Usual Body Weight: 193 lb (87.5 kg) (per patient)     · Ideal Body Weight: 155 lbs; % Ideal Body Weight 121.3 %   · BMI: 26.2  · Adjusted Body Weight:  ; No Adjustment   · BMI Categories: Overweight (BMI 25.0-29. 9)       Nutrition Diagnosis:   · Inadequate oral intake related to other (comment) (reduced appetite in illness) as evidenced by poor intake prior to admission      Nutrition Interventions:   Food and/or Nutrient Delivery:  Continue Current Diet, Start Oral Nutrition Supplement  Nutrition Education/Counseling:  No recommendation at this time   Coordination of Nutrition Care:  Continue to monitor while inpatient    Goals:  Patient will consume at least 50-75% at meals during stay       Nutrition Monitoring and Evaluation:   Behavioral-Environmental Outcomes:  None Identified   Food/Nutrient Intake Outcomes:  Diet Advancement/Tolerance, Food and Nutrient Intake, Supplement Intake  Physical Signs/Symptoms Outcomes:  Biochemical Data, Meal Time Behavior, Skin, Weight     Discharge Planning:     Too soon to determine     Electronically signed by Fifi Santos RD, LD on 7/6/21 at 12:50 PM EDT    Contact: [phone]

## 2021-07-06 NOTE — DISCHARGE INSTR - COC
Continuity of Care Form    Patient Name: Mat Agudelo   :  1940  MRN:  [id number]    Admit date:  2021  Discharge date:  ***    Code Status Order: Full Code   Advance Directives:   [facility] Documentation       Date/Time Healthcare Directive Type of Healthcare Directive Copy in [facility] [address] Agent's Name Healthcare Agent's Phone Number    21 1832  No, patient does not have an advance directive for healthcare treatment -- -- -- -- --            Admitting Physician:  Avtar Mendoza MD  PCP: Zayra Lopez APRN - CNP    Discharging Nurse: [facility] Unit/Room#: 1104/1104-A  Discharging Unit Phone Number: ***    Emergency Contact:   Extended Emergency Contact Information  Primary Emergency Contact: [address] Phone: [phone]  Relation: Child  Secondary Emergency Contact: Kaylan Patel  [facility] [place] [address] [facility], [address], [address] Phone: [phone]  Relation: Other    Past Surgical History:  Past Surgical History:   Procedure Laterality Date     SECTION      COLONOSCOPY      ENDOSCOPY, COLON, DIAGNOSTIC         Immunization History:   Immunization History   Administered Date(s) Administered    Influenza Virus Vaccine 2014    Pneumococcal Polysaccharide (Obcvocvas83) 2014       Active Problems:  Patient Active Problem List   Diagnosis Code    Coumadin toxicity T45.511A    Pulmonary embolism and infarction (HCC) I26.99    DVT (deep vein thrombosis) in pregnancy O22.30    Acute deep vein thrombosis (DVT) of right lower extremity ([facility] [address] I82.401       Isolation/Infection:   Isolation            No Isolation          Patient Infection Status       None to display            Nurse Assessment:  Last Vital Signs: /68   Pulse 88   Temp 98.2 °F (36.8 °C) (Oral)   Resp 18   Ht 5' 11\" (1.803 m)   Wt 188 lb (85.3 kg)   SpO2 93%   BMI 26.22 kg/m²     Last documented pain score (0-10 scale): Pain Level: 8  Last Weight:   Wt Readings from Last 1 Encounters:   21 188 lb (85.3 kg)     Mental Status:  {IP PT MENTAL STATUS::::0}    IV Access:  [address] IV ACCESS:331290714:::0}    Nursing Mobility/ADLs:  Walking   {CHP DME ADLs:387631791:::0}  Transfer  {CHP DME ADLs:408958820:::0}  Bathing  {CHP DME ADLs:685075161:::0}  Dressing  {CHP DME ADLs:025286271:::0}  Toileting  {CHP DME ADLs:415614093:::0}  Feeding  {CHP DME ADLs:266560373:::0}  Med Admin  {CHP DME ADLs:510936143:::0}  Med Delivery   { LILI MED Delivery:399342255:::0}    Wound Care Documentation and Therapy:        Elimination:  Continence: Bowel: {YES / DW:95345}  Bladder: {YES / GP:18612}  Urinary Catheter: {Urinary Catheter:790645351:::0}   Colostomy/Ileostomy/Ileal Conduit: {YES / SX:16376}       Date of Last BM: ***    Intake/Output Summary (Last 24 hours) at 2021 1441  Last data filed at 2021 1326  Gross per 24 hour   Intake 490 ml   Output 350 ml   Net 140 ml     I/O last 3 completed shifts:   In: 10 [I.V.:10]  Out: 350 [Urine:350]    Safety Concerns:     [address] Safety Concerns:337752893:::0}    Impairments/Disabilities:      [address] Impairments/Disabilities:500403444:::0}    Nutrition Therapy:  Current Nutrition Therapy:   [address] Diet List:975850377:::0}    Routes of Feeding: {CHP DME Other Feedings:241900002:::0}  Liquids: {Slp liquid thickness:87842}  Daily Fluid Restriction: {CHP DME Yes amt example:545684592:::0}  Last Modified Barium Swallow with Video (Video Swallowing Test): {Done Not Done HDHY:959551451:::3}    Treatments at the Time of Hospital Discharge:   Respiratory Treatments: ***  Oxygen Therapy:  {Therapy; copd oxygen:66091:::0}  Ventilator:    {[facility] Vent List:886464296:::0}    Rehab Therapies: {THERAPEUTIC INTERVENTION:9307883765}  Weight Bearing Status/Restrictions: {[facility] Weight Bearin:::0}  Other Medical Equipment (for information only, NOT a DME order):  {EQUIPMENT:176170201}  Other Treatments: ***    Patient's personal belongings (please select all that are sent with patient):  {CHP DME Belongings:868328688:::0}    RN SIGNATURE:  {Esignature:271748892:::0}    CASE MANAGEMENT/SOCIAL WORK SECTION    Inpatient Status Date: ***    Readmission Risk Assessment Score:  Readmission Risk              Risk of Unplanned Readmission:  12           Discharging to Facility/ Agency   Name:   Address:  Phone:  Fax:    Dialysis Facility (if applicable)   Name:  Address:  Dialysis Schedule:  Phone:  Fax:    / signature: {Esignature:883077997:::0}    PHYSICIAN SECTION    Prognosis: Good    Condition at Discharge: Stable    Rehab Potential (if transferring to Rehab): Good    Recommended Labs or Other Treatments After Discharge: n/a    Physician Certification: I certify the above information and transfer of Frederick Herrera  is necessary for the continuing treatment of the diagnosis listed and that she requires Home Care for less 30 days.      Update Admission H&P: No change in H&P    PHYSICIAN SIGNATURE:  Electronically signed by Balta Benitez MD on 7/6/21 at 2:42 PM EDT

## 2021-07-07 LAB
APTT: 37.9 SECONDS (ref 25.1–37.1)
APTT: 40.3 SECONDS (ref 25.1–37.1)
DOSE AMOUNT: ABNORMAL
DOSE TIME: ABNORMAL
HCT VFR BLD CALC: 33.8 % (ref 37–47)
HEMOGLOBIN: 10.7 GM/DL (ref 12.5–16)
MCH RBC QN AUTO: 32.8 PG (ref 27–31)
MCHC RBC AUTO-ENTMCNC: 31.7 % (ref 32–36)
MCV RBC AUTO: 103.7 FL (ref 78–100)
PDW BLD-RTO: 15.1 % (ref 11.7–14.9)
PLATELET # BLD: 304 K/CU MM (ref 140–440)
PMV BLD AUTO: 10.6 FL (ref 7.5–11.1)
RBC # BLD: 3.26 M/CU MM (ref 4.2–5.4)
VANCOMYCIN TROUGH: 30.4 UG/ML (ref 10–20)
WBC # BLD: 5.2 K/CU MM (ref 4–10.5)

## 2021-07-07 PROCEDURE — 2580000003 HC RX 258: Performed by: HOSPITALIST

## 2021-07-07 PROCEDURE — 1200000000 HC SEMI PRIVATE

## 2021-07-07 PROCEDURE — 94640 AIRWAY INHALATION TREATMENT: CPT

## 2021-07-07 PROCEDURE — 6360000002 HC RX W HCPCS: Performed by: CLINICAL NURSE SPECIALIST

## 2021-07-07 PROCEDURE — 6370000000 HC RX 637 (ALT 250 FOR IP): Performed by: HOSPITALIST

## 2021-07-07 PROCEDURE — 99232 SBSQ HOSP IP/OBS MODERATE 35: CPT | Performed by: INTERNAL MEDICINE

## 2021-07-07 PROCEDURE — 94761 N-INVAS EAR/PLS OXIMETRY MLT: CPT

## 2021-07-07 PROCEDURE — 85730 THROMBOPLASTIN TIME PARTIAL: CPT

## 2021-07-07 PROCEDURE — 85027 COMPLETE CBC AUTOMATED: CPT

## 2021-07-07 PROCEDURE — 6360000002 HC RX W HCPCS: Performed by: INTERNAL MEDICINE

## 2021-07-07 PROCEDURE — 2580000003 HC RX 258: Performed by: PHYSICIAN ASSISTANT

## 2021-07-07 PROCEDURE — 80202 ASSAY OF VANCOMYCIN: CPT

## 2021-07-07 PROCEDURE — 6370000000 HC RX 637 (ALT 250 FOR IP): Performed by: INTERNAL MEDICINE

## 2021-07-07 PROCEDURE — 2580000003 HC RX 258: Performed by: CLINICAL NURSE SPECIALIST

## 2021-07-07 PROCEDURE — 36415 COLL VENOUS BLD VENIPUNCTURE: CPT

## 2021-07-07 RX ORDER — ALBUTEROL SULFATE 90 UG/1
2 AEROSOL, METERED RESPIRATORY (INHALATION)
Status: DISCONTINUED | OUTPATIENT
Start: 2021-07-07 | End: 2021-07-14 | Stop reason: HOSPADM

## 2021-07-07 RX ORDER — LANOLIN ALCOHOL/MO/W.PET/CERES
3 CREAM (GRAM) TOPICAL NIGHTLY PRN
Status: DISCONTINUED | OUTPATIENT
Start: 2021-07-07 | End: 2021-07-14 | Stop reason: HOSPADM

## 2021-07-07 RX ADMIN — HYDROCODONE BITARTRATE AND ACETAMINOPHEN 1 TABLET: 5; 325 TABLET ORAL at 02:31

## 2021-07-07 RX ADMIN — SODIUM CHLORIDE, PRESERVATIVE FREE 10 ML: 5 INJECTION INTRAVENOUS at 21:06

## 2021-07-07 RX ADMIN — FAMOTIDINE 20 MG: 20 TABLET ORAL at 21:05

## 2021-07-07 RX ADMIN — SODIUM CHLORIDE, PRESERVATIVE FREE 10 ML: 5 INJECTION INTRAVENOUS at 08:07

## 2021-07-07 RX ADMIN — MAGNESIUM OXIDE 400 MG (241.3 MG MAGNESIUM) TABLET 200 MG: TABLET at 21:05

## 2021-07-07 RX ADMIN — HYDROCODONE BITARTRATE AND ACETAMINOPHEN 1 TABLET: 5; 325 TABLET ORAL at 09:44

## 2021-07-07 RX ADMIN — Medication 2 PUFF: at 07:45

## 2021-07-07 RX ADMIN — HYDROCODONE BITARTRATE AND ACETAMINOPHEN 1 TABLET: 5; 325 TABLET ORAL at 21:09

## 2021-07-07 RX ADMIN — ENOXAPARIN SODIUM 80 MG: 80 INJECTION SUBCUTANEOUS at 21:06

## 2021-07-07 RX ADMIN — ALBUTEROL SULFATE 2 PUFF: 90 AEROSOL, METERED RESPIRATORY (INHALATION) at 15:29

## 2021-07-07 RX ADMIN — METOPROLOL TARTRATE 50 MG: 50 TABLET, FILM COATED ORAL at 21:05

## 2021-07-07 RX ADMIN — HYDROCODONE BITARTRATE AND ACETAMINOPHEN 1 TABLET: 5; 325 TABLET ORAL at 15:39

## 2021-07-07 RX ADMIN — ALBUTEROL SULFATE 2 PUFF: 90 AEROSOL, METERED RESPIRATORY (INHALATION) at 07:45

## 2021-07-07 RX ADMIN — VANCOMYCIN HYDROCHLORIDE 1250 MG: 5 INJECTION, POWDER, LYOPHILIZED, FOR SOLUTION INTRAVENOUS at 11:31

## 2021-07-07 RX ADMIN — METOPROLOL TARTRATE 50 MG: 50 TABLET, FILM COATED ORAL at 08:06

## 2021-07-07 RX ADMIN — FAMOTIDINE 20 MG: 20 TABLET ORAL at 08:06

## 2021-07-07 RX ADMIN — ENOXAPARIN SODIUM 80 MG: 80 INJECTION SUBCUTANEOUS at 08:19

## 2021-07-07 RX ADMIN — ASPIRIN 81 MG: 81 TABLET, CHEWABLE ORAL at 08:05

## 2021-07-07 ASSESSMENT — PAIN DESCRIPTION - FREQUENCY
FREQUENCY: CONTINUOUS

## 2021-07-07 ASSESSMENT — PAIN - FUNCTIONAL ASSESSMENT
PAIN_FUNCTIONAL_ASSESSMENT: ACTIVITIES ARE NOT PREVENTED
PAIN_FUNCTIONAL_ASSESSMENT: ACTIVITIES ARE NOT PREVENTED

## 2021-07-07 ASSESSMENT — PAIN DESCRIPTION - PROGRESSION
CLINICAL_PROGRESSION: GRADUALLY WORSENING
CLINICAL_PROGRESSION: NOT CHANGED
CLINICAL_PROGRESSION: GRADUALLY WORSENING

## 2021-07-07 ASSESSMENT — PAIN SCALES - GENERAL
PAINLEVEL_OUTOF10: 0
PAINLEVEL_OUTOF10: 8
PAINLEVEL_OUTOF10: 0
PAINLEVEL_OUTOF10: 9
PAINLEVEL_OUTOF10: 8
PAINLEVEL_OUTOF10: 9
PAINLEVEL_OUTOF10: 8

## 2021-07-07 ASSESSMENT — PAIN DESCRIPTION - LOCATION
LOCATION: LEG
LOCATION: GENERALIZED
LOCATION: LEG

## 2021-07-07 ASSESSMENT — PAIN DESCRIPTION - PAIN TYPE
TYPE: ACUTE PAIN;CHRONIC PAIN
TYPE: ACUTE PAIN
TYPE: ACUTE PAIN
TYPE: ACUTE PAIN;CHRONIC PAIN

## 2021-07-07 ASSESSMENT — PAIN DESCRIPTION - ORIENTATION
ORIENTATION: RIGHT;LEFT
ORIENTATION: LEFT
ORIENTATION: LEFT;RIGHT

## 2021-07-07 ASSESSMENT — PAIN DESCRIPTION - ONSET
ONSET: ON-GOING
ONSET: ON-GOING

## 2021-07-07 ASSESSMENT — PAIN DESCRIPTION - DIRECTION
RADIATING_TOWARDS: DOWN
RADIATING_TOWARDS: DOWN

## 2021-07-07 ASSESSMENT — PAIN DESCRIPTION - DESCRIPTORS
DESCRIPTORS: THROBBING
DESCRIPTORS: ACHING;CONSTANT
DESCRIPTORS: THROBBING
DESCRIPTORS: ACHING

## 2021-07-07 NOTE — RT PROTOCOL NOTE
increased work of breathing using Per Protocol order mode. Repeat RT Therapy Protocol Assessment with second treatment then BID and as needed. If Albuterol Inhaler not tolerated or not effective, then discontinue the Albuterol Inhaler orders and enter two Albuterol Nebulizer orders with same frequencies and PRN reasons. 11-13 - discontinue any other Inpatient aerosolized bronchodilator medication orders and enter DuoNeb Nebulizer orders QID frequency and an Albuterol Nebulizer order every 2 hours PRN wheezing or increased work of breathing using Per Protocol order mode. Repeat RT Therapy Protocol Assessment with second treatment then QID and as needed. Greater than 13 - discontinue any other Inpatient bronchodilator aerosolized medication orders and enter DuoNeb Nebulizer order every 4 hours frequency and Albuterol Nebulizer every 2 hours PRN wheezing or increased work of breathing using Per Protocol order mode. Repeat RT Therapy Protocol Assessment with second treatment then every 4 hours and as needed. RT to enter RT Home Evaluation for COPD & MDI Assessment order using Per Protocol order mode.     Electronically signed by Jaclyn Mcmahon RCP on 7/7/2021 at 2:35 PM

## 2021-07-07 NOTE — PROGRESS NOTES
Hospitalist Progress Note      PCP: Anu Quiñones, APRN - CNP    Date of Admission: 7/4/2021    Hospital Course: \"70 y.o.  female with a history of DVT and PE who presents with progressive swelling of lower limbs and was found to have another DVT. \"    Subjective:   Pt is fatigued. She complains of generalized pain. Medications:  Reviewed    Infusion Medications    sodium chloride       Scheduled Medications    furosemide  20 mg Oral BID    aspirin  81 mg Oral Daily    metoprolol tartrate  50 mg Oral BID    potassium chloride  40 mEq Oral Daily    magnesium oxide  200 mg Oral Daily    sodium chloride flush  5-40 mL Intravenous 2 times per day    famotidine  20 mg Oral BID    albuterol sulfate HFA  2 puff Inhalation 4x daily    And    ipratropium  2 puff Inhalation 4x daily    sodium chloride flush  5-40 mL Intravenous BID    vancomycin  1,250 mg Intravenous Q18H    enoxaparin  1 mg/kg Subcutaneous BID     PRN Meds: HYDROcodone-acetaminophen, albuterol sulfate HFA, sodium chloride flush, sodium chloride, ondansetron **OR** ondansetron, polyethylene glycol, acetaminophen **OR** acetaminophen, potassium chloride **OR** potassium alternative oral replacement **OR** potassium chloride, magnesium sulfate      Intake/Output Summary (Last 24 hours) at 7/7/2021 0816  Last data filed at 7/7/2021 0813  Gross per 24 hour   Intake 720 ml   Output --   Net 720 ml       Physical Exam Performed:    /62   Pulse 84   Temp 98.5 °F (36.9 °C) (Oral)   Resp 14   Ht 5' 11\" (1.803 m)   Wt 188 lb (85.3 kg)   SpO2 96%   BMI 26.22 kg/m²     General appearance: No apparent distress, appears stated age and cooperative. HEENT: Pupils equal, round, and reactive to light. Conjunctivae/corneas clear. Neck: Supple, with full range of motion. No jugular venous distention. Trachea midline. Respiratory:  Normal respiratory effort. Clear to auscultation, bilaterally without Rales/Wheezes/Rhonchi.   Cardiovascular: Regular rate and rhythm with normal S1/S2 without murmurs, rubs or gallops. Abdomen: Soft, non-tender, non-distended with normal bowel sounds. Musculoskeletal: No clubbing, cyanosis or edema bilaterally. Full range of motion without deformity. Skin: Skin color, texture, turgor normal.  No rashes or lesions. Neurologic:  Neurovascularly intact without any focal sensory/motor deficits. Cranial nerves: II-XII intact, grossly non-focal.  Psychiatric: Alert and oriented, thought content appropriate, normal insight      Labs:   Recent Labs     07/04/21 2139 07/06/21  1123   WBC 5.6 5.4   HGB 12.0* 11.2*   HCT 36.1* 34.6*    321     Recent Labs     07/04/21 2139 07/06/21  1123    139   K 4.5 5.1    103   CO2 25 26   BUN 10 11   CREATININE 0.7 0.4*   CALCIUM 9.3 9.5     Recent Labs     07/04/21 2139 07/06/21  1123   AST 17 17   ALT 9* 8*   BILITOT 0.3 0.7   ALKPHOS 52 50     Recent Labs     07/06/21  1123   INR 0.96     No results for input(s): Prasanna Altes in the last 72 hours. Urinalysis:      Lab Results   Component Value Date    NITRU NEGATIVE 03/09/2012    WBCUA 1 03/09/2012    BACTERIA OCCASIONAL 03/09/2012    RBCUA <1 03/09/2012    BLOODU NEGATIVE 03/09/2012    SPECGRAV 1.013 03/09/2012       Radiology:  CTA PULMONARY W CONTRAST   Final Result   Motion limited study. Redemonstrated segmental filling defect within left lower lobe pulmonary   artery. Otherwise no gross evidence of pulmonary emboli although evaluation   quite limited due to motion artifact. Normal caliber main pulmonary artery. VL DUP LOWER EXTREMITY VENOUS BILATERAL   Final Result   DVT within the right superficial femoral vein and popliteal vein which may be   acute on chronic. No evidence of DVT within the left lower extremity. Nondiagnostic evaluation below the knees bilaterally due to edema.                  Assessment/Plan:    Active Hospital Problems    Diagnosis     DVT (deep vein thrombosis) in pregnancy [O22.30]      Hypercoagulable state-patient has had previous DVTs and PE.    DVT within the right superficial femoral vein and popliteal vein reported. Pt had medication failure and not a compliance issue.  -Continue Eliquis and is compliant with her medications. Lovenox shots as an outpatient. - Hematology-oncology consulted. Patient was switched from heparin to lovenox. Plan to switch to pradaxa. Cellulitis-bilateral lower limbs especially on the shins.    -Continue IV vancomycin(day 3/7). Plan to discharge on bactrim. Hypertension  -BP controlled. Continue Lasix and metoprolol. COPD without exacerbation  -continue bronchodilator treatment    Moderate Aortic stenosis  -Pt has been declining lasix. K 5.1,d/c potassium. Insomnia  -Start melatonin    DVT Prophylaxis: lovenox  Diet: ADULT DIET; Regular; Low Fat/Low Chol/High Fiber/2 gm Na  Adult Oral Nutrition Supplement;  Low Calorie/High Protein Oral Supplement  Code Status: Full Code    PT/OT Eval Status: ordered    Dispo - Home when ok with Oncology    Deanna Arce MD

## 2021-07-07 NOTE — PROGRESS NOTES
ONCOLOGY HEMATOLOGY CARE ([facility])  PROGRESS NOTE      Patient was seen and examined today. Not in any acute distress and no overnight events. She is getting better today. She is on lovenox and she is tolerating it well. Her hemoglobin was 11.2. No new complaints. PHYSICAL EXAM    Vitals: BP (!) 123/56   Pulse 80   Temp 98 °F (36.7 °C) (Oral)   Resp 18   Ht 5' 11\" (1.803 m)   Wt 188 lb (85.3 kg)   SpO2 94%   BMI 26.22 kg/m²   CONSTITUTIONAL: awake, alert, cooperative, no apparent distress   EYES: pupils equal, round and reactive to light, sclera clear and conjunctiva normal  ENT: Normocephalic, without obvious abnormality, atraumatic  NECK: supple, symmetrical, no jugular venous distension and no carotid bruits   HEMATOLOGIC/LYMPHATIC: no cervical, supraclavicular or axillary lymphadenopathy   LUNGS: VBS, no wheezes, clear to auscultation, no crackles, no rhonchi, no increased work of breathing  CARDIOVASCULAR: regular rate and rhythm, normal S1 and S2, no murmur noted  ABDOMEN: normal bowel sounds x 4, soft, non-distended, non-tender, no masses palpated, no hepatosplenomgaly   MUSCULOSKELETAL: full range of motion noted, tone is normal  NEUROLOGIC: awake, alert, oriented to name, place and time. Motor skills grossly intact. SKIN: Normal skin color, texture, turgor and no jaundice.  Skin appears intact   EXTREMITIES: Left leg swelling, redness and tenderness +, no cyanosis, no clubbing     LABORATORY RESULTS  CBC:   Recent Labs     07/04/21 2139 07/06/21  1123   WBC 5.6 5.4   HGB 12.0* 11.2*    321     BMP:    Recent Labs     07/04/21 2139 07/06/21  1123    139   K 4.5 5.1    103   CO2 25 26   BUN 10 11   CREATININE 0.7 0.4*   GLUCOSE 90 96     Hepatic:   Recent Labs     07/04/21 2139 07/06/21  1123   AST 17 17   ALT 9* 8*   BILITOT 0.3 0.7   ALKPHOS 52 50     INR:   Recent Labs     07/06/21  1123   INR 0.96     ASSESSMENT  Left lower extremity recurrent DVT     RECOMMENDATION  She was first diagnosed with left LE DVT ~ 2009 or 2010. She has been on coumadin until 8/17/2015 when she presented with supra therapeutic INR. Coumadin was stooped since 8/15/2015. She then had recurrent left LE DVT on 7/13/2016 and she has been on eliquis since then.      Factor V leiden and prothrombin gene mutation done on 817/2015 were negative. Homocysteine was mildly elevated.      Since she failed eliquis, we started lovenox since 7/5/21. After 3 to 4 days of therapy with lovenox, I recommend to switch it to pradaxa 150 mg BID. 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      We will follow the patient.  Thank you

## 2021-07-08 LAB
ANION GAP SERPL CALCULATED.3IONS-SCNC: 10 MMOL/L (ref 4–16)
APTT: 30.3 SECONDS (ref 25.1–37.1)
APTT: 31 SECONDS (ref 25.1–37.1)
APTT: 36.5 SECONDS (ref 25.1–37.1)
APTT: 41.3 SECONDS (ref 25.1–37.1)
BUN BLDV-MCNC: 10 MG/DL (ref 6–23)
CALCIUM SERPL-MCNC: 9.7 MG/DL (ref 8.3–10.6)
CHLORIDE BLD-SCNC: 102 MMOL/L (ref 99–110)
CO2: 28 MMOL/L (ref 21–32)
CREAT SERPL-MCNC: 0.5 MG/DL (ref 0.6–1.1)
DOSE AMOUNT: NORMAL
DOSE TIME: NORMAL
GFR AFRICAN AMERICAN: >60 ML/MIN/1.73M2
GFR NON-AFRICAN AMERICAN: >60 ML/MIN/1.73M2
GLUCOSE BLD-MCNC: 100 MG/DL (ref 70–99)
INR BLD: 0.88 INDEX
POTASSIUM SERPL-SCNC: 4.6 MMOL/L (ref 3.5–5.1)
PROTHROMBIN TIME: 11.4 SECONDS (ref 11.7–14.5)
SODIUM BLD-SCNC: 140 MMOL/L (ref 135–145)
VANCOMYCIN TROUGH: 11.3 UG/ML (ref 10–20)

## 2021-07-08 PROCEDURE — 2580000003 HC RX 258: Performed by: HOSPITALIST

## 2021-07-08 PROCEDURE — 94761 N-INVAS EAR/PLS OXIMETRY MLT: CPT

## 2021-07-08 PROCEDURE — 99232 SBSQ HOSP IP/OBS MODERATE 35: CPT | Performed by: INTERNAL MEDICINE

## 2021-07-08 PROCEDURE — 85610 PROTHROMBIN TIME: CPT

## 2021-07-08 PROCEDURE — 80202 ASSAY OF VANCOMYCIN: CPT

## 2021-07-08 PROCEDURE — 36415 COLL VENOUS BLD VENIPUNCTURE: CPT

## 2021-07-08 PROCEDURE — 85730 THROMBOPLASTIN TIME PARTIAL: CPT

## 2021-07-08 PROCEDURE — 80048 BASIC METABOLIC PNL TOTAL CA: CPT

## 2021-07-08 PROCEDURE — 6360000002 HC RX W HCPCS: Performed by: INTERNAL MEDICINE

## 2021-07-08 PROCEDURE — 1200000000 HC SEMI PRIVATE

## 2021-07-08 PROCEDURE — 6370000000 HC RX 637 (ALT 250 FOR IP): Performed by: INTERNAL MEDICINE

## 2021-07-08 PROCEDURE — 6370000000 HC RX 637 (ALT 250 FOR IP): Performed by: HOSPITALIST

## 2021-07-08 PROCEDURE — 2580000003 HC RX 258: Performed by: CLINICAL NURSE SPECIALIST

## 2021-07-08 PROCEDURE — 2580000003 HC RX 258: Performed by: PHYSICIAN ASSISTANT

## 2021-07-08 PROCEDURE — 6360000002 HC RX W HCPCS: Performed by: CLINICAL NURSE SPECIALIST

## 2021-07-08 RX ORDER — GABAPENTIN 100 MG/1
100 CAPSULE ORAL 3 TIMES DAILY
Status: DISCONTINUED | OUTPATIENT
Start: 2021-07-08 | End: 2021-07-14 | Stop reason: HOSPADM

## 2021-07-08 RX ORDER — SULFAMETHOXAZOLE AND TRIMETHOPRIM 800; 160 MG/1; MG/1
1 TABLET ORAL 2 TIMES DAILY
Qty: 6 TABLET | Refills: 0 | OUTPATIENT
Start: 2021-07-08 | End: 2021-07-11

## 2021-07-08 RX ORDER — WARFARIN SODIUM 2.5 MG/1
5 TABLET ORAL DAILY
Status: DISCONTINUED | OUTPATIENT
Start: 2021-07-08 | End: 2021-07-08

## 2021-07-08 RX ORDER — TRAZODONE HYDROCHLORIDE 50 MG/1
50 TABLET ORAL NIGHTLY
Status: DISCONTINUED | OUTPATIENT
Start: 2021-07-08 | End: 2021-07-14 | Stop reason: HOSPADM

## 2021-07-08 RX ADMIN — FAMOTIDINE 20 MG: 20 TABLET ORAL at 20:54

## 2021-07-08 RX ADMIN — METOPROLOL TARTRATE 50 MG: 50 TABLET, FILM COATED ORAL at 20:53

## 2021-07-08 RX ADMIN — SODIUM CHLORIDE, PRESERVATIVE FREE 10 ML: 5 INJECTION INTRAVENOUS at 20:55

## 2021-07-08 RX ADMIN — ASPIRIN 81 MG: 81 TABLET, CHEWABLE ORAL at 10:46

## 2021-07-08 RX ADMIN — MAGNESIUM OXIDE 400 MG (241.3 MG MAGNESIUM) TABLET 400 MG: TABLET at 10:46

## 2021-07-08 RX ADMIN — HYDROCODONE BITARTRATE AND ACETAMINOPHEN 1 TABLET: 5; 325 TABLET ORAL at 20:52

## 2021-07-08 RX ADMIN — METOPROLOL TARTRATE 50 MG: 50 TABLET, FILM COATED ORAL at 10:46

## 2021-07-08 RX ADMIN — GABAPENTIN 100 MG: 100 CAPSULE ORAL at 20:54

## 2021-07-08 RX ADMIN — TRAZODONE HYDROCHLORIDE 50 MG: 50 TABLET ORAL at 20:54

## 2021-07-08 RX ADMIN — HYDROCODONE BITARTRATE AND ACETAMINOPHEN 1 TABLET: 5; 325 TABLET ORAL at 10:45

## 2021-07-08 RX ADMIN — SODIUM CHLORIDE, PRESERVATIVE FREE 10 ML: 5 INJECTION INTRAVENOUS at 10:48

## 2021-07-08 RX ADMIN — SODIUM CHLORIDE, PRESERVATIVE FREE 10 ML: 5 INJECTION INTRAVENOUS at 10:52

## 2021-07-08 RX ADMIN — ENOXAPARIN SODIUM 80 MG: 80 INJECTION SUBCUTANEOUS at 20:54

## 2021-07-08 RX ADMIN — VANCOMYCIN HYDROCHLORIDE 1250 MG: 5 INJECTION, POWDER, LYOPHILIZED, FOR SOLUTION INTRAVENOUS at 10:45

## 2021-07-08 RX ADMIN — HYDROCODONE BITARTRATE AND ACETAMINOPHEN 1 TABLET: 5; 325 TABLET ORAL at 03:41

## 2021-07-08 RX ADMIN — FAMOTIDINE 20 MG: 20 TABLET ORAL at 10:45

## 2021-07-08 RX ADMIN — MAGNESIUM OXIDE 400 MG (241.3 MG MAGNESIUM) TABLET 200 MG: TABLET at 20:54

## 2021-07-08 RX ADMIN — ENOXAPARIN SODIUM 80 MG: 80 INJECTION SUBCUTANEOUS at 10:45

## 2021-07-08 ASSESSMENT — PAIN DESCRIPTION - PAIN TYPE
TYPE: ACUTE PAIN;CHRONIC PAIN
TYPE: ACUTE PAIN;CHRONIC PAIN

## 2021-07-08 ASSESSMENT — PAIN SCALES - GENERAL
PAINLEVEL_OUTOF10: 10
PAINLEVEL_OUTOF10: 9
PAINLEVEL_OUTOF10: 9

## 2021-07-08 ASSESSMENT — PAIN - FUNCTIONAL ASSESSMENT: PAIN_FUNCTIONAL_ASSESSMENT: ACTIVITIES ARE NOT PREVENTED

## 2021-07-08 ASSESSMENT — PAIN DESCRIPTION - LOCATION
LOCATION: LEG
LOCATION: GENERALIZED

## 2021-07-08 ASSESSMENT — PAIN DESCRIPTION - PROGRESSION
CLINICAL_PROGRESSION: GRADUALLY WORSENING

## 2021-07-08 ASSESSMENT — PAIN DESCRIPTION - FREQUENCY
FREQUENCY: CONTINUOUS
FREQUENCY: CONTINUOUS

## 2021-07-08 ASSESSMENT — PAIN DESCRIPTION - ONSET: ONSET: ON-GOING

## 2021-07-08 ASSESSMENT — PAIN DESCRIPTION - ORIENTATION: ORIENTATION: RIGHT;LEFT

## 2021-07-08 ASSESSMENT — PAIN DESCRIPTION - DESCRIPTORS
DESCRIPTORS: ACHING
DESCRIPTORS: ACHING

## 2021-07-08 NOTE — CARE COORDINATION
CM was asked by Dr. Ricci Hector to check coverage for Pradaxa for pt through insurance. CM called [facility] pharmacy and spoke with Vini Mcgarry who stated that pt does not have any coverage an no option for a prior auth. Pt cost would be $374 per month. Message left with Dr. Ricci Hector with update.

## 2021-07-08 NOTE — PROGRESS NOTES
Hospitalist Progress Note      PCP: Mele Ruiz, APRN - CNP    Date of Admission: 7/4/2021    Hospital Course: \"70 y.o.  female with a history of DVT and PE who presents with progressive swelling of lower limbs and was found to have another DVT. \"    Subjective:   She complains of generalized pain radha. In left leg    Medications:  Reviewed    Infusion Medications    sodium chloride       Scheduled Medications    furosemide  20 mg Oral BID    aspirin  81 mg Oral Daily    metoprolol tartrate  50 mg Oral BID    magnesium oxide  200 mg Oral Daily    sodium chloride flush  5-40 mL Intravenous 2 times per day    famotidine  20 mg Oral BID    sodium chloride flush  5-40 mL Intravenous BID    vancomycin  1,250 mg Intravenous Q18H    enoxaparin  1 mg/kg Subcutaneous BID     PRN Meds: melatonin, albuterol sulfate HFA, HYDROcodone-acetaminophen, sodium chloride flush, sodium chloride, ondansetron **OR** ondansetron, polyethylene glycol, acetaminophen **OR** acetaminophen, potassium chloride **OR** potassium alternative oral replacement **OR** potassium chloride, magnesium sulfate    No intake or output data in the 24 hours ending 07/08/21 1255    Physical Exam Performed:    BP (!) 127/59   Pulse 80   Temp 98.1 °F (36.7 °C) (Oral)   Resp 18   Ht 5' 11\" (1.803 m)   Wt 188 lb (85.3 kg)   SpO2 94%   BMI 26.22 kg/m²     General appearance: No apparent distress, appears stated age and cooperative. HEENT: Pupils equal, round, and reactive to light. Conjunctivae/corneas clear. Neck: Supple, with full range of motion. No jugular venous distention. Trachea midline. Respiratory:  Normal respiratory effort. Clear to auscultation, bilaterally without Rales/Wheezes/Rhonchi. Cardiovascular: Regular rate and rhythm with normal S1/S2 without murmurs, rubs or gallops. Abdomen: Soft, non-tender, non-distended with normal bowel sounds. Musculoskeletal: No clubbing, cyanosis or edema bilaterally.   Full range of motion without deformity. Skin: Skin color, texture, turgor normal.  No rashes or lesions. Neurologic:  Neurovascularly intact without any focal sensory/motor deficits. Cranial nerves: II-XII intact, grossly non-focal.  Psychiatric: Alert and oriented, thought content appropriate, normal insight      Labs:   Recent Labs     07/06/21  1123 07/07/21  1116   WBC 5.4 5.2   HGB 11.2* 10.7*   HCT 34.6* 33.8*    304     Recent Labs     07/06/21  1123 07/08/21  0908    140   K 5.1 4.6    102   CO2 26 28   BUN 11 10   CREATININE 0.4* 0.5*   CALCIUM 9.5 9.7     Recent Labs     07/06/21  1123   AST 17   ALT 8*   BILITOT 0.7   ALKPHOS 50     Recent Labs     07/06/21  1123   INR 0.96     No results for input(s): [place] in the last 72 hours. Urinalysis:      Lab Results   Component Value Date    NITRU NEGATIVE 03/09/2012    WBCUA 1 03/09/2012    BACTERIA OCCASIONAL 03/09/2012    RBCUA <1 03/09/2012    BLOODU NEGATIVE 03/09/2012    SPECGRAV 1.013 03/09/2012       Radiology:  CTA PULMONARY W CONTRAST   Final Result   Motion limited study. Redemonstrated segmental filling defect within left lower lobe pulmonary   artery. Otherwise no gross evidence of pulmonary emboli although evaluation   quite limited due to motion artifact. Normal caliber main pulmonary artery. VL DUP LOWER EXTREMITY VENOUS BILATERAL   Final Result   DVT within the right superficial femoral vein and popliteal vein which may be   acute on chronic. No evidence of DVT within the left lower extremity. Nondiagnostic evaluation below the knees bilaterally due to edema. Assessment/Plan:    Active Hospital Problems    Diagnosis     DVT (deep vein thrombosis) in pregnancy [O22.30]      Hypercoagulable state-patient has had previous DVTs and PE.    DVT within the right superficial femoral vein and popliteal vein reported. Pt had medication failure and not a compliance issue.  -Continue Eliquis and is compliant with her medications. Lovenox shots as an outpatient. - Hematology-oncology consulted. Patient was switched from heparin to lovenox. Plan to switch to pradaxa.  -Start gabapentin for pain in the leg    Cellulitis-bilateral lower limbs especially on the shins.    -Continue IV vancomycin(day 4/7). Plan to discharge on bactrim. Hypertension  -BP controlled. Continue Lasix and metoprolol. COPD without exacerbation  -continue bronchodilator treatment    Moderate Aortic stenosis  -Pt has been declining lasix. .    Insomnia  -She doesn't tolerate melatonin. Start trazadone. DVT Prophylaxis: lovenox  Diet: ADULT DIET; Regular; Low Fat/Low Chol/High Fiber/2 gm Na  Adult Oral Nutrition Supplement;  Low Calorie/High Protein Oral Supplement  Code Status: Full Code    PT/OT Eval Status: ordered    Dispo - Home with home health when ok with Oncology    Yesenia Ramírez MD

## 2021-07-08 NOTE — PROGRESS NOTES
Pt refuses to take lasix, states she had 2 BM'S , UP WITH WALKER, STILL C/O PAIN IN BOTH LEGS, ESPECIALLY THE LEFT

## 2021-07-08 NOTE — PROGRESS NOTES
[address] [facility]  consulted by Dr. Darek Larsen for monitoring and adjustment. Indication for treatment: Cellulitis  Goal trough: 10 - 15 mcg/mL    Pertinent Laboratory Values:   Temp Readings from Last 3 Encounters:   07/08/21 98.1 °F (36.7 °C) (Oral)   02/10/21 98.2 °F (36.8 °C)   06/18/19 98.6 °F (37 °C) (Oral)     Recent Labs     07/06/21  1123 07/07/21  1116   WBC 5.4 5.2     Recent Labs     07/06/21  1123 07/08/21  0908   BUN 11 10   CREATININE 0.4* 0.5*     Estimated Creatinine Clearance: 110 mL/min (A) (based on SCr of 0.5 mg/dL (L)). No intake or output data in the 24 hours ending 07/08/21 1105  VANCOMYCIN TROUGH:    Recent Labs     07/07/21  1235 07/08/21  0520   VANCOTROUGH 30.4* 11.3     VANCOMYCIN RANDOM:  No results for input(s): VANCORANDOM in the last 72 hours. Assessment:  · WBC WNL/ Afebrile  · Scr trending down  · Day(s) of therapy: 2  · Vancomycin concentration:   · 07/07 - 30.4 - drawn while infusing   · 07/08 - 11.3, therapeutic on 1250 mg q18h     Plan:  · Continue ancomycin 1,250 mg q18h with therapeutic level   · Pharmacy will continue to monitor patient and adjust therapy as indicated    VANCOMYCIN CONCENTRATION SCHEDULED FOR 7/12 AM     Thank you for the consult.   Neri Amaya, [address] [facility]  7/8/2021 11:05 AM

## 2021-07-08 NOTE — PROGRESS NOTES
ONCOLOGY HEMATOLOGY CARE ([facility])  PROGRESS NOTE      Patient was seen and examined today. Not in any acute distress and no overnight events. She is getting better today. She is on lovenox since 7/5/21 and she is tolerating it well. Her hemoglobin was 10.7 this morning. No new complaints. PHYSICAL EXAM    Vitals: BP (!) 112/56   Pulse 84   Temp 97.6 °F (36.4 °C) (Oral)   Resp 16   Ht 5' 11\" (1.803 m)   Wt 188 lb (85.3 kg)   SpO2 95%   BMI 26.22 kg/m²   CONSTITUTIONAL: awake, alert, cooperative, no apparent distress   EYES: pupils equal, round and reactive to light, sclera clear and conjunctiva normal  ENT: Normocephalic, without obvious abnormality, atraumatic  NECK: supple, symmetrical, no jugular venous distension and no carotid bruits   HEMATOLOGIC/LYMPHATIC: no cervical, supraclavicular or axillary lymphadenopathy   LUNGS: VBS, no crackles, no rhonchi, no increased work of breathing, no wheezes, clear to auscultation,   CARDIOVASCULAR: regular rate and rhythm, normal S1 and S2, no murmur noted  ABDOMEN: normal bowel sounds x 4, soft, non-distended, non-tender, no masses palpated, no hepatosplenomgaly   MUSCULOSKELETAL: full range of motion noted, tone is normal  NEUROLOGIC: awake, alert, oriented to name, place and time. Motor skills grossly intact. SKIN: Normal skin color, texture, turgor and no jaundice.  Skin appears intact   EXTREMITIES: redness and tenderness +, no cyanosis, Left leg swelling, no clubbing     LABORATORY RESULTS  CBC:   Recent Labs     07/06/21  1123 07/07/21  1116   WBC 5.4 5.2   HGB 11.2* 10.7*    304     BMP:    Recent Labs     07/06/21  1123      K 5.1      CO2 26   BUN 11   CREATININE 0.4*   GLUCOSE 96     Hepatic:   Recent Labs     07/06/21  1123   AST 17   ALT 8*   BILITOT 0.7   ALKPHOS 50     INR:   Recent Labs     07/06/21  1123   INR 0.96     ASSESSMENT  Left lower extremity recurrent DVT     RECOMMENDATION  She was first diagnosed with left LE DVT ~ 2009 or 2010. She has been on coumadin until 8/17/2015 when she presented with supra therapeutic INR. Coumadin was stooped since 8/15/2015. She then had recurrent left LE DVT on 7/13/2016 and she has been on eliquis since then.      Factor V leiden and prothrombin gene mutation done on 817/2015 were negative. Homocysteine was mildly elevated.      Since she failed eliquis, we started lovenox since 7/5/21. After 3 to 4 days of therapy with lovenox, I recommend to switch it to pradaxa 150 mg BID. Will plan to discharge her home with pradaxa on 7/5/21. I recommend to check with her insurance and get prior authorization for pradaxa if needed. 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      We will follow the patient.  Thank you

## 2021-07-09 LAB
ANION GAP SERPL CALCULATED.3IONS-SCNC: 11 MMOL/L (ref 4–16)
BUN BLDV-MCNC: 10 MG/DL (ref 6–23)
CALCIUM SERPL-MCNC: 9.6 MG/DL (ref 8.3–10.6)
CHLORIDE BLD-SCNC: 101 MMOL/L (ref 99–110)
CO2: 27 MMOL/L (ref 21–32)
CREAT SERPL-MCNC: 0.5 MG/DL (ref 0.6–1.1)
GFR AFRICAN AMERICAN: >60 ML/MIN/1.73M2
GFR NON-AFRICAN AMERICAN: >60 ML/MIN/1.73M2
GLUCOSE BLD-MCNC: 86 MG/DL (ref 70–99)
HCT VFR BLD CALC: 38.8 % (ref 37–47)
HEMOGLOBIN: 12 GM/DL (ref 12.5–16)
INR BLD: 0.88 INDEX
MCH RBC QN AUTO: 32.8 PG (ref 27–31)
MCHC RBC AUTO-ENTMCNC: 30.9 % (ref 32–36)
MCV RBC AUTO: 106 FL (ref 78–100)
PDW BLD-RTO: 14.8 % (ref 11.7–14.9)
PLATELET # BLD: 291 K/CU MM (ref 140–440)
PMV BLD AUTO: 10.6 FL (ref 7.5–11.1)
POTASSIUM SERPL-SCNC: 4.8 MMOL/L (ref 3.5–5.1)
PROTHROMBIN TIME: 11.4 SECONDS (ref 11.7–14.5)
RBC # BLD: 3.66 M/CU MM (ref 4.2–5.4)
SODIUM BLD-SCNC: 139 MMOL/L (ref 135–145)
WBC # BLD: 4.3 K/CU MM (ref 4–10.5)

## 2021-07-09 PROCEDURE — 1200000000 HC SEMI PRIVATE

## 2021-07-09 PROCEDURE — 85730 THROMBOPLASTIN TIME PARTIAL: CPT

## 2021-07-09 PROCEDURE — 6360000002 HC RX W HCPCS: Performed by: CLINICAL NURSE SPECIALIST

## 2021-07-09 PROCEDURE — 2580000003 HC RX 258: Performed by: CLINICAL NURSE SPECIALIST

## 2021-07-09 PROCEDURE — 6370000000 HC RX 637 (ALT 250 FOR IP): Performed by: INTERNAL MEDICINE

## 2021-07-09 PROCEDURE — 2580000003 HC RX 258: Performed by: PHYSICIAN ASSISTANT

## 2021-07-09 PROCEDURE — 94761 N-INVAS EAR/PLS OXIMETRY MLT: CPT

## 2021-07-09 PROCEDURE — 2580000003 HC RX 258: Performed by: HOSPITALIST

## 2021-07-09 PROCEDURE — 36415 COLL VENOUS BLD VENIPUNCTURE: CPT

## 2021-07-09 PROCEDURE — 85027 COMPLETE CBC AUTOMATED: CPT

## 2021-07-09 PROCEDURE — 6370000000 HC RX 637 (ALT 250 FOR IP): Performed by: HOSPITALIST

## 2021-07-09 PROCEDURE — 6360000002 HC RX W HCPCS: Performed by: HOSPITALIST

## 2021-07-09 PROCEDURE — 85610 PROTHROMBIN TIME: CPT

## 2021-07-09 PROCEDURE — 80048 BASIC METABOLIC PNL TOTAL CA: CPT

## 2021-07-09 PROCEDURE — 99232 SBSQ HOSP IP/OBS MODERATE 35: CPT | Performed by: INTERNAL MEDICINE

## 2021-07-09 PROCEDURE — 6360000002 HC RX W HCPCS: Performed by: INTERNAL MEDICINE

## 2021-07-09 RX ORDER — WARFARIN SODIUM 2.5 MG/1
5 TABLET ORAL DAILY
Status: DISCONTINUED | OUTPATIENT
Start: 2021-07-09 | End: 2021-07-14 | Stop reason: HOSPADM

## 2021-07-09 RX ADMIN — TRAZODONE HYDROCHLORIDE 50 MG: 50 TABLET ORAL at 20:28

## 2021-07-09 RX ADMIN — SODIUM CHLORIDE, PRESERVATIVE FREE 10 ML: 5 INJECTION INTRAVENOUS at 09:59

## 2021-07-09 RX ADMIN — SODIUM CHLORIDE 25 ML: 9 INJECTION, SOLUTION INTRAVENOUS at 00:50

## 2021-07-09 RX ADMIN — METOPROLOL TARTRATE 25 MG: 25 TABLET, FILM COATED ORAL at 20:28

## 2021-07-09 RX ADMIN — VANCOMYCIN HYDROCHLORIDE 1250 MG: 5 INJECTION, POWDER, LYOPHILIZED, FOR SOLUTION INTRAVENOUS at 00:50

## 2021-07-09 RX ADMIN — GABAPENTIN 100 MG: 100 CAPSULE ORAL at 20:27

## 2021-07-09 RX ADMIN — FAMOTIDINE 20 MG: 20 TABLET ORAL at 09:57

## 2021-07-09 RX ADMIN — GABAPENTIN 100 MG: 100 CAPSULE ORAL at 13:46

## 2021-07-09 RX ADMIN — GABAPENTIN 100 MG: 100 CAPSULE ORAL at 09:58

## 2021-07-09 RX ADMIN — ENOXAPARIN SODIUM 80 MG: 80 INJECTION SUBCUTANEOUS at 09:56

## 2021-07-09 RX ADMIN — ONDANSETRON 4 MG: 2 INJECTION INTRAMUSCULAR; INTRAVENOUS at 04:54

## 2021-07-09 RX ADMIN — ENOXAPARIN SODIUM 80 MG: 80 INJECTION SUBCUTANEOUS at 20:27

## 2021-07-09 RX ADMIN — VANCOMYCIN HYDROCHLORIDE 1250 MG: 5 INJECTION, POWDER, LYOPHILIZED, FOR SOLUTION INTRAVENOUS at 18:10

## 2021-07-09 RX ADMIN — SODIUM CHLORIDE, PRESERVATIVE FREE 10 ML: 5 INJECTION INTRAVENOUS at 10:00

## 2021-07-09 RX ADMIN — ASPIRIN 81 MG: 81 TABLET, CHEWABLE ORAL at 09:56

## 2021-07-09 RX ADMIN — MAGNESIUM OXIDE 400 MG (241.3 MG MAGNESIUM) TABLET 200 MG: TABLET at 20:28

## 2021-07-09 RX ADMIN — SODIUM CHLORIDE, PRESERVATIVE FREE 10 ML: 5 INJECTION INTRAVENOUS at 20:27

## 2021-07-09 RX ADMIN — SODIUM CHLORIDE, PRESERVATIVE FREE 10 ML: 5 INJECTION INTRAVENOUS at 20:29

## 2021-07-09 RX ADMIN — HYDROCODONE BITARTRATE AND ACETAMINOPHEN 1 TABLET: 5; 325 TABLET ORAL at 20:28

## 2021-07-09 RX ADMIN — HYDROCODONE BITARTRATE AND ACETAMINOPHEN 1 TABLET: 5; 325 TABLET ORAL at 04:54

## 2021-07-09 RX ADMIN — WARFARIN SODIUM 5 MG: 2.5 TABLET ORAL at 13:47

## 2021-07-09 RX ADMIN — FAMOTIDINE 20 MG: 20 TABLET ORAL at 20:27

## 2021-07-09 RX ADMIN — HYDROCODONE BITARTRATE AND ACETAMINOPHEN 1 TABLET: 5; 325 TABLET ORAL at 13:52

## 2021-07-09 ASSESSMENT — PAIN DESCRIPTION - FREQUENCY
FREQUENCY: CONTINUOUS
FREQUENCY: CONTINUOUS

## 2021-07-09 ASSESSMENT — PAIN DESCRIPTION - DESCRIPTORS
DESCRIPTORS: ACHING
DESCRIPTORS: ACHING

## 2021-07-09 ASSESSMENT — PAIN DESCRIPTION - ORIENTATION
ORIENTATION: RIGHT;LEFT
ORIENTATION: RIGHT;LEFT

## 2021-07-09 ASSESSMENT — PAIN DESCRIPTION - LOCATION
LOCATION: LEG
LOCATION: LEG

## 2021-07-09 ASSESSMENT — PAIN - FUNCTIONAL ASSESSMENT
PAIN_FUNCTIONAL_ASSESSMENT: PREVENTS OR INTERFERES SOME ACTIVE ACTIVITIES AND ADLS
PAIN_FUNCTIONAL_ASSESSMENT: ACTIVITIES ARE NOT PREVENTED

## 2021-07-09 ASSESSMENT — PAIN DESCRIPTION - ONSET
ONSET: ON-GOING
ONSET: ON-GOING

## 2021-07-09 ASSESSMENT — PAIN DESCRIPTION - PROGRESSION
CLINICAL_PROGRESSION: GRADUALLY WORSENING
CLINICAL_PROGRESSION: GRADUALLY WORSENING

## 2021-07-09 ASSESSMENT — PAIN SCALES - GENERAL
PAINLEVEL_OUTOF10: 9
PAINLEVEL_OUTOF10: 0
PAINLEVEL_OUTOF10: 10
PAINLEVEL_OUTOF10: 9

## 2021-07-09 ASSESSMENT — PAIN DESCRIPTION - PAIN TYPE
TYPE: ACUTE PAIN;CHRONIC PAIN
TYPE: ACUTE PAIN;CHRONIC PAIN

## 2021-07-09 NOTE — PROGRESS NOTES
[address] [facility]  consulted by Dr. Cat Waldrop for monitoring and adjustment. Indication for treatment: Cellulitis  Goal trough: 10 - 15 mcg/mL, AUC <500     Pertinent Laboratory Values:   Temp Readings from Last 3 Encounters:   07/09/21 97.8 °F (36.6 °C) (Oral)   02/10/21 98.2 °F (36.8 °C)   06/18/19 98.6 °F (37 °C) (Oral)     Recent Labs     07/07/21  1116 07/09/21  0730   WBC 5.2 4.3     Recent Labs     07/08/21  0908 07/09/21  0730   BUN 10 10   CREATININE 0.5* 0.5*     Estimated Creatinine Clearance: 110 mL/min (A) (based on SCr of 0.5 mg/dL (L)). Intake/Output Summary (Last 24 hours) at 7/9/2021 1348  Last data filed at 7/9/2021 0328  Gross per 24 hour   Intake 280 ml   Output --   Net 280 ml     VANCOMYCIN TROUGH:    Recent Labs     07/07/21  1235 07/08/21  0520   VANCOTROUGH 30.4* 11.3     VANCOMYCIN RANDOM:  No results for input(s): VANCORANDOM in the last 72 hours. Assessment:  · WBC WNL/ Afebrile  · Scr trending down  · Day(s) of therapy: 5  · Vancomycin concentration:   · 07/07 - 30.4 - drawn while infusing   · 07/08 - 11.3, therapeutic on 1250 mg q18h     Plan:  · Continue vancomycin 1,250 mg q18h with therapeutic level   · Pharmacy will continue to monitor patient and adjust therapy as indicated    [address] 7/12 AM     Thank you for the consult.   Aris Faria, [facility]  7/9/2021 1:48 PM

## 2021-07-09 NOTE — PLAN OF CARE
Problem: Pain:  Goal: Pain level will decrease  Description: Pain level will decrease  7/9/2021 0129 by Goran Shirley RN  Outcome: Ongoing  7/9/2021 0128 by Goran Shirley RN  Outcome: Ongoing  Goal: Control of acute pain  Description: Control of acute pain  7/9/2021 0129 by Goran Shirley RN  Outcome: Ongoing  7/9/2021 0128 by Goran Shirley RN  Outcome: Ongoing  Goal: Control of chronic pain  Description: Control of chronic pain  7/9/2021 0129 by Goran Shirley RN  Outcome: Ongoing  7/9/2021 0128 by Goran Shirley RN  Outcome: Ongoing     Problem: Nutrition  Goal: Optimal nutrition therapy  7/9/2021 0129 by Goran Shirley RN  Outcome: Ongoing  7/9/2021 0128 by Goran Shirley RN  Outcome: Ongoing     Problem: Falls - Risk of:  Goal: Will remain free from falls  Description: Will remain free from falls  7/9/2021 0129 by Goran Shirley RN  Outcome: Ongoing  7/9/2021 0128 by Goran Shirley RN  Outcome: Ongoing  Goal: Absence of physical injury  Description: Absence of physical injury  7/9/2021 0129 by Goran Shirley RN  Outcome: Ongoing  7/9/2021 0128 by Goran Shirley RN  Outcome: Ongoing     Problem: Safety:  Goal: Free from accidental physical injury  Description: Free from accidental physical injury  Outcome: Ongoing  Goal: Free from intentional harm  Description: Free from intentional harm  Outcome: Ongoing     Problem: Daily Care:  Goal: Daily care needs are met  Description: Daily care needs are met  Outcome: Ongoing     Problem: Discharge Planning:  Goal: Patients continuum of care needs are met  Description: Patients continuum of care needs are met  Outcome: Ongoing

## 2021-07-09 NOTE — PROGRESS NOTES
Hospitalist Progress Note      PCP: La Tucker, APRN - CNP    Date of Admission: 7/4/2021    Hospital Course: \"70 y.o.  female with a history of DVT and PE who presents with progressive swelling of lower limbs and was found to have another DVT. \"    Subjective:   She complaints of interuppted sleep. No n/v.    Medications:  Reviewed    Infusion Medications    sodium chloride Stopped (07/09/21 0328)     Scheduled Medications    warfarin  5 mg Oral Daily    gabapentin  100 mg Oral TID    traZODone  50 mg Oral Nightly    furosemide  20 mg Oral BID    aspirin  81 mg Oral Daily    metoprolol tartrate  50 mg Oral BID    magnesium oxide  200 mg Oral Daily    sodium chloride flush  5-40 mL Intravenous 2 times per day    famotidine  20 mg Oral BID    sodium chloride flush  5-40 mL Intravenous BID    vancomycin  1,250 mg Intravenous Q18H    enoxaparin  1 mg/kg Subcutaneous BID     PRN Meds: melatonin, albuterol sulfate HFA, HYDROcodone-acetaminophen, sodium chloride flush, sodium chloride, ondansetron **OR** ondansetron, polyethylene glycol, acetaminophen **OR** acetaminophen, potassium chloride **OR** potassium alternative oral replacement **OR** potassium chloride, magnesium sulfate      Intake/Output Summary (Last 24 hours) at 7/9/2021 0824  Last data filed at 7/9/2021 0328  Gross per 24 hour   Intake 280 ml   Output --   Net 280 ml       Physical Exam Performed:    BP (!) 143/65   Pulse 78   Temp 98 °F (36.7 °C) (Oral)   Resp 16   Ht 5' 11\" (1.803 m)   Wt 188 lb (85.3 kg)   SpO2 95%   BMI 26.22 kg/m²     General appearance: No apparent distress, appears stated age and cooperative. HEENT: Pupils equal, round, and reactive to light. Conjunctivae/corneas clear. Neck: Supple, with full range of motion. No jugular venous distention. Trachea midline. Respiratory:  Normal respiratory effort. Clear to auscultation, bilaterally without Rales/Wheezes/Rhonchi.   Cardiovascular: Regular rate and rhythm with normal S1/S2 without murmurs, rubs or gallops. Abdomen: Soft, non-tender, non-distended with normal bowel sounds. Musculoskeletal: No clubbing, cyanosis or edema bilaterally. Full range of motion without deformity. Skin: Skin color, texture, turgor normal.  No rashes or lesions. Neurologic:  Neurovascularly intact without any focal sensory/motor deficits. Cranial nerves: II-XII intact, grossly non-focal.  Psychiatric: Alert and oriented, thought content appropriate, normal insight      Labs:   Recent Labs     07/06/21  1123 07/07/21  1116 07/09/21  0730   WBC 5.4 5.2 4.3   HGB 11.2* 10.7* 12.0*   HCT 34.6* 33.8* 38.8    304 291     Recent Labs     07/06/21  1123 07/08/21  0908    140   K 5.1 4.6    102   CO2 26 28   BUN 11 10   CREATININE 0.4* 0.5*   CALCIUM 9.5 9.7     Recent Labs     07/06/21  1123   AST 17   ALT 8*   BILITOT 0.7   ALKPHOS 50     Recent Labs     07/06/21  1123 07/08/21  1939   INR 0.96 0.88     No results for input(s): Jaleesa Pool in the last 72 hours. Urinalysis:      Lab Results   Component Value Date    NITRU NEGATIVE 03/09/2012    WBCUA 1 03/09/2012    BACTERIA OCCASIONAL 03/09/2012    RBCUA <1 03/09/2012    BLOODU NEGATIVE 03/09/2012    SPECGRAV 1.013 03/09/2012       Radiology:  CTA PULMONARY W CONTRAST   Final Result   Motion limited study. Redemonstrated segmental filling defect within left lower lobe pulmonary   artery. Otherwise no gross evidence of pulmonary emboli although evaluation   quite limited due to motion artifact. Normal caliber main pulmonary artery. VL DUP LOWER EXTREMITY VENOUS BILATERAL   Final Result   DVT within the right superficial femoral vein and popliteal vein which may be   acute on chronic. No evidence of DVT within the left lower extremity. Nondiagnostic evaluation below the knees bilaterally due to edema.                  Assessment/Plan:    Active Hospital Problems    Diagnosis     DVT (deep vein thrombosis) in pregnancy [O22.30]      Hypercoagulable state-patient has had previous DVTs and PE.    DVT within the right superficial femoral vein and popliteal vein reported. Pt had medication failure and not a compliance issue.  -Continue Eliquis and is compliant with her medications. Lovenox shots as an outpatient. - Hematology-oncology consulted. Patient was switched from heparin to lovenox. Plan to switch to pradaxa. Cost is $374/month per pharmacy. Patient was switched to coumadin.  -Continue gabapentin for pain in the leg    Cellulitis-bilateral lower limbs especially on the shins.    -Continue IV vancomycin(day 5/7). Plan to discharge on bactrim. Hypertension  -BP controlled. Continue Lasix and metoprolol. COPD without exacerbation  -continue bronchodilator treatment    Moderate Aortic stenosis  -Pt has been declining lasix. .    Insomnia  -She doesn't tolerate melatonin. Continue trazadone. DVT Prophylaxis: lovenox  Diet: ADULT DIET; Regular; Low Fat/Low Chol/High Fiber/2 gm Na  Adult Oral Nutrition Supplement; Low Calorie/High Protein Oral Supplement  Code Status: Full Code    PT/OT Eval Status: ordered    Dispo - Home with home health when ok with Oncology. ? Home with lovenox bridge or wait for INR to be therapeutic. ?    Yesenia Ramírez MD

## 2021-07-09 NOTE — PROGRESS NOTES
ONCOLOGY HEMATOLOGY CARE ([facility])  PROGRESS NOTE      Patient was seen and examined today. Not in any acute distress and no overnight events. She is getting better today. She is on lovenox since 7/5/21 and she is tolerating it well. Her hemoglobin was 10.7 on 7/7/21. Her insurance doesn't cover pradaxa. Will start coumadin if she agrees. No new complaints. PHYSICAL EXAM    Vitals: /66   Pulse 85   Temp 98.2 °F (36.8 °C) (Oral)   Resp 16   Ht 5' 11\" (1.803 m)   Wt 188 lb (85.3 kg)   SpO2 95%   BMI 26.22 kg/m²   CONSTITUTIONAL: awake, alert, cooperative, no apparent distress   EYES: pupils equal, round and reactive to light, sclera clear and conjunctiva normal  ENT: Normocephalic, without obvious abnormality, atraumatic  NECK: supple, symmetrical, no jugular venous distension and no carotid bruits   HEMATOLOGIC/LYMPHATIC: no cervical, supraclavicular or axillary lymphadenopathy   LUNGS: VBS,no rhonchi, no increased work of breathing, no wheezes, clear to auscultation, no crackles,     CARDIOVASCULAR: regular rate and rhythm, normal S1 and S2, no murmur noted  ABDOMEN: normal bowel sounds x 4, soft, non-distended, non-tender, no masses palpated, no hepatosplenomgaly   MUSCULOSKELETAL: full range of motion noted, tone is normal  NEUROLOGIC: awake, alert, oriented to name, place and time. Motor skills grossly intact. SKIN: Normal skin color, texture, turgor and no jaundice.  Skin appears intact   EXTREMITIES:  Left leg swelling, redness and tenderness +, no cyanosis, no clubbing     LABORATORY RESULTS  CBC:   Recent Labs     07/06/21  1123 07/07/21  1116   WBC 5.4 5.2   HGB 11.2* 10.7*    304     BMP:    Recent Labs     07/06/21  1123 07/08/21  0908    140   K 5.1 4.6    102   CO2 26 28   BUN 11 10   CREATININE 0.4* 0.5*   GLUCOSE 96 100*     Hepatic:   Recent Labs     07/06/21  1123   AST 17   ALT 8*   BILITOT 0.7   ALKPHOS 50     INR:   Recent Labs     07/06/21  1123 INR 0.96     ASSESSMENT  Left lower extremity recurrent DVT     RECOMMENDATION  She was first diagnosed with left LE DVT ~ 2009 or 2010. She has been on coumadin until 8/17/2015 when she presented with supra therapeutic INR. Coumadin was stooped since 8/15/2015. She then had recurrent left LE DVT on 7/13/2016 and she has been on eliquis since then.      Factor V leiden and prothrombin gene mutation done on 817/2015 were negative. Homocysteine was mildly elevated.      Since she failed eliquis, we started lovenox since 7/5/21. After 3 to 4 days of therapy with lovenox, we planned to switch it to pradaxa 150 mg BID. However, her insurance doesn't cover pradaxa. Will start coumadin if she agrees. 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      We will follow the patient.  Thank you

## 2021-07-09 NOTE — PROGRESS NOTES
Received call from MD that he put in order for PT/INR and would like it done ASAP. Lab notified if they can please come up to draw.

## 2021-07-10 LAB
ANION GAP SERPL CALCULATED.3IONS-SCNC: 10 MMOL/L (ref 4–16)
BUN BLDV-MCNC: 11 MG/DL (ref 6–23)
CALCIUM SERPL-MCNC: 9.3 MG/DL (ref 8.3–10.6)
CHLORIDE BLD-SCNC: 102 MMOL/L (ref 99–110)
CO2: 26 MMOL/L (ref 21–32)
CREAT SERPL-MCNC: 0.5 MG/DL (ref 0.6–1.1)
GFR AFRICAN AMERICAN: >60 ML/MIN/1.73M2
GFR NON-AFRICAN AMERICAN: >60 ML/MIN/1.73M2
GLUCOSE BLD-MCNC: 92 MG/DL (ref 70–99)
INR BLD: 0.88 INDEX
POTASSIUM SERPL-SCNC: 4.6 MMOL/L (ref 3.5–5.1)
PROTHROMBIN TIME: 11.4 SECONDS (ref 11.7–14.5)
SODIUM BLD-SCNC: 138 MMOL/L (ref 135–145)

## 2021-07-10 PROCEDURE — 2580000003 HC RX 258: Performed by: PHYSICIAN ASSISTANT

## 2021-07-10 PROCEDURE — 80048 BASIC METABOLIC PNL TOTAL CA: CPT

## 2021-07-10 PROCEDURE — 6370000000 HC RX 637 (ALT 250 FOR IP): Performed by: INTERNAL MEDICINE

## 2021-07-10 PROCEDURE — 2580000003 HC RX 258: Performed by: CLINICAL NURSE SPECIALIST

## 2021-07-10 PROCEDURE — 6360000002 HC RX W HCPCS: Performed by: INTERNAL MEDICINE

## 2021-07-10 PROCEDURE — 6370000000 HC RX 637 (ALT 250 FOR IP): Performed by: HOSPITALIST

## 2021-07-10 PROCEDURE — 1200000000 HC SEMI PRIVATE

## 2021-07-10 PROCEDURE — 6360000002 HC RX W HCPCS: Performed by: CLINICAL NURSE SPECIALIST

## 2021-07-10 PROCEDURE — 85610 PROTHROMBIN TIME: CPT

## 2021-07-10 PROCEDURE — 94761 N-INVAS EAR/PLS OXIMETRY MLT: CPT

## 2021-07-10 PROCEDURE — 36415 COLL VENOUS BLD VENIPUNCTURE: CPT

## 2021-07-10 RX ADMIN — GABAPENTIN 100 MG: 100 CAPSULE ORAL at 09:20

## 2021-07-10 RX ADMIN — METOPROLOL TARTRATE 25 MG: 25 TABLET, FILM COATED ORAL at 20:21

## 2021-07-10 RX ADMIN — MAGNESIUM OXIDE 400 MG (241.3 MG MAGNESIUM) TABLET 200 MG: TABLET at 20:21

## 2021-07-10 RX ADMIN — SODIUM CHLORIDE, PRESERVATIVE FREE 10 ML: 5 INJECTION INTRAVENOUS at 09:22

## 2021-07-10 RX ADMIN — TRAZODONE HYDROCHLORIDE 50 MG: 50 TABLET ORAL at 20:21

## 2021-07-10 RX ADMIN — HYDROCODONE BITARTRATE AND ACETAMINOPHEN 1 TABLET: 5; 325 TABLET ORAL at 13:52

## 2021-07-10 RX ADMIN — HYDROCODONE BITARTRATE AND ACETAMINOPHEN 1 TABLET: 5; 325 TABLET ORAL at 20:21

## 2021-07-10 RX ADMIN — FAMOTIDINE 20 MG: 20 TABLET ORAL at 20:22

## 2021-07-10 RX ADMIN — VANCOMYCIN HYDROCHLORIDE 1250 MG: 5 INJECTION, POWDER, LYOPHILIZED, FOR SOLUTION INTRAVENOUS at 13:33

## 2021-07-10 RX ADMIN — FAMOTIDINE 20 MG: 20 TABLET ORAL at 09:20

## 2021-07-10 RX ADMIN — ENOXAPARIN SODIUM 80 MG: 80 INJECTION SUBCUTANEOUS at 09:22

## 2021-07-10 RX ADMIN — GABAPENTIN 100 MG: 100 CAPSULE ORAL at 13:33

## 2021-07-10 RX ADMIN — ASPIRIN 81 MG: 81 TABLET, CHEWABLE ORAL at 09:20

## 2021-07-10 RX ADMIN — ENOXAPARIN SODIUM 80 MG: 80 INJECTION SUBCUTANEOUS at 20:21

## 2021-07-10 RX ADMIN — GABAPENTIN 100 MG: 100 CAPSULE ORAL at 20:22

## 2021-07-10 RX ADMIN — SODIUM CHLORIDE, PRESERVATIVE FREE 10 ML: 5 INJECTION INTRAVENOUS at 20:23

## 2021-07-10 RX ADMIN — ONDANSETRON 4 MG: 4 TABLET, ORALLY DISINTEGRATING ORAL at 13:52

## 2021-07-10 RX ADMIN — WARFARIN SODIUM 5 MG: 2.5 TABLET ORAL at 19:02

## 2021-07-10 RX ADMIN — HYDROCODONE BITARTRATE AND ACETAMINOPHEN 1 TABLET: 5; 325 TABLET ORAL at 04:43

## 2021-07-10 ASSESSMENT — PAIN DESCRIPTION - ONSET: ONSET: ON-GOING

## 2021-07-10 ASSESSMENT — PAIN DESCRIPTION - LOCATION
LOCATION: LEG
LOCATION: GENERALIZED
LOCATION: GENERALIZED

## 2021-07-10 ASSESSMENT — PAIN DESCRIPTION - ORIENTATION: ORIENTATION: RIGHT;LEFT

## 2021-07-10 ASSESSMENT — PAIN SCALES - GENERAL
PAINLEVEL_OUTOF10: 9
PAINLEVEL_OUTOF10: 8

## 2021-07-10 ASSESSMENT — PAIN DESCRIPTION - DESCRIPTORS: DESCRIPTORS: ACHING

## 2021-07-10 ASSESSMENT — PAIN DESCRIPTION - PAIN TYPE
TYPE: ACUTE PAIN
TYPE: ACUTE PAIN
TYPE: ACUTE PAIN;CHRONIC PAIN

## 2021-07-10 ASSESSMENT — PAIN - FUNCTIONAL ASSESSMENT: PAIN_FUNCTIONAL_ASSESSMENT: PREVENTS OR INTERFERES SOME ACTIVE ACTIVITIES AND ADLS

## 2021-07-10 ASSESSMENT — PAIN DESCRIPTION - FREQUENCY: FREQUENCY: CONTINUOUS

## 2021-07-10 ASSESSMENT — PAIN DESCRIPTION - PROGRESSION: CLINICAL_PROGRESSION: GRADUALLY WORSENING

## 2021-07-10 NOTE — PROGRESS NOTES
without murmurs, rubs or gallops. Abdomen: Soft, non-tender, non-distended with normal bowel sounds. Musculoskeletal: No clubbing, cyanosis or edema bilaterally. Full range of motion without deformity. Skin: Skin color, texture, turgor normal.  No rashes or lesions. Neurologic:  Neurovascularly intact without any focal sensory/motor deficits. Cranial nerves: II-XII intact, grossly non-focal.  Psychiatric: Alert and oriented, thought content appropriate, normal insight      Labs:   Recent Labs     07/09/21  0730   WBC 4.3   HGB 12.0*   HCT 38.8        Recent Labs     07/08/21  0908 07/09/21  0730 07/10/21  0738    139 138   K 4.6 4.8 4.6    101 102   CO2 28 27 26   BUN 10 10 11   CREATININE 0.5* 0.5* 0.5*   CALCIUM 9.7 9.6 9.3     No results for input(s): AST, ALT, BILIDIR, BILITOT, ALKPHOS in the last 72 hours. Recent Labs     07/08/21  1939 07/09/21  1143 07/10/21  0738   INR 0.88 0.88 0.88     No results for input(s): Shellia Bugler in the last 72 hours. Urinalysis:      Lab Results   Component Value Date    NITRU NEGATIVE 03/09/2012    WBCUA 1 03/09/2012    BACTERIA OCCASIONAL 03/09/2012    RBCUA <1 03/09/2012    BLOODU NEGATIVE 03/09/2012    SPECGRAV 1.013 03/09/2012       Radiology:  CTA PULMONARY W CONTRAST   Final Result   Motion limited study. Redemonstrated segmental filling defect within left lower lobe pulmonary   artery. Otherwise no gross evidence of pulmonary emboli although evaluation   quite limited due to motion artifact. Normal caliber main pulmonary artery. VL DUP LOWER EXTREMITY VENOUS BILATERAL   Final Result   DVT within the right superficial femoral vein and popliteal vein which may be   acute on chronic. No evidence of DVT within the left lower extremity. Nondiagnostic evaluation below the knees bilaterally due to edema.                  Assessment/Plan:    Active Hospital Problems    Diagnosis     DVT (deep vein thrombosis) in pregnancy [O22.30]      Hypercoagulable state-patient has had previous DVTs and PE.    DVT within the right superficial femoral vein and popliteal vein reported. Pt had medication failure and not a compliance issue.  -Continue Eliquis and is compliant with her medications. Lovenox shots as an outpatient. - Hematology-oncology consulted. Patient was switched from heparin to lovenox. Plan to switch to pradaxa. Cost is $374/month per pharmacy. Patient was switched to coumadin. Pharmacy monitoring INR.  -Continue gabapentin for pain in the leg    Cellulitis-bilateral lower limbs especially on the shins.    -Continue IV vancomycin(day 6/7). Plan to discharge on bactrim. Hypertension  -BP controlled. COPD without exacerbation  -continue bronchodilator treatment    Moderate Aortic stenosis  -Pt has been declining lasix. .    Insomnia  -She doesn't tolerate melatonin. Continue trazadone. DVT Prophylaxis: lovenox  Diet: ADULT DIET; Regular; Low Fat/Low Chol/High Fiber/2 gm Na  Adult Oral Nutrition Supplement; Low Calorie/High Protein Oral Supplement  Code Status: Full Code    PT/OT Eval Status: ordered    Dispo - Home with home health when ok with Oncology. ? Home with lovenox bridge or wait for INR to be therapeutic. ?    Patric Greenwood MD

## 2021-07-10 NOTE — PROGRESS NOTES
ONCOLOGY HEMATOLOGY CARE ([facility])  PROGRESS NOTE      Patient was seen and examined today. Not in any acute distress and no overnight events. She is getting better today. She is on lovenox since 7/5/21 and she is tolerating it well. Her hemoglobin was 12 today. Her insurance doesn't cover pradaxa. Coumadin was started today. No new complaints. PHYSICAL EXAM    Vitals: /70   Pulse 110   Temp 98.1 °F (36.7 °C) (Oral)   Resp 18   Ht 5' 11\" (1.803 m)   Wt 188 lb (85.3 kg)   SpO2 93%   BMI 26.22 kg/m²   CONSTITUTIONAL: awake, alert, cooperative, no apparent distress   EYES: pupils equal, round and reactive to light, sclera clear and conjunctiva normal  ENT: Normocephalic, without obvious abnormality, atraumatic  NECK: supple, symmetrical, no jugular venous distension and no carotid bruits   HEMATOLOGIC/LYMPHATIC: no cervical, supraclavicular or axillary lymphadenopathy   LUNGS: VBS, no wheezes, clear to auscultation, no crackles,no rhonchi, no increased work of breathing,      CARDIOVASCULAR: regular rate and rhythm, normal S1 and S2, no murmur noted  ABDOMEN: normal bowel sounds x 4, soft, non-distended, non-tender, no masses palpated, no hepatosplenomgaly   MUSCULOSKELETAL: full range of motion noted, tone is normal  NEUROLOGIC: awake, alert, oriented to name, place and time. Motor skills grossly intact. SKIN: Normal skin color, texture, turgor and no jaundice. Skin appears intact   EXTREMITIES:  redness and tenderness +, no cyanosis, Left leg swelling, no clubbing     LABORATORY RESULTS  CBC:   Recent Labs     07/07/21  1116 07/09/21  0730   WBC 5.2 4.3   HGB 10.7* 12.0*    291     BMP:    Recent Labs     07/08/21  0908 07/09/21  0730    139   K 4.6 4.8    101   CO2 28 27   BUN 10 10   CREATININE 0.5* 0.5*   GLUCOSE 100* 86     Hepatic:   No results for input(s): AST, ALT, ALB, BILITOT, ALKPHOS in the last 72 hours.   INR:   Recent Labs     07/08/21  1939 07/09/21  1143   INR 0.88 0.88     ASSESSMENT  Left lower extremity recurrent DVT     RECOMMENDATION  She was first diagnosed with left LE DVT ~ 2009 or 2010. She has been on coumadin until 8/17/2015 when she presented with supra therapeutic INR. Coumadin was stooped since 8/15/2015. She then had recurrent left LE DVT on 7/13/2016 and she has been on eliquis since then.      Factor V leiden and prothrombin gene mutation done on 817/2015 were negative. Homocysteine was mildly elevated.      Since she failed eliquis, we started lovenox since 7/5/21. We planned to switch it to pradaxa, however, her insurance doesn't cover pradaxa. Coumadin was started today. 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      We will follow the patient.  Thank you

## 2021-07-11 LAB
HCT VFR BLD CALC: 33.7 % (ref 37–47)
HEMOGLOBIN: 10.5 GM/DL (ref 12.5–16)
INR BLD: 0.87 INDEX
MCH RBC QN AUTO: 32.2 PG (ref 27–31)
MCHC RBC AUTO-ENTMCNC: 31.2 % (ref 32–36)
MCV RBC AUTO: 103.4 FL (ref 78–100)
PDW BLD-RTO: 14.6 % (ref 11.7–14.9)
PLATELET # BLD: 215 K/CU MM (ref 140–440)
PMV BLD AUTO: 10 FL (ref 7.5–11.1)
PROTHROMBIN TIME: 11.2 SECONDS (ref 11.7–14.5)
RBC # BLD: 3.26 M/CU MM (ref 4.2–5.4)
WBC # BLD: 4.5 K/CU MM (ref 4–10.5)

## 2021-07-11 PROCEDURE — 6370000000 HC RX 637 (ALT 250 FOR IP): Performed by: INTERNAL MEDICINE

## 2021-07-11 PROCEDURE — 2580000003 HC RX 258: Performed by: HOSPITALIST

## 2021-07-11 PROCEDURE — 6360000002 HC RX W HCPCS: Performed by: CLINICAL NURSE SPECIALIST

## 2021-07-11 PROCEDURE — 6360000002 HC RX W HCPCS: Performed by: INTERNAL MEDICINE

## 2021-07-11 PROCEDURE — 6370000000 HC RX 637 (ALT 250 FOR IP): Performed by: HOSPITALIST

## 2021-07-11 PROCEDURE — 85027 COMPLETE CBC AUTOMATED: CPT

## 2021-07-11 PROCEDURE — 94761 N-INVAS EAR/PLS OXIMETRY MLT: CPT

## 2021-07-11 PROCEDURE — 1200000000 HC SEMI PRIVATE

## 2021-07-11 PROCEDURE — 2580000003 HC RX 258: Performed by: CLINICAL NURSE SPECIALIST

## 2021-07-11 PROCEDURE — 94640 AIRWAY INHALATION TREATMENT: CPT

## 2021-07-11 PROCEDURE — 2580000003 HC RX 258: Performed by: PHYSICIAN ASSISTANT

## 2021-07-11 PROCEDURE — 36415 COLL VENOUS BLD VENIPUNCTURE: CPT

## 2021-07-11 PROCEDURE — 85610 PROTHROMBIN TIME: CPT

## 2021-07-11 RX ORDER — ALBUTEROL SULFATE 90 UG/1
2 AEROSOL, METERED RESPIRATORY (INHALATION)
Status: DISCONTINUED | OUTPATIENT
Start: 2021-07-11 | End: 2021-07-14 | Stop reason: HOSPADM

## 2021-07-11 RX ADMIN — FAMOTIDINE 20 MG: 20 TABLET ORAL at 10:17

## 2021-07-11 RX ADMIN — GABAPENTIN 100 MG: 100 CAPSULE ORAL at 21:26

## 2021-07-11 RX ADMIN — ENOXAPARIN SODIUM 80 MG: 80 INJECTION SUBCUTANEOUS at 21:26

## 2021-07-11 RX ADMIN — ASPIRIN 81 MG: 81 TABLET, CHEWABLE ORAL at 10:17

## 2021-07-11 RX ADMIN — HYDROCODONE BITARTRATE AND ACETAMINOPHEN 1 TABLET: 5; 325 TABLET ORAL at 10:17

## 2021-07-11 RX ADMIN — ENOXAPARIN SODIUM 80 MG: 80 INJECTION SUBCUTANEOUS at 10:17

## 2021-07-11 RX ADMIN — HYDROCODONE BITARTRATE AND ACETAMINOPHEN 1 TABLET: 5; 325 TABLET ORAL at 02:54

## 2021-07-11 RX ADMIN — SODIUM CHLORIDE, PRESERVATIVE FREE 10 ML: 5 INJECTION INTRAVENOUS at 21:26

## 2021-07-11 RX ADMIN — METOPROLOL TARTRATE 25 MG: 25 TABLET, FILM COATED ORAL at 21:26

## 2021-07-11 RX ADMIN — WARFARIN SODIUM 5 MG: 2.5 TABLET ORAL at 16:38

## 2021-07-11 RX ADMIN — GABAPENTIN 100 MG: 100 CAPSULE ORAL at 10:17

## 2021-07-11 RX ADMIN — FAMOTIDINE 20 MG: 20 TABLET ORAL at 21:26

## 2021-07-11 RX ADMIN — VANCOMYCIN HYDROCHLORIDE 1250 MG: 5 INJECTION, POWDER, LYOPHILIZED, FOR SOLUTION INTRAVENOUS at 06:27

## 2021-07-11 RX ADMIN — MAGNESIUM OXIDE 400 MG (241.3 MG MAGNESIUM) TABLET 200 MG: TABLET at 21:26

## 2021-07-11 RX ADMIN — ALBUTEROL SULFATE 2 PUFF: 90 AEROSOL, METERED RESPIRATORY (INHALATION) at 13:36

## 2021-07-11 RX ADMIN — ALBUTEROL SULFATE 2 PUFF: 90 AEROSOL, METERED RESPIRATORY (INHALATION) at 20:04

## 2021-07-11 RX ADMIN — SODIUM CHLORIDE, PRESERVATIVE FREE 10 ML: 5 INJECTION INTRAVENOUS at 21:27

## 2021-07-11 RX ADMIN — HYDROCODONE BITARTRATE AND ACETAMINOPHEN 1 TABLET: 5; 325 TABLET ORAL at 16:56

## 2021-07-11 RX ADMIN — TRAZODONE HYDROCHLORIDE 50 MG: 50 TABLET ORAL at 21:26

## 2021-07-11 RX ADMIN — GABAPENTIN 100 MG: 100 CAPSULE ORAL at 16:38

## 2021-07-11 RX ADMIN — METOPROLOL TARTRATE 25 MG: 25 TABLET, FILM COATED ORAL at 10:16

## 2021-07-11 RX ADMIN — FUROSEMIDE 20 MG: 20 TABLET ORAL at 10:17

## 2021-07-11 RX ADMIN — FUROSEMIDE 20 MG: 20 TABLET ORAL at 16:38

## 2021-07-11 ASSESSMENT — PAIN DESCRIPTION - ORIENTATION: ORIENTATION: RIGHT;LEFT

## 2021-07-11 ASSESSMENT — PAIN DESCRIPTION - DESCRIPTORS: DESCRIPTORS: ACHING

## 2021-07-11 ASSESSMENT — PAIN SCALES - GENERAL
PAINLEVEL_OUTOF10: 0
PAINLEVEL_OUTOF10: 7
PAINLEVEL_OUTOF10: 0
PAINLEVEL_OUTOF10: 9
PAINLEVEL_OUTOF10: 4
PAINLEVEL_OUTOF10: 10

## 2021-07-11 ASSESSMENT — PAIN DESCRIPTION - LOCATION
LOCATION: GENERALIZED
LOCATION: GENERALIZED

## 2021-07-11 ASSESSMENT — PAIN DESCRIPTION - PAIN TYPE
TYPE: ACUTE PAIN
TYPE: ACUTE PAIN

## 2021-07-11 ASSESSMENT — PAIN DESCRIPTION - FREQUENCY: FREQUENCY: CONTINUOUS

## 2021-07-11 NOTE — PROGRESS NOTES
Hospitalist Progress Note      PCP: Andre Manley, APRN - CNP    Date of Admission: 7/4/2021    Hospital Course: \"70 y.o.  female with a history of DVT and PE who presents with progressive swelling of lower limbs and was found to have another DVT. \"    Subjective:   Pt has been coughing. She has pain in her legs. Medications:  Reviewed    Infusion Medications    sodium chloride Stopped (07/09/21 0328)     Scheduled Medications    warfarin  5 mg Oral Daily    metoprolol tartrate  25 mg Oral BID    gabapentin  100 mg Oral TID    traZODone  50 mg Oral Nightly    furosemide  20 mg Oral BID    aspirin  81 mg Oral Daily    magnesium oxide  200 mg Oral Daily    sodium chloride flush  5-40 mL Intravenous 2 times per day    famotidine  20 mg Oral BID    sodium chloride flush  5-40 mL Intravenous BID    vancomycin  1,250 mg Intravenous Q18H    enoxaparin  1 mg/kg Subcutaneous BID     PRN Meds: melatonin, albuterol sulfate HFA, HYDROcodone-acetaminophen, sodium chloride flush, sodium chloride, ondansetron **OR** ondansetron, polyethylene glycol, acetaminophen **OR** acetaminophen, potassium chloride **OR** potassium alternative oral replacement **OR** potassium chloride, magnesium sulfate      Intake/Output Summary (Last 24 hours) at 7/11/2021 0871  Last data filed at 7/10/2021 0930  Gross per 24 hour   Intake 240 ml   Output --   Net 240 ml       Physical Exam Performed:    BP (!) 140/67   Pulse 79   Temp 98.4 °F (36.9 °C) (Oral)   Resp 16   Ht 5' 11\" (1.803 m)   Wt 188 lb 9.6 oz (85.5 kg)   SpO2 93%   BMI 26.30 kg/m²     General appearance: No apparent distress, appears stated age and cooperative. HEENT: Pupils equal, round, and reactive to light. Conjunctivae/corneas clear. Neck: Supple, with full range of motion. No jugular venous distention. Trachea midline. Respiratory:  Normal respiratory effort. Clear to auscultation, bilaterally without Rales/Wheezes/Rhonchi.   Cardiovascular: Regular rate and rhythm with normal S1/S2 without murmurs, rubs or gallops. Abdomen: Soft, non-tender, non-distended with normal bowel sounds. Musculoskeletal: No clubbing, cyanosis or edema bilaterally. Full range of motion without deformity. Skin: Skin color, texture, turgor normal.  No rashes or lesions. Neurologic:  Neurovascularly intact without any focal sensory/motor deficits. Cranial nerves: II-XII intact, grossly non-focal.  Psychiatric: Alert and oriented, thought content appropriate, normal insight      Labs:   Recent Labs     07/09/21  0730   WBC 4.3   HGB 12.0*   HCT 38.8        Recent Labs     07/08/21  0908 07/09/21  0730 07/10/21  0738    139 138   K 4.6 4.8 4.6    101 102   CO2 28 27 26   BUN 10 10 11   CREATININE 0.5* 0.5* 0.5*   CALCIUM 9.7 9.6 9.3     No results for input(s): AST, ALT, BILIDIR, BILITOT, ALKPHOS in the last 72 hours. Recent Labs     07/08/21  1939 07/09/21  1143 07/10/21  0738   INR 0.88 0.88 0.88     No results for input(s): [place] in the last 72 hours. Urinalysis:      Lab Results   Component Value Date    NITRU NEGATIVE 03/09/2012    WBCUA 1 03/09/2012    BACTERIA OCCASIONAL 03/09/2012    RBCUA <1 03/09/2012    BLOODU NEGATIVE 03/09/2012    SPECGRAV 1.013 03/09/2012       Radiology:  CTA PULMONARY W CONTRAST   Final Result   Motion limited study. Redemonstrated segmental filling defect within left lower lobe pulmonary   artery. Otherwise no gross evidence of pulmonary emboli although evaluation   quite limited due to motion artifact. Normal caliber main pulmonary artery. VL DUP LOWER EXTREMITY VENOUS BILATERAL   Final Result   DVT within the right superficial femoral vein and popliteal vein which may be   acute on chronic. No evidence of DVT within the left lower extremity. Nondiagnostic evaluation below the knees bilaterally due to edema.                  Assessment/Plan:    Active Hospital Problems    Diagnosis     DVT (deep

## 2021-07-12 LAB
INR BLD: 0.98 INDEX
PROTHROMBIN TIME: 12.6 SECONDS (ref 11.7–14.5)

## 2021-07-12 PROCEDURE — 6370000000 HC RX 637 (ALT 250 FOR IP): Performed by: INTERNAL MEDICINE

## 2021-07-12 PROCEDURE — 1200000000 HC SEMI PRIVATE

## 2021-07-12 PROCEDURE — 94761 N-INVAS EAR/PLS OXIMETRY MLT: CPT

## 2021-07-12 PROCEDURE — 94640 AIRWAY INHALATION TREATMENT: CPT

## 2021-07-12 PROCEDURE — 6360000002 HC RX W HCPCS: Performed by: INTERNAL MEDICINE

## 2021-07-12 PROCEDURE — 99232 SBSQ HOSP IP/OBS MODERATE 35: CPT | Performed by: INTERNAL MEDICINE

## 2021-07-12 PROCEDURE — 36415 COLL VENOUS BLD VENIPUNCTURE: CPT

## 2021-07-12 PROCEDURE — 85610 PROTHROMBIN TIME: CPT

## 2021-07-12 PROCEDURE — 6370000000 HC RX 637 (ALT 250 FOR IP): Performed by: HOSPITALIST

## 2021-07-12 PROCEDURE — 92610 EVALUATE SWALLOWING FUNCTION: CPT

## 2021-07-12 PROCEDURE — 2580000003 HC RX 258: Performed by: HOSPITALIST

## 2021-07-12 RX ORDER — DOCUSATE SODIUM 100 MG/1
100 CAPSULE, LIQUID FILLED ORAL DAILY
Status: DISCONTINUED | OUTPATIENT
Start: 2021-07-13 | End: 2021-07-14 | Stop reason: HOSPADM

## 2021-07-12 RX ADMIN — HYDROCODONE BITARTRATE AND ACETAMINOPHEN 1 TABLET: 5; 325 TABLET ORAL at 08:37

## 2021-07-12 RX ADMIN — ENOXAPARIN SODIUM 80 MG: 80 INJECTION SUBCUTANEOUS at 08:39

## 2021-07-12 RX ADMIN — ALBUTEROL SULFATE 2 PUFF: 90 AEROSOL, METERED RESPIRATORY (INHALATION) at 20:36

## 2021-07-12 RX ADMIN — FAMOTIDINE 20 MG: 20 TABLET ORAL at 21:34

## 2021-07-12 RX ADMIN — ENOXAPARIN SODIUM 80 MG: 80 INJECTION SUBCUTANEOUS at 21:35

## 2021-07-12 RX ADMIN — HYDROCODONE BITARTRATE AND ACETAMINOPHEN 1 TABLET: 5; 325 TABLET ORAL at 15:25

## 2021-07-12 RX ADMIN — TRAZODONE HYDROCHLORIDE 50 MG: 50 TABLET ORAL at 21:34

## 2021-07-12 RX ADMIN — GABAPENTIN 100 MG: 100 CAPSULE ORAL at 08:38

## 2021-07-12 RX ADMIN — GABAPENTIN 100 MG: 100 CAPSULE ORAL at 15:26

## 2021-07-12 RX ADMIN — HYDROCODONE BITARTRATE AND ACETAMINOPHEN 1 TABLET: 5; 325 TABLET ORAL at 22:59

## 2021-07-12 RX ADMIN — GABAPENTIN 100 MG: 100 CAPSULE ORAL at 21:34

## 2021-07-12 RX ADMIN — FUROSEMIDE 20 MG: 20 TABLET ORAL at 19:06

## 2021-07-12 RX ADMIN — ASPIRIN 81 MG: 81 TABLET, CHEWABLE ORAL at 08:38

## 2021-07-12 RX ADMIN — FUROSEMIDE 20 MG: 20 TABLET ORAL at 08:37

## 2021-07-12 RX ADMIN — FAMOTIDINE 20 MG: 20 TABLET ORAL at 08:38

## 2021-07-12 RX ADMIN — SODIUM CHLORIDE, PRESERVATIVE FREE 10 ML: 5 INJECTION INTRAVENOUS at 08:38

## 2021-07-12 RX ADMIN — MAGNESIUM OXIDE 400 MG (241.3 MG MAGNESIUM) TABLET 200 MG: TABLET at 21:34

## 2021-07-12 RX ADMIN — WARFARIN SODIUM 5 MG: 2.5 TABLET ORAL at 19:06

## 2021-07-12 RX ADMIN — METOPROLOL TARTRATE 25 MG: 25 TABLET, FILM COATED ORAL at 08:38

## 2021-07-12 ASSESSMENT — PAIN SCALES - GENERAL
PAINLEVEL_OUTOF10: 10
PAINLEVEL_OUTOF10: 3
PAINLEVEL_OUTOF10: 0
PAINLEVEL_OUTOF10: 10
PAINLEVEL_OUTOF10: 9
PAINLEVEL_OUTOF10: 0

## 2021-07-12 ASSESSMENT — PAIN DESCRIPTION - LOCATION: LOCATION: LEG

## 2021-07-12 ASSESSMENT — PAIN DESCRIPTION - DIRECTION: RADIATING_TOWARDS: DOWN

## 2021-07-12 ASSESSMENT — PAIN DESCRIPTION - PAIN TYPE: TYPE: ACUTE PAIN

## 2021-07-12 ASSESSMENT — PAIN DESCRIPTION - ORIENTATION: ORIENTATION: LOWER;LEFT

## 2021-07-12 ASSESSMENT — PAIN - FUNCTIONAL ASSESSMENT: PAIN_FUNCTIONAL_ASSESSMENT: ACTIVITIES ARE NOT PREVENTED

## 2021-07-12 ASSESSMENT — PAIN DESCRIPTION - FREQUENCY: FREQUENCY: CONTINUOUS

## 2021-07-12 ASSESSMENT — PAIN DESCRIPTION - DESCRIPTORS: DESCRIPTORS: ACHING

## 2021-07-12 ASSESSMENT — PAIN DESCRIPTION - PROGRESSION: CLINICAL_PROGRESSION: NOT CHANGED

## 2021-07-12 ASSESSMENT — PAIN DESCRIPTION - ONSET: ONSET: ON-GOING

## 2021-07-12 NOTE — PROGRESS NOTES
Comprehensive Nutrition Assessment    Type and Reason for Visit:  Reassess    Nutrition Recommendations/Plan:   · Continue current diet and supplements  · Please encourage snacks between meals or from floor ourishment rooms    Nutrition Assessment:  Remains on cardiac diet. C/o back pain, vomiting, and nausea at visit. Skipped lunch d/t emesis. Reports had emesis twice yesterday and emesis once today. Pt's intake remains poor. Pt keeps supplements at bedside but drinks only 50%. Recommended gingerale and encouraged smaller snacks. High nutrition risk at this time. Malnutrition Assessment:  Malnutrition Status: At risk for malnutrition (Comment)    Context:  Acute Illness     Findings of the 6 clinical characteristics of malnutrition:  Energy Intake:  Mild decrease in energy intake (Comment)  Weight Loss:  Unable to assess (reported wt loss-limited wt hx)     Body Fat Loss:  No significant body fat loss     Muscle Mass Loss:  1 - Mild muscle mass loss Temples (temporalis)  Fluid Accumulation:  1 - Mild Extremities   Strength:  Not Performed    Estimated Daily Nutrient Needs:  Energy (kcal):  8618-6987 (25-30 radha/kg); Weight Used for Energy Requirements:  Ideal     Protein (g):  70-84(1-1.2 g/kg); Weight Used for Protein Requirements:  Ideal        Fluid (ml/day):  1800; Method Used for Fluid Requirements:  1 ml/kcal      Nutrition Related Findings:  rx: mag ox, coumadin, lasix +non-pitting BLE edema      Current Nutrition Therapies:    ADULT DIET; Regular; Low Fat/Low Chol/High Fiber/2 gm Na  Adult Oral Nutrition Supplement;  Low Calorie/High Protein Oral Supplement    Anthropometric Measures:  · Height: 5' 10.98\" (180.3 cm)  · Current Body Weight: 188 lb 7.9 oz (85.5 kg)   · Admission Body Weight: 188 lb (85.3 kg)    · Usual Body Weight: 193 lb (87.5 kg) (per patient)     · Ideal Body Weight: 155 lbs; % Ideal Body Weight 121.6 %   · BMI: 26.3  · Adjusted Body Weight:  ; No Adjustment   · Adjusted BMI: · BMI Categories: Obese Class 1 (BMI 30.0-34. 9)       Nutrition Diagnosis:   · Inadequate oral intake related to other (comment) (reduced appetite in illness) as evidenced by intake 0-25%, poor intake prior to admission, nausea, vomiting    Nutrition Interventions:   Food and/or Nutrient Delivery:  Continue Current Diet, Continue Oral Nutrition Supplement  Nutrition Education/Counseling:  No recommendation at this time   Coordination of Nutrition Care:  Continue to monitor while inpatient, Coordination of Community Care    Goals:  Patient will consume at least 50-75% at meals during stay       Nutrition Monitoring and Evaluation:   Behavioral-Environmental Outcomes:  None Identified   Food/Nutrient Intake Outcomes:  Supplement Intake, Diet Advancement/Tolerance, Food and Nutrient Intake  Physical Signs/Symptoms Outcomes:  Biochemical Data, GI Status, Nausea or Vomiting, Fluid Status or Edema, Skin, Weight     Discharge Planning:    Continue current diet, Continue Oral Nutrition Supplement     Electronically signed by Mary Brunner RD, LD on 7/12/21 at 1:56 PM EDT    Contact: [phone]

## 2021-07-12 NOTE — PROGRESS NOTES
Hospitalist Progress Note      PCP: Lorrayne Closs, APRN - CNP    Date of Admission: 7/4/2021    Hospital Course: \"70 y.o.  female with a history of DVT and PE who presents with progressive swelling of lower limbs and was found to have another DVT. \"    Subjective:   She had nausea today. She has chronic cough. Medications:  Reviewed    Infusion Medications    sodium chloride Stopped (07/09/21 0328)     Scheduled Medications    albuterol sulfate HFA  2 puff Inhalation Q6H WA    warfarin  5 mg Oral Daily    metoprolol tartrate  25 mg Oral BID    gabapentin  100 mg Oral TID    traZODone  50 mg Oral Nightly    furosemide  20 mg Oral BID    aspirin  81 mg Oral Daily    magnesium oxide  200 mg Oral Daily    sodium chloride flush  5-40 mL Intravenous 2 times per day    famotidine  20 mg Oral BID    sodium chloride flush  5-40 mL Intravenous BID    enoxaparin  1 mg/kg Subcutaneous BID     PRN Meds: melatonin, albuterol sulfate HFA, HYDROcodone-acetaminophen, sodium chloride flush, sodium chloride, ondansetron **OR** ondansetron, polyethylene glycol, acetaminophen **OR** acetaminophen, potassium chloride **OR** potassium alternative oral replacement **OR** potassium chloride, magnesium sulfate      Intake/Output Summary (Last 24 hours) at 7/12/2021 1349  Last data filed at 7/11/2021 2123  Gross per 24 hour   Intake 380 ml   Output 600 ml   Net -220 ml       Physical Exam Performed:    /67   Pulse 92   Temp 98.5 °F (36.9 °C) (Oral)   Resp 16   Ht 5' 11\" (1.803 m)   Wt 188 lb 9.6 oz (85.5 kg)   SpO2 93%   BMI 26.30 kg/m²     General appearance: No apparent distress, appears stated age and cooperative. HEENT: Pupils equal, round, and reactive to light. Conjunctivae/corneas clear. Neck: Supple, with full range of motion. No jugular venous distention. Trachea midline. Respiratory:  Normal respiratory effort. Clear to auscultation, bilaterally without Rales/Wheezes/Rhonchi.   Cardiovascular: Regular

## 2021-07-12 NOTE — PROGRESS NOTES
Speech Language Pathology  Facility/Department: [address]   CLINICAL BEDSIDE SWALLOW EVALUATION    NAME: Ko Gonzalez  : 1940  MRN: [id number]    IMPRESSIONS: Ko Gonzalez was referred for a bedside swallow evaluation after being admitted to [facility] with DVT. She reports occasional difficulty swallowing meats and other tough solids, globus sensation, and sensation of difficulty swallowing her saliva during conversations. She further reports ongoing nausea/vomiting and occasional regurgitation. Denies difficulty swallowing liquids. Medical hx includes PE, DVT, COPD, seizures, SDH (). No known history of dysphagia prior to admission. Pt seen for evaluation seated upright at edge of bed, alert, cooperative. Niece present for end of the session. Oral mechanism examination WFL/no asymmetry. Noted pt is edentulous, and she reports she has dentures at home. She was presented with PO trials of thin liquids via cup/straw and regular solids. Oropharyngeal swallow WFL with slow mastication, intact AP transit/clearance, and no s/s aspiration across all trials. Reflux precautions were reviewed with pt, who verbalized understanding and agreement. Recommend continued regular diet/thin liquids, reflux precautions. No further acute SLP needs identified.  Results/recommendations d/w pt and RN.    ADMISSION DATE: 2021  ADMITTING DIAGNOSIS: has Coumadin toxicity; Pulmonary embolism and infarction ([address] DVT (deep vein thrombosis) in pregnancy; and Acute deep vein thrombosis (DVT) of right lower extremity ([address] on their problem list.  ONSET DATE: this admission    Recent Chest Xray/CT of Chest: see chart    Date of Eval: 2021  Evaluating Therapist: BANDAR Santiago    Current Diet level:  Current Diet : Regular  Current Liquid Diet : Thin      Primary Complaint  Patient Complaint: 3 month history of difficulty swallowing meats/tougher solids, globus sensation, occasional regurgitation    Pain:  Pain Assessment  Pain Assessment: 0-10  Pain Level: 9  Patient's Stated Pain Goal: No pain  Pain Type: Acute pain  Pain Location: Generalized  Pain Orientation: Right, Left  Pain Radiating Towards: down  Pain Descriptors: Aching  Pain Frequency: Continuous  Pain Onset: On-going  Clinical Progression: Gradually worsening  Functional Pain Assessment: Prevents or interferes some active activities and ADLs  Non-Pharmaceutical Pain Intervention(s): Rest, Emotional support  Response to Pain Intervention: Patient Satisfied    Reason for Referral  Stas Enamorado was referred for a bedside swallow evaluation to assess the efficiency of her swallow function, identify signs and symptoms of aspiration and make recommendations regarding safe dietary consistencies, effective compensatory strategies, and safe eating environment. Impression  Dysphagia Diagnosis: Swallow function appears grossly intact  Dysphagia Outcome Severity Scale: Level 6: Within functional limits/Modified independence     Treatment Plan  Requires SLP Intervention: No  Duration/Frequency of Treatment: N/A  D/C Recommendations: To be determined       Recommended Diet and Intervention  Diet Solids Recommendation: Regular  Liquid Consistency Recommendation: Thin  Recommended Form of Meds: PO          Compensatory Swallowing Strategies  Compensatory Swallowing Strategies: Upright as possible for all oral intake;Remain upright for 30-45 minutes after meals;Small bites/sips;Eat/Feed slowly; Alternate solids and liquids    Treatment/Goals  Short-term Goals  Timeframe for Short-term Goals: N/A    General  Chart Reviewed: Yes  Behavior/Cognition: Alert; Cooperative  Respiratory Status: Room air  O2 Device: None (Room air)  Communication Observation: Functional  Follows Directions: Simple  Dentition: Edentulous (reports she has dentures at home)  Patient Positioning: Upright in bed  Baseline Vocal Quality:  (rough/low, baseline)  Prior Dysphagia History: no previous notes in chart  Consistencies Administered: Reg solid; Thin - cup; Thin - straw           Vision/Hearing  Hearing  Hearing: Exceptions to [facility]  Hearing Exceptions: Hard of hearing/hearing concerns    Oral Motor Deficits  Oral/Motor  Oral Motor: Within functional limits    Oral Phase Dysfunction  Oral Phase  Oral Phase: WFL     Indicators of Pharyngeal Phase Dysfunction   Pharyngeal Phase  Pharyngeal Phase: WFL    Prognosis  Prognosis  Barriers/Prognosis Comment: N/A  Individuals consulted  Consulted and agree with results and recommendations: Patient; Family member;RN  Family member consulted: niece    Education  Patient Education: results, recommendations  Patient Education Response: Verbalizes understanding  Safety Devices in place: Yes  Type of devices:  All fall risk precautions in place       Therapy Time  SLP Individual Minutes  Time In: 1430  Time Out: 601 [address]  Minutes: 600 [place], [place] [id number] [place] [address]  7/12/2021 3:23 PM

## 2021-07-13 PROBLEM — R07.2 PRECORDIAL PAIN: Status: ACTIVE | Noted: 2021-07-13

## 2021-07-13 LAB
HCT VFR BLD CALC: 35.9 % (ref 37–47)
HEMOGLOBIN: 11 GM/DL (ref 12.5–16)
INR BLD: 1.07 INDEX
MCH RBC QN AUTO: 31.9 PG (ref 27–31)
MCHC RBC AUTO-ENTMCNC: 30.6 % (ref 32–36)
MCV RBC AUTO: 104.1 FL (ref 78–100)
PDW BLD-RTO: 14.4 % (ref 11.7–14.9)
PLATELET # BLD: 209 K/CU MM (ref 140–440)
PMV BLD AUTO: 10 FL (ref 7.5–11.1)
PROTHROMBIN TIME: 13.8 SECONDS (ref 11.7–14.5)
RBC # BLD: 3.45 M/CU MM (ref 4.2–5.4)
TROPONIN T: <0.01 NG/ML
WBC # BLD: 4.1 K/CU MM (ref 4–10.5)

## 2021-07-13 PROCEDURE — 93005 ELECTROCARDIOGRAM TRACING: CPT | Performed by: INTERNAL MEDICINE

## 2021-07-13 PROCEDURE — 36415 COLL VENOUS BLD VENIPUNCTURE: CPT

## 2021-07-13 PROCEDURE — 94640 AIRWAY INHALATION TREATMENT: CPT

## 2021-07-13 PROCEDURE — 6370000000 HC RX 637 (ALT 250 FOR IP): Performed by: HOSPITALIST

## 2021-07-13 PROCEDURE — 85610 PROTHROMBIN TIME: CPT

## 2021-07-13 PROCEDURE — 6370000000 HC RX 637 (ALT 250 FOR IP): Performed by: INTERNAL MEDICINE

## 2021-07-13 PROCEDURE — 99232 SBSQ HOSP IP/OBS MODERATE 35: CPT | Performed by: INTERNAL MEDICINE

## 2021-07-13 PROCEDURE — 1200000000 HC SEMI PRIVATE

## 2021-07-13 PROCEDURE — 6370000000 HC RX 637 (ALT 250 FOR IP): Performed by: NURSE PRACTITIONER

## 2021-07-13 PROCEDURE — 2580000003 HC RX 258: Performed by: HOSPITALIST

## 2021-07-13 PROCEDURE — 6360000002 HC RX W HCPCS: Performed by: INTERNAL MEDICINE

## 2021-07-13 PROCEDURE — 85027 COMPLETE CBC AUTOMATED: CPT

## 2021-07-13 PROCEDURE — 2580000003 HC RX 258: Performed by: PHYSICIAN ASSISTANT

## 2021-07-13 PROCEDURE — 84484 ASSAY OF TROPONIN QUANT: CPT

## 2021-07-13 PROCEDURE — 94761 N-INVAS EAR/PLS OXIMETRY MLT: CPT

## 2021-07-13 RX ORDER — NITROGLYCERIN 0.4 MG/1
0.4 TABLET SUBLINGUAL EVERY 5 MIN PRN
Status: DISCONTINUED | OUTPATIENT
Start: 2021-07-13 | End: 2021-07-14 | Stop reason: HOSPADM

## 2021-07-13 RX ADMIN — ENOXAPARIN SODIUM 80 MG: 80 INJECTION SUBCUTANEOUS at 09:26

## 2021-07-13 RX ADMIN — HYDROCODONE BITARTRATE AND ACETAMINOPHEN 1 TABLET: 5; 325 TABLET ORAL at 18:08

## 2021-07-13 RX ADMIN — ACETAMINOPHEN 650 MG: 325 TABLET ORAL at 20:33

## 2021-07-13 RX ADMIN — FUROSEMIDE 20 MG: 20 TABLET ORAL at 09:27

## 2021-07-13 RX ADMIN — ALBUTEROL SULFATE 2 PUFF: 90 AEROSOL, METERED RESPIRATORY (INHALATION) at 15:42

## 2021-07-13 RX ADMIN — FAMOTIDINE 20 MG: 20 TABLET ORAL at 20:34

## 2021-07-13 RX ADMIN — SODIUM CHLORIDE, PRESERVATIVE FREE 10 ML: 5 INJECTION INTRAVENOUS at 09:26

## 2021-07-13 RX ADMIN — ALBUTEROL SULFATE 2 PUFF: 90 AEROSOL, METERED RESPIRATORY (INHALATION) at 07:21

## 2021-07-13 RX ADMIN — FAMOTIDINE 20 MG: 20 TABLET ORAL at 09:27

## 2021-07-13 RX ADMIN — ALBUTEROL SULFATE 2 PUFF: 90 AEROSOL, METERED RESPIRATORY (INHALATION) at 20:50

## 2021-07-13 RX ADMIN — TRAZODONE HYDROCHLORIDE 50 MG: 50 TABLET ORAL at 20:34

## 2021-07-13 RX ADMIN — SODIUM CHLORIDE, PRESERVATIVE FREE 10 ML: 5 INJECTION INTRAVENOUS at 20:36

## 2021-07-13 RX ADMIN — GABAPENTIN 100 MG: 100 CAPSULE ORAL at 14:50

## 2021-07-13 RX ADMIN — GABAPENTIN 100 MG: 100 CAPSULE ORAL at 20:34

## 2021-07-13 RX ADMIN — HYDROCODONE BITARTRATE AND ACETAMINOPHEN 1 TABLET: 5; 325 TABLET ORAL at 11:39

## 2021-07-13 RX ADMIN — SODIUM CHLORIDE, PRESERVATIVE FREE 10 ML: 5 INJECTION INTRAVENOUS at 20:37

## 2021-07-13 RX ADMIN — HYDROCODONE BITARTRATE AND ACETAMINOPHEN 1 TABLET: 5; 325 TABLET ORAL at 05:16

## 2021-07-13 RX ADMIN — ASPIRIN 81 MG: 81 TABLET, CHEWABLE ORAL at 09:27

## 2021-07-13 RX ADMIN — DOCUSATE SODIUM 100 MG: 100 CAPSULE, LIQUID FILLED ORAL at 09:27

## 2021-07-13 RX ADMIN — ENOXAPARIN SODIUM 80 MG: 80 INJECTION SUBCUTANEOUS at 20:37

## 2021-07-13 RX ADMIN — METOPROLOL TARTRATE 25 MG: 25 TABLET, FILM COATED ORAL at 09:27

## 2021-07-13 RX ADMIN — FUROSEMIDE 20 MG: 20 TABLET ORAL at 18:07

## 2021-07-13 RX ADMIN — WARFARIN SODIUM 5 MG: 2.5 TABLET ORAL at 18:08

## 2021-07-13 RX ADMIN — MAGNESIUM OXIDE 400 MG (241.3 MG MAGNESIUM) TABLET 200 MG: TABLET at 20:33

## 2021-07-13 RX ADMIN — POLYETHYLENE GLYCOL (3350) 17 G: 17 POWDER, FOR SOLUTION ORAL at 14:52

## 2021-07-13 RX ADMIN — GABAPENTIN 100 MG: 100 CAPSULE ORAL at 09:27

## 2021-07-13 RX ADMIN — METOPROLOL TARTRATE 25 MG: 25 TABLET, FILM COATED ORAL at 20:34

## 2021-07-13 ASSESSMENT — PAIN SCALES - GENERAL
PAINLEVEL_OUTOF10: 9
PAINLEVEL_OUTOF10: 9
PAINLEVEL_OUTOF10: 8
PAINLEVEL_OUTOF10: 8
PAINLEVEL_OUTOF10: 9
PAINLEVEL_OUTOF10: 9

## 2021-07-13 ASSESSMENT — PAIN DESCRIPTION - LOCATION: LOCATION: LEG

## 2021-07-13 ASSESSMENT — PAIN DESCRIPTION - PAIN TYPE: TYPE: ACUTE PAIN

## 2021-07-13 ASSESSMENT — PAIN DESCRIPTION - ORIENTATION: ORIENTATION: LOWER;LEFT

## 2021-07-13 ASSESSMENT — PAIN DESCRIPTION - DESCRIPTORS: DESCRIPTORS: ACHING;SHARP;SHOOTING

## 2021-07-13 ASSESSMENT — PAIN DESCRIPTION - ONSET: ONSET: ON-GOING

## 2021-07-13 ASSESSMENT — PAIN DESCRIPTION - FREQUENCY: FREQUENCY: CONTINUOUS

## 2021-07-13 NOTE — PROGRESS NOTES
ONCOLOGY HEMATOLOGY CARE ([facility])  PROGRESS NOTE      Patient was seen and examined today. Not in any acute distress and no overnight events. She is getting better today. She is on lovenox since 7/5/21 and she is tolerating it well. Her hemoglobin was 10.5 on 7/11/21. Her insurance doesn't cover pradaxa. Coumadin was started on 7/9/21. No new complaints. PHYSICAL EXAM    Vitals: BP (!) 108/54   Pulse 86   Temp 97.9 °F (36.6 °C) (Oral)   Resp 15   Ht 5' 10.98\" (1.803 m)   Wt 188 lb 9.6 oz (85.5 kg)   SpO2 97%   BMI 26.32 kg/m²   CONSTITUTIONAL: awake, alert, cooperative, no apparent distress   EYES: pupils equal, round and reactive to light, sclera clear and conjunctiva normal  ENT: Normocephalic, without obvious abnormality, atraumatic  NECK: supple, symmetrical, no jugular venous distension and no carotid bruits   HEMATOLOGIC/LYMPHATIC: no cervical, supraclavicular or axillary lymphadenopathy   LUNGS: VBS, no crackles,no rhonchi, no increased work of breathing, no wheezes, clear to auscultation,       CARDIOVASCULAR: regular rate and rhythm, normal S1 and S2, no murmur noted  ABDOMEN: normal bowel sounds x 4, soft, non-distended, non-tender, no masses palpated, no hepatosplenomgaly   MUSCULOSKELETAL: full range of motion noted, tone is normal  NEUROLOGIC: awake, alert, oriented to name, place and time. Motor skills grossly intact. SKIN: Normal skin color, texture, turgor and no jaundice. Skin appears intact   EXTREMITIES:  Left leg swelling, no clubbing, redness and tenderness +, no cyanosis,      LABORATORY RESULTS  CBC:   Recent Labs     07/11/21  0919   WBC 4.5   HGB 10.5*        BMP:    Recent Labs     07/10/21  0738      K 4.6      CO2 26   BUN 11   CREATININE 0.5*   GLUCOSE 92     Hepatic:   No results for input(s): AST, ALT, ALB, BILITOT, ALKPHOS in the last 72 hours.   INR:   Recent Labs     07/10/21  0738 07/11/21  0919 07/12/21  0814   INR 0.88 0.87 0.98 ASSESSMENT  Left lower extremity recurrent DVT     RECOMMENDATION  She was first diagnosed with left LE DVT ~ 2009 or 2010. She has been on coumadin until 8/17/2015 when she presented with supra therapeutic INR. Coumadin was stooped since 8/15/2015. She then had recurrent left LE DVT on 7/13/2016 and she has been on eliquis since then.      Factor V leiden and prothrombin gene mutation done on 817/2015 were negative. Homocysteine was mildly elevated.      Since she failed eliquis, we started lovenox since 7/5/21. We planned to switch it to pradaxa, however, her insurance doesn't cover pradaxa. Coumadin was started on 7/9/21. Will check INR and CBC tomorrow morning. Recommend discharge planning soon. 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      We will follow the patient.  Thank you No

## 2021-07-13 NOTE — PROGRESS NOTES
Hospitalist Progress Note      Name:  Joana Coleman /Age/Sex: 1940  (79 y.o. female)   MRN & CSN:  [id number] & [id number] Admission Date/Time: 2021  9:23 PM   Location:  [address] [place] [address]-A PCP: Jayson Clementsetto Marian Day: 10    Assessment and Plan:   Joana Coleman is a 79 y.o.  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 Hospital course complicated by left-sided chest pain. Active Problems:    DVT (deep vein thrombosis) in pregnancy    Precordial pain  Resolved Problems:    * No resolved hospital problems. *      #Right superficial femoral vein and popliteal vein DVT. Likely soft medication failure and not a compliance problem.  -Eliquis discontinued due to anticoagulation failure.  -Pradaxa not covered by patient's insurance.  -Started on Lovenox to bridge with warfarin.  -Patient will follow up with PCP for anticoagulation management.  -Appreciate oncology input. Patient will follow up with them as outpatient.  -Continue analgesics including gabapentin    #Precordial chest pain.  -Check stat EKG. -Serial troponin.  -Nitroglycerin sublingual as needed.  -Continue telemetry.  -Continue aspirin and metoprolol. #Lower extremities cellulitis.  -Completed 1 week course of IV vancomycin. #Essential hypertension. -BP is controlled on metoprolol. #COPD without exacerbation.  -Bronchodilator nebulizers as needed. #Moderate aortic stenosis.  -No evidence of CHF decompensation.  -Patient declined Lasix. #Insomnia on trazodone. Patient not tolerating melatonin. #Dysphagia.  [facility] evaluation by SLP appreciated. -Recommended regular diet with thin liquid and reflux precaution. Diet ADULT DIET; Regular; Low Fat/Low Chol/High Fiber/2 gm Na  Adult Oral Nutrition Supplement;  Low Calorie/High Protein Oral Supplement   DVT Prophylaxis [] Lovenox, []  Heparin, [] SCDs, [] Ambulation   GI Prophylaxis [] PPI,  [] H2 Blocker,  [] Carafate,  [] Diet/Tube Feeds   Code Status Full Code   Disposition Patient requires continued admission due to chest pain. MDM [] Low, [x] Moderate,[]  High  Patient's risk as above due to Chest pain     History of Present Illness:     Chief Complaint:  Chest pain this morning as well as leg pain. Danica Geronimo is a 79 y.o.  female  who presents with leg swelling and found to have right lower extremity DVT. Hospital course complicated by chest pain. Ten point ROS reviewed negative, unless as noted above    Objective:   No intake or output data in the 24 hours ending 07/13/21 1602   Vitals:   Vitals:    07/13/21 1554   BP: (!) 107/54   Pulse: 80   Resp:    Temp: 97.7 °F (36.5 °C)   SpO2: 97%     Physical Exam:   GEN Awake female, sitting upright in bed in no apparent distress. Appears given age. EYES  No scleral erythema, discharge, or conjunctivitis. HENT Mucous membranes are moist, no evidence of thrush. NECK Supple, no tenderness or masses. RESP Clear to auscultation, no wheezes, rales or rhonchi. Symmetric chest movement while on room air. No chest wall tenderness. CARDIO/VASC S1/S2 auscultated. Regular rate without appreciable murmurs, rubs, or gallops. No JVD or carotid bruits. Peripheral pulses equal bilaterally and palpable. No peripheral edema. GI Abdomen is soft without significant tenderness, masses, or guarding. Bowel sounds are normoactive. MSK No gross joint deformities. SKIN Normal coloration, warm, dry. NEURO Cranial nerves appear grossly intact, normal speech, no lateralizing weakness. PSYCH Awake, alert, oriented x 4. Affect appropriate.     Medications:   Medications:    docusate sodium  100 mg Oral Daily    albuterol sulfate HFA  2 puff Inhalation Q6H WA    warfarin  5 mg Oral Daily    metoprolol tartrate  25 mg Oral BID    gabapentin  100 mg Oral TID    traZODone  50 mg Oral Nightly    furosemide  20 mg Oral BID    aspirin  81 mg Oral Daily    magnesium oxide  200 mg Oral Daily    sodium chloride flush  5-40 mL Intravenous 2 times per day    famotidine  20 mg Oral BID    sodium chloride flush  5-40 mL Intravenous BID    enoxaparin  1 mg/kg Subcutaneous BID      Infusions:    sodium chloride Stopped (07/09/21 0328)     PRN Meds: melatonin, 3 mg, Nightly PRN  albuterol sulfate HFA, 2 puff, Q2H PRN  HYDROcodone-acetaminophen, 1 tablet, Q6H PRN  sodium chloride flush, 5-40 mL, PRN  sodium chloride, 25 mL, PRN  ondansetron, 4 mg, Q8H PRN   Or  ondansetron, 4 mg, Q6H PRN  polyethylene glycol, 17 g, Daily PRN  acetaminophen, 650 mg, Q6H PRN   Or  acetaminophen, 650 mg, Q6H PRN  potassium chloride, 40 mEq, PRN   Or  potassium alternative oral replacement, 40 mEq, PRN   Or  potassium chloride, 10 mEq, PRN  magnesium sulfate, 2,000 mg, PRN          Electronically signed by Jillian Pisano MD on 7/13/2021 at 4:02 PM

## 2021-07-13 NOTE — PROGRESS NOTES
ONCOLOGY HEMATOLOGY CARE ([facility])  PROGRESS NOTE      Patient was seen and examined today. Not in any acute distress and no overnight events. She is getting better today. She is on lovenox since 7/5/21 and she is tolerating it well. Her hemoglobin was 10.5 on 7/11/21. Her insurance doesn't cover pradaxa. Coumadin was started on 7/9/21. Today blood test is still pending. Discussed with her about discharging home with lovenox and comadin. She agreed but she prefers to be discharged home tomorrow since there is no one to pick her up today. No new complaints. PHYSICAL EXAM    Vitals: /65   Pulse 76   Temp 98 °F (36.7 °C) (Oral)   Resp 19   Ht 5' 10.98\" (1.803 m)   Wt 188 lb 9.6 oz (85.5 kg)   SpO2 100%   BMI 26.32 kg/m²   CONSTITUTIONAL: awake, alert, cooperative, no apparent distress   EYES: pupils equal, round and reactive to light, sclera clear and conjunctiva normal  ENT: Normocephalic, without obvious abnormality, atraumatic  NECK: supple, symmetrical, no jugular venous distension and no carotid bruits   HEMATOLOGIC/LYMPHATIC: no cervical, supraclavicular or axillary lymphadenopathy   LUNGS: VBS, no crackles, no wheezes, clear to auscultation, no rhonchi, no increased work of breathing,       CARDIOVASCULAR: regular rate and rhythm, normal S1 and S2, no murmur noted  ABDOMEN: normal bowel sounds x 4, soft, non-distended, non-tender, no masses palpated, no hepatosplenomgaly   MUSCULOSKELETAL: full range of motion noted, tone is normal  NEUROLOGIC: awake, alert, oriented to name, place and time. Motor skills grossly intact. SKIN: Normal skin color, texture, turgor and no jaundice.  Skin appears intact   EXTREMITIES:  Left leg swelling, no cyanosis, no clubbing, redness and tenderness +,       LABORATORY RESULTS  CBC:   Recent Labs     07/11/21  0919   WBC 4.5   HGB 10.5*        BMP:    No results for input(s): NA, K, CL, CO2, BUN, CREATININE, GLUCOSE in the last 72 hours. Hepatic:   No results for input(s): AST, ALT, ALB, BILITOT, ALKPHOS in the last 72 hours. INR:   Recent Labs     07/11/21  0919 07/12/21  0814   INR 0.87 0.98     ASSESSMENT  Left lower extremity recurrent DVT     RECOMMENDATION  She was first diagnosed with left LE DVT ~ 2009 or 2010. She has been on coumadin until 8/17/2015 when she presented with supra therapeutic INR. Coumadin was stooped since 8/15/2015. She then had recurrent left LE DVT on 7/13/2016 and she has been on eliquis since then.      Factor V leiden and prothrombin gene mutation done on 817/2015 were negative. Homocysteine was mildly elevated.      Since she failed eliquis, we started lovenox since 7/5/21. We planned to switch it to pradaxa, however, her insurance doesn't cover pradaxa. Coumadin was started on 7/9/21. Today blood test is still pending. Discussed with her about discharging home with lovenox and comadin. She agreed but she prefers to be discharged home tomorrow since there is no one to pick her up today. 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      We will follow the patient.  Thank you

## 2021-07-13 NOTE — RT PROTOCOL NOTE
RT Inhaler-Nebulizer Bronchodilator Protocol Note    There is a bronchodilator order in the chart from a provider indicating to follow the RT Bronchodilator Protocol and there is an Initiate RT Bronchodilator Protocol order as well (see protocol at bottom of note). The findings from the last RT Protocol Assessment were as follows:  Smoking: <15 Pack years  Surgical Status: No surgery  Xray: X-ray not available  Respiratory Pattern: RR 12-20  Mental Status: Alert and Oriented  Breath Sounds: Diminished and/or crackles  Cough: Strong, spontaneous, non-productive  Activity Level: Walking with assistance  Oxygen Requirement: Room Air - 2LNC/28% or home setting  Indication for Bronchodilator Therapy: On home bronchodilators  Bronchodilator Assessment Score: 2    Aerosolized bronchodilator medication orders have been revised according to the RT Bronchodilator Protocol. RT Inhaler-Nebulizer Bronchodilator Protocol:    Respiratory Therapist to perform RT Therapy Protocol Assessment then follow the protocol. No Indications - adjust the frequency to every 6 hours PRN wheezing or bronchospasm, if no treatments needed after 48 hours then discontinue using Per Protocol order mode. If indication present, adjust the RT bronchodilator orders based on the Bronchodilator Assessment Score as follows:    0-6 - enter or revise RT bronchodilator order to Albuterol Inhaler order with frequency of every 2 hours PRN for wheezing or increased work of breathing using Per Protocol order mode. If Albuterol Inhaler not tolerated or not effective, then discontinue the Albuterol Inhaler order and enter Albuterol Nebulizer order with same frequency and PRN reasons. Repeat RT Therapy Protocol Assessment as needed.     7-10 - discontinue any other Inpatient aerosolized bronchodilator medication orders and enter or revise two Albuterol Inhaler orders, one with BID frequency and one with frequency of every 2 hours PRN wheezing or increased work of breathing using Per Protocol order mode. Repeat RT Therapy Protocol Assessment with second treatment then BID and as needed. If Albuterol Inhaler not tolerated or not effective, then discontinue the Albuterol Inhaler orders and enter two Albuterol Nebulizer orders with same frequencies and PRN reasons. 11-13 - discontinue any other Inpatient aerosolized bronchodilator medication orders and enter DuoNeb Nebulizer orders QID frequency and an Albuterol Nebulizer order every 2 hours PRN wheezing or increased work of breathing using Per Protocol order mode. Repeat RT Therapy Protocol Assessment with second treatment then QID and as needed. Greater than 13 - discontinue any other Inpatient bronchodilator aerosolized medication orders and enter DuoNeb Nebulizer order every 4 hours frequency and Albuterol Nebulizer every 2 hours PRN wheezing or increased work of breathing using Per Protocol order mode. Repeat RT Therapy Protocol Assessment with second treatment then every 4 hours and as needed. RT to enter RT Home Evaluation for COPD & MDI Assessment order using Per Protocol order mode. On home treatments.     Electronically signed by Elayne Johnson RCP on 7/13/2021 at 7:29 AM

## 2021-07-13 NOTE — PLAN OF CARE
Problem: Pain:  Goal: Pain level will decrease  Description: Pain level will decrease  7/13/2021 0443 by Lalo Chinchilla RN  Outcome: Ongoing  7/13/2021 0437 by Lalo Chinchilla RN  Outcome: Ongoing  Goal: Control of acute pain  Description: Control of acute pain  7/13/2021 0443 by Lalo Chinchilla RN  Outcome: Ongoing  7/13/2021 0437 by Lalo Chinchilla RN  Outcome: Ongoing  Goal: Control of chronic pain  Description: Control of chronic pain  7/13/2021 0443 by Lalo Chinchilla RN  Outcome: Ongoing  7/13/2021 0437 by Lalo Chinchilla RN  Outcome: Ongoing

## 2021-07-14 VITALS
BODY MASS INDEX: 26.4 KG/M2 | TEMPERATURE: 98.4 F | HEIGHT: 71 IN | WEIGHT: 188.6 LBS | SYSTOLIC BLOOD PRESSURE: 130 MMHG | RESPIRATION RATE: 16 BRPM | DIASTOLIC BLOOD PRESSURE: 62 MMHG | HEART RATE: 84 BPM | OXYGEN SATURATION: 95 %

## 2021-07-14 LAB
EKG ATRIAL RATE: 82 BPM
EKG DIAGNOSIS: NORMAL
EKG P AXIS: 52 DEGREES
EKG P-R INTERVAL: 178 MS
EKG Q-T INTERVAL: 414 MS
EKG QRS DURATION: 96 MS
EKG QTC CALCULATION (BAZETT): 483 MS
EKG R AXIS: 18 DEGREES
EKG T AXIS: 53 DEGREES
EKG VENTRICULAR RATE: 82 BPM
INR BLD: 1.21 INDEX
PROTHROMBIN TIME: 15.7 SECONDS (ref 11.7–14.5)
TROPONIN T: <0.01 NG/ML
TROPONIN T: <0.01 NG/ML

## 2021-07-14 PROCEDURE — 6370000000 HC RX 637 (ALT 250 FOR IP): Performed by: NURSE PRACTITIONER

## 2021-07-14 PROCEDURE — 6360000002 HC RX W HCPCS: Performed by: INTERNAL MEDICINE

## 2021-07-14 PROCEDURE — 93010 ELECTROCARDIOGRAM REPORT: CPT | Performed by: INTERNAL MEDICINE

## 2021-07-14 PROCEDURE — 84484 ASSAY OF TROPONIN QUANT: CPT

## 2021-07-14 PROCEDURE — 85610 PROTHROMBIN TIME: CPT

## 2021-07-14 PROCEDURE — 6370000000 HC RX 637 (ALT 250 FOR IP): Performed by: INTERNAL MEDICINE

## 2021-07-14 PROCEDURE — 6370000000 HC RX 637 (ALT 250 FOR IP): Performed by: HOSPITALIST

## 2021-07-14 PROCEDURE — 94640 AIRWAY INHALATION TREATMENT: CPT

## 2021-07-14 PROCEDURE — 94761 N-INVAS EAR/PLS OXIMETRY MLT: CPT

## 2021-07-14 PROCEDURE — 36415 COLL VENOUS BLD VENIPUNCTURE: CPT

## 2021-07-14 RX ORDER — WARFARIN SODIUM 2.5 MG/1
TABLET ORAL
Qty: 4 TABLET | Refills: 3 | Status: ON HOLD | OUTPATIENT
Start: 2021-07-14 | End: 2021-12-18 | Stop reason: HOSPADM

## 2021-07-14 RX ORDER — ONDANSETRON 4 MG/1
4 TABLET, ORALLY DISINTEGRATING ORAL EVERY 8 HOURS PRN
Qty: 10 TABLET | Refills: 0 | Status: SHIPPED | OUTPATIENT
Start: 2021-07-14

## 2021-07-14 RX ORDER — TRAZODONE HYDROCHLORIDE 50 MG/1
50 TABLET ORAL NIGHTLY
Qty: 30 TABLET | Refills: 0 | Status: SHIPPED | OUTPATIENT
Start: 2021-07-14

## 2021-07-14 RX ADMIN — FUROSEMIDE 20 MG: 20 TABLET ORAL at 09:45

## 2021-07-14 RX ADMIN — ALBUTEROL SULFATE 2 PUFF: 90 AEROSOL, METERED RESPIRATORY (INHALATION) at 07:51

## 2021-07-14 RX ADMIN — ASPIRIN 81 MG: 81 TABLET, CHEWABLE ORAL at 09:45

## 2021-07-14 RX ADMIN — GABAPENTIN 100 MG: 100 CAPSULE ORAL at 09:45

## 2021-07-14 RX ADMIN — ENOXAPARIN SODIUM 80 MG: 80 INJECTION SUBCUTANEOUS at 09:46

## 2021-07-14 RX ADMIN — FAMOTIDINE 20 MG: 20 TABLET ORAL at 09:45

## 2021-07-14 RX ADMIN — HYDROCODONE BITARTRATE AND ACETAMINOPHEN 1 TABLET: 5; 325 TABLET ORAL at 09:46

## 2021-07-14 RX ADMIN — DOCUSATE SODIUM 100 MG: 100 CAPSULE, LIQUID FILLED ORAL at 09:45

## 2021-07-14 RX ADMIN — HYDROCODONE BITARTRATE AND ACETAMINOPHEN 1 TABLET: 5; 325 TABLET ORAL at 01:40

## 2021-07-14 RX ADMIN — METOPROLOL TARTRATE 25 MG: 25 TABLET, FILM COATED ORAL at 09:45

## 2021-07-14 ASSESSMENT — PAIN DESCRIPTION - PROGRESSION
CLINICAL_PROGRESSION: GRADUALLY WORSENING

## 2021-07-14 ASSESSMENT — PAIN SCALES - GENERAL
PAINLEVEL_OUTOF10: 9
PAINLEVEL_OUTOF10: 7

## 2021-07-14 ASSESSMENT — PAIN DESCRIPTION - LOCATION: LOCATION: LEG

## 2021-07-14 ASSESSMENT — PAIN DESCRIPTION - PAIN TYPE: TYPE: ACUTE PAIN

## 2021-07-14 ASSESSMENT — PAIN DESCRIPTION - FREQUENCY: FREQUENCY: CONTINUOUS

## 2021-07-14 ASSESSMENT — PAIN DESCRIPTION - ORIENTATION: ORIENTATION: LEFT;LOWER

## 2021-07-14 ASSESSMENT — PAIN DESCRIPTION - DESCRIPTORS: DESCRIPTORS: ACHING

## 2021-07-14 ASSESSMENT — PAIN DESCRIPTION - ONSET: ONSET: ON-GOING

## 2021-07-14 NOTE — PROGRESS NOTES
Attempted to schedule hospital visit follow up. No answer at the office, patient will be informed of the importance of scheduling the follow up.

## 2021-07-14 NOTE — DISCHARGE SUMMARY
Discharge Summary    Name:  Linda Woods /Age/Sex: 1940  (79 y.o. female)   MRN & CSN:  [id number] & [id number] Admission Date/Time: 2021  9:23 PM   Attending:  Warren Pressley MD Discharging Physician: Warren Pressley MD     Hospital Course:   Linda Woods is a 79 y.o.  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 This was thought to be a case of Eliquis failure and not a compliance issue. Hematologist was consulted and attempt to switch patient to Pradaxa was unsuccessful because insurance could not cover the cost.  She was started on warfarin with Lovenox bridge. She will follow up with her PCP for INR monitoring. Hospital course was complicated by left-sided chest pain. EKG was unremarkable for any evidence of acute ischemic changes and serial troponin was negative. Symptoms resolved and she will continue her home dose of aspirin and metoprolol. Patient was also noted to have lower extremities cellulitis for which she will complete a 2-week course of IV vancomycin. She did report dysphagia and had swallow evaluation by SLP. She was recommended to have regular diet with thin liquid as well as reflux precaution. Patient was comfortable to discharge home. All other comorbidities were stable during her hospital stay. The patient expressed appropriate understanding of and agreement with the discharge recommendations, medications, and plan. Consults this admission:  IP CONSULT TO HOSPITALIST  PHARMACY TO DOSE VANCOMYCIN  IP CONSULT TO HEM/ONC  IP CONSULT TO HOME CARE NEEDS  IP CONSULT TO HOME CARE NEEDS  IP CONSULT TO IV TEAM    Discharge Instruction:   Follow up appointments: Follow-up with PCP.   Primary care physician:  within 2 weeks    Diet:  regular diet   Activity: activity as tolerated  Disposition: Discharged to:   [x]Home, [x]HHC, []SNF, []Acute Rehab, []Hospice   Condition on discharge: Stable    Discharge Medications:      Manohar Jon   Home Medication Instructions NBX:[id number]    Printed on:07/14/21 0911   Medication Information                      albuterol sulfate  (90 Base) MCG/ACT inhaler  Inhale 2 puffs into the lungs every 6 hours as needed for Wheezing             aspirin 81 MG tablet  Take 81 mg by mouth daily. enoxaparin (LOVENOX) 80 MG/0.8ML injection  Inject 0.8 mLs into the skin 2 times daily             furosemide (LASIX) 20 MG tablet  Take 20 mg by mouth 2 times daily. HYDROcodone-acetaminophen (NORCO) 5-325 MG per tablet  Take 1 tablet by mouth every 6 hours as needed for Pain.             magnesium 30 MG tablet  Take 30 mg by mouth daily             metoprolol tartrate (LOPRESSOR) 25 MG tablet  Take 1 tablet by mouth 2 times daily             ondansetron (ZOFRAN-ODT) 4 MG disintegrating tablet  Take 1 tablet by mouth every 8 hours as needed for Nausea or Vomiting             potassium chloride (KLOR-CON) 20 MEQ packet  Take 40 mEq by mouth daily             traZODone (DESYREL) 50 MG tablet  Take 1 tablet by mouth nightly             warfarin (COUMADIN) 2.5 MG tablet  Take 2 tablets daily at 5 PM and follow up with your PCP for INR on Friday 7/16/2021                 Objective Findings at Discharge:   /62   Pulse 84   Temp 98.4 °F (36.9 °C) (Oral)   Resp 16   Ht 5' 10.98\" (1.803 m)   Wt 188 lb 9.6 oz (85.5 kg)   SpO2 95%   BMI 26.32 kg/m²            PHYSICAL EXAM   GEN    Awake female, sitting upright in bed in no apparent distress. Appears given age. EYES  No scleral erythema, discharge, or conjunctivitis. HENT  Mucous membranes are moist, no evidence of thrush. NECK  Supple, no tenderness or masses. RESP  Clear to auscultation, no wheezes, rales or rhonchi. Symmetric chest movement while on room air. No chest wall tenderness. CARDIO/VASC           S1/S2 auscultated.  Regular rate without appreciable murmurs, rubs, or gallops. No JVD or carotid bruits. Peripheral pulses equal bilaterally and palpable. No peripheral edema. GI        Abdomen is soft without significant tenderness, masses, or guarding. Bowel sounds are normoactive. MSK    No gross joint deformities. SKIN    Normal coloration, warm, dry. NEURO           Cranial nerves appear grossly intact, normal speech, no lateralizing weakness. PSYCH            Awake, alert, oriented x 4. Affect appropriate. BMP/CBC  Recent Labs     07/11/21  0919 07/13/21  0854   WBC 4.5 4.1   HCT 33.7* 35.9*    209       IMAGING:  CTA PULMONARY W CONTRAST [[id number]] Collected: 07/05/21 0505      Order Status: Completed Updated: 07/05/21 0511     Narrative:       EXAMINATION:   CTA OF THE CHEST 7/5/2021 3:31 am     TECHNIQUE:   CTA of the chest was performed after the administration of intravenous   contrast.  Multiplanar reformatted images are provided for review.  MIP   images are provided for review. Dose modulation, iterative reconstruction,   and/or weight based adjustment of the mA/kV was utilized to reduce the   radiation dose to as low as reasonably achievable. COMPARISON:   02/10/2021     HISTORY:   ORDERING SYSTEM PROVIDED HISTORY: shortness of breath, evaluate for worsening   or new PE   TECHNOLOGIST PROVIDED HISTORY:   Reason for exam:->shortness of breath, evaluate for worsening or new PE   Decision Support Exception - unselect if not a suspected or confirmed   emergency medical condition->Emergency Medical Condition (MA)   Reason for Exam: sob, leg pain   Acuity: Acute   Type of Exam: Initial     FINDINGS:   Motion limited study     Pulmonary Arteries: Redemonstrated segmental filling defect within left lower   lobe pulmonary artery.  Otherwise no gross evidence of pulmonary emboli   although evaluation quite limited due to motion artifact.  Normal caliber   main pulmonary artery.      Mediastinum: Normal heart size.  No pericardial effusion.  No mediastinal,   hilar, or axillary lymphadenopathy.  Normal caliber thoracic aorta. Lungs/pleura: No focal consolidation, pleural effusion, or pneumothorax. Upper Abdomen: No acute abnormality in the upper abdomen. Soft Tissues/Bones: No acute bone or soft tissue abnormality.      Impression:       Motion limited study. Redemonstrated segmental filling defect within left lower lobe pulmonary   artery. Otherwise no gross evidence of pulmonary emboli although evaluation   quite limited due to motion artifact.  Normal caliber main pulmonary artery.      VL DUP LOWER EXTREMITY VENOUS BILATERAL [[id number]] Collected: 07/05/21 0021     Order Status: Completed Updated: 07/05/21 0028     Narrative:       EXAMINATION:   DUPLEX VENOUS ULTRASOUND OF THE BILATERAL LOWER EXTREMITIES, 7/4/2021 11:07 pm     TECHNIQUE:   Duplex ultrasound using B-mode/gray scaled imaging and Doppler spectral   analysis and color flow was obtained of the bilateral lower extremities. COMPARISON:   06/15/2019     HISTORY:   ORDERING SYSTEM PROVIDED HISTORY: bilateral lower extremity pain and   swelling, history of DVT, rule out DVT   TECHNOLOGIST PROVIDED HISTORY:   Reason for exam:->bilateral lower extremity pain and swelling, history of   DVT, rule out DVT   Reason for Exam: BLE pain and edema   Acuity: Chronic   Type of Exam: Ongoing   Additional signs and symptoms: Multiple previous.  Hx DVT     FINDINGS:   Below knee veins are not visualized due to subcutaneous edema. Otherwise no evidence of DVT within the left lower extremity.  Normal color   flow and compressibility of the visualized veins on the left side.      In the right lower extremity, there is lack of color flow and   noncompressibility involving the superficial femoral vein and popliteal vein   indicating DVT which may be acute on chronic.  No DVT within the right common   femoral vein.      Impression:       DVT within the right superficial femoral vein and popliteal vein which may be   acute on chronic. No evidence of DVT within the left lower extremity.      Nondiagnostic evaluation below the knees bilaterally due to edema.              Discharge Time of 37 minutes    Electronically signed by Monique Concepcion MD on 7/14/2021 at 9:11 AM

## 2021-07-14 NOTE — CARE COORDINATION
Pt on discharge, notified Rocio Suarez at [facility] of pt's discharge and faxed TERESITAS and Ezequiel Luz orders to [phone]. Pt can discharge from Case Management standpoint.

## 2021-07-14 NOTE — PLAN OF CARE
Problem: Pain:  Goal: Pain level will decrease  Description: Pain level will decrease  Outcome: Ongoing  Goal: Control of acute pain  Description: Control of acute pain  Outcome: Ongoing  Goal: Control of chronic pain  Description: Control of chronic pain  Outcome: Ongoing     Problem: Nutrition  Goal: Optimal nutrition therapy  Outcome: Ongoing     Problem: Falls - Risk of:  Goal: Will remain free from falls  Description: Will remain free from falls  Outcome: Ongoing  Goal: Absence of physical injury  Description: Absence of physical injury  Outcome: Ongoing     Problem: Safety:  Goal: Free from accidental physical injury  Description: Free from accidental physical injury  Outcome: Ongoing  Goal: Free from intentional harm  Description: Free from intentional harm  Outcome: Ongoing     Problem: Daily Care:  Goal: Daily care needs are met  Description: Daily care needs are met  Outcome: Ongoing     Problem: Discharge Planning:  Goal: Patients continuum of care needs are met  Description: Patients continuum of care needs are met  Outcome: Ongoing

## 2021-07-20 ENCOUNTER — TELEPHONE (OUTPATIENT)
Dept: ONCOLOGY | Age: 81
End: 2021-07-20

## 2021-07-29 ENCOUNTER — TELEPHONE (OUTPATIENT)
Dept: CARDIOLOGY CLINIC | Age: 81
End: 2021-07-29

## 2021-12-16 PROCEDURE — 99283 EMERGENCY DEPT VISIT LOW MDM: CPT

## 2021-12-16 RX ORDER — LANOLIN ALCOHOL/MO/W.PET/CERES
3 CREAM (GRAM) TOPICAL DAILY
COMMUNITY

## 2021-12-16 ASSESSMENT — PAIN SCALES - GENERAL: PAINLEVEL_OUTOF10: 10

## 2021-12-16 ASSESSMENT — PAIN DESCRIPTION - LOCATION: LOCATION: LEG

## 2021-12-16 ASSESSMENT — PAIN DESCRIPTION - ORIENTATION: ORIENTATION: LEFT;RIGHT

## 2021-12-17 ENCOUNTER — APPOINTMENT (OUTPATIENT)
Dept: CT IMAGING | Age: 81
End: 2021-12-17
Payer: COMMERCIAL

## 2021-12-17 ENCOUNTER — APPOINTMENT (OUTPATIENT)
Dept: ULTRASOUND IMAGING | Age: 81
End: 2021-12-17
Payer: COMMERCIAL

## 2021-12-17 ENCOUNTER — APPOINTMENT (OUTPATIENT)
Dept: INTERVENTIONAL RADIOLOGY/VASCULAR | Age: 81
End: 2021-12-17
Payer: COMMERCIAL

## 2021-12-17 ENCOUNTER — HOSPITAL ENCOUNTER (OUTPATIENT)
Age: 81
Setting detail: OBSERVATION
Discharge: HOME | End: 2021-12-18
Attending: EMERGENCY MEDICINE | Admitting: INTERNAL MEDICINE
Payer: COMMERCIAL

## 2021-12-17 ENCOUNTER — APPOINTMENT (OUTPATIENT)
Dept: GENERAL RADIOLOGY | Age: 81
End: 2021-12-17
Payer: COMMERCIAL

## 2021-12-17 DIAGNOSIS — I82.431 ACUTE DEEP VEIN THROMBOSIS (DVT) OF POPLITEAL VEIN OF RIGHT LOWER EXTREMITY (HCC): Primary | ICD-10-CM

## 2021-12-17 DIAGNOSIS — I82.A12 DVT OF AXILLARY VEIN, ACUTE LEFT (HCC): ICD-10-CM

## 2021-12-17 LAB
ALBUMIN SERPL-MCNC: 3.9 GM/DL (ref 3.4–5)
ALP BLD-CCNC: 65 IU/L (ref 40–128)
ALT SERPL-CCNC: <5 U/L (ref 10–40)
ANION GAP SERPL CALCULATED.3IONS-SCNC: 10 MMOL/L (ref 4–16)
APTT: 39.6 SECONDS (ref 25.1–37.1)
AST SERPL-CCNC: 14 IU/L (ref 15–37)
BASOPHILS ABSOLUTE: 0.1 K/CU MM
BASOPHILS RELATIVE PERCENT: 1.1 % (ref 0–1)
BILIRUB SERPL-MCNC: 0.8 MG/DL (ref 0–1)
BUN BLDV-MCNC: 12 MG/DL (ref 6–23)
CALCIUM SERPL-MCNC: 9.3 MG/DL (ref 8.3–10.6)
CHLORIDE BLD-SCNC: 100 MMOL/L (ref 99–110)
CO2: 29 MMOL/L (ref 21–32)
CREAT SERPL-MCNC: 0.6 MG/DL (ref 0.6–1.1)
DIFFERENTIAL TYPE: ABNORMAL
EKG ATRIAL RATE: 77 BPM
EKG DIAGNOSIS: NORMAL
EKG P AXIS: 53 DEGREES
EKG P-R INTERVAL: 174 MS
EKG Q-T INTERVAL: 420 MS
EKG QRS DURATION: 100 MS
EKG QTC CALCULATION (BAZETT): 475 MS
EKG R AXIS: 51 DEGREES
EKG T AXIS: 68 DEGREES
EKG VENTRICULAR RATE: 77 BPM
EOSINOPHILS ABSOLUTE: 0.4 K/CU MM
EOSINOPHILS RELATIVE PERCENT: 7.1 % (ref 0–3)
GFR AFRICAN AMERICAN: >60 ML/MIN/1.73M2
GFR NON-AFRICAN AMERICAN: >60 ML/MIN/1.73M2
GLUCOSE BLD-MCNC: 86 MG/DL (ref 70–99)
HCT VFR BLD CALC: 37.8 % (ref 37–47)
HEMOGLOBIN: 11.5 GM/DL (ref 12.5–16)
IMMATURE NEUTROPHIL %: 0.4 % (ref 0–0.43)
INR BLD: 0.96 INDEX
LYMPHOCYTES ABSOLUTE: 1.4 K/CU MM
LYMPHOCYTES RELATIVE PERCENT: 26.2 % (ref 24–44)
MAGNESIUM: 1.4 MG/DL (ref 1.8–2.4)
MCH RBC QN AUTO: 30.7 PG (ref 27–31)
MCHC RBC AUTO-ENTMCNC: 30.4 % (ref 32–36)
MCV RBC AUTO: 101.1 FL (ref 78–100)
MONOCYTES ABSOLUTE: 0.6 K/CU MM
MONOCYTES RELATIVE PERCENT: 12.1 % (ref 0–4)
NUCLEATED RBC %: 0 %
PDW BLD-RTO: 15.3 % (ref 11.7–14.9)
PLATELET # BLD: 180 K/CU MM (ref 140–440)
PMV BLD AUTO: 10.3 FL (ref 7.5–11.1)
POTASSIUM SERPL-SCNC: 3.2 MMOL/L (ref 3.5–5.1)
PRO-BNP: 2251 PG/ML
PROTHROMBIN TIME: 12.4 SECONDS (ref 11.7–14.5)
RBC # BLD: 3.74 M/CU MM (ref 4.2–5.4)
SARS-COV-2, NAAT: NOT DETECTED
SEGMENTED NEUTROPHILS ABSOLUTE COUNT: 2.8 K/CU MM
SEGMENTED NEUTROPHILS RELATIVE PERCENT: 53.1 % (ref 36–66)
SODIUM BLD-SCNC: 139 MMOL/L (ref 135–145)
SOURCE: NORMAL
TOTAL IMMATURE NEUTOROPHIL: 0.02 K/CU MM
TOTAL NUCLEATED RBC: 0 K/CU MM
TOTAL PROTEIN: 7.1 GM/DL (ref 6.4–8.2)
TROPONIN T: <0.01 NG/ML
WBC # BLD: 5.2 K/CU MM (ref 4–10.5)

## 2021-12-17 PROCEDURE — 99233 SBSQ HOSP IP/OBS HIGH 50: CPT | Performed by: INTERNAL MEDICINE

## 2021-12-17 PROCEDURE — 6370000000 HC RX 637 (ALT 250 FOR IP): Performed by: INTERNAL MEDICINE

## 2021-12-17 PROCEDURE — 93010 ELECTROCARDIOGRAM REPORT: CPT | Performed by: INTERNAL MEDICINE

## 2021-12-17 PROCEDURE — C1894 INTRO/SHEATH, NON-LASER: HCPCS

## 2021-12-17 PROCEDURE — 76499 UNLISTED DX RADIOGRAPHIC PX: CPT

## 2021-12-17 PROCEDURE — G0378 HOSPITAL OBSERVATION PER HR: HCPCS

## 2021-12-17 PROCEDURE — 84484 ASSAY OF TROPONIN QUANT: CPT

## 2021-12-17 PROCEDURE — 96372 THER/PROPH/DIAG INJ SC/IM: CPT

## 2021-12-17 PROCEDURE — 6360000004 HC RX CONTRAST MEDICATION: Performed by: NURSE PRACTITIONER

## 2021-12-17 PROCEDURE — 83880 ASSAY OF NATRIURETIC PEPTIDE: CPT

## 2021-12-17 PROCEDURE — 85730 THROMBOPLASTIN TIME PARTIAL: CPT

## 2021-12-17 PROCEDURE — 71045 X-RAY EXAM CHEST 1 VIEW: CPT

## 2021-12-17 PROCEDURE — 85025 COMPLETE CBC W/AUTO DIFF WBC: CPT

## 2021-12-17 PROCEDURE — G0378 HOSPITAL OBSERVATION PER HR: HCPCS | Performed by: NURSE PRACTITIONER

## 2021-12-17 PROCEDURE — 2580000003 HC RX 258: Performed by: INTERNAL MEDICINE

## 2021-12-17 PROCEDURE — 80053 COMPREHEN METABOLIC PANEL: CPT

## 2021-12-17 PROCEDURE — 2709999900 HC NON-CHARGEABLE SUPPLY

## 2021-12-17 PROCEDURE — 83735 ASSAY OF MAGNESIUM: CPT

## 2021-12-17 PROCEDURE — 6360000002 HC RX W HCPCS: Performed by: EMERGENCY MEDICINE

## 2021-12-17 PROCEDURE — 6370000000 HC RX 637 (ALT 250 FOR IP): Performed by: NURSE PRACTITIONER

## 2021-12-17 PROCEDURE — 93005 ELECTROCARDIOGRAM TRACING: CPT | Performed by: EMERGENCY MEDICINE

## 2021-12-17 PROCEDURE — 93970 EXTREMITY STUDY: CPT

## 2021-12-17 PROCEDURE — 73706 CT ANGIO LWR EXTR W/O&W/DYE: CPT

## 2021-12-17 PROCEDURE — 36005 INJECTION EXT VENOGRAPHY: CPT

## 2021-12-17 PROCEDURE — 87635 SARS-COV-2 COVID-19 AMP PRB: CPT

## 2021-12-17 PROCEDURE — 99220 PR INITIAL OBSERVATION CARE/DAY 70 MINUTES: CPT | Performed by: INTERNAL MEDICINE

## 2021-12-17 PROCEDURE — 75820 VEIN X-RAY ARM/LEG: CPT

## 2021-12-17 PROCEDURE — 6360000002 HC RX W HCPCS: Performed by: INTERNAL MEDICINE

## 2021-12-17 PROCEDURE — 85610 PROTHROMBIN TIME: CPT

## 2021-12-17 RX ORDER — ACETAMINOPHEN 650 MG/1
650 SUPPOSITORY RECTAL EVERY 6 HOURS PRN
Status: DISCONTINUED | OUTPATIENT
Start: 2021-12-17 | End: 2021-12-18 | Stop reason: HOSPADM

## 2021-12-17 RX ORDER — METOPROLOL TARTRATE 50 MG/1
50 TABLET, FILM COATED ORAL 2 TIMES DAILY
Status: DISCONTINUED | OUTPATIENT
Start: 2021-12-17 | End: 2021-12-18 | Stop reason: HOSPADM

## 2021-12-17 RX ORDER — ASPIRIN 81 MG/1
81 TABLET ORAL DAILY
Status: DISCONTINUED | OUTPATIENT
Start: 2021-12-17 | End: 2021-12-18 | Stop reason: HOSPADM

## 2021-12-17 RX ORDER — SODIUM CHLORIDE 9 MG/ML
25 INJECTION, SOLUTION INTRAVENOUS PRN
Status: DISCONTINUED | OUTPATIENT
Start: 2021-12-17 | End: 2021-12-18 | Stop reason: HOSPADM

## 2021-12-17 RX ORDER — MAGNESIUM OXIDE 400 MG/1
400 TABLET ORAL 3 TIMES DAILY
Status: DISCONTINUED | OUTPATIENT
Start: 2021-12-17 | End: 2021-12-18 | Stop reason: HOSPADM

## 2021-12-17 RX ORDER — HYDROCODONE BITARTRATE AND ACETAMINOPHEN 5; 325 MG/1; MG/1
1 TABLET ORAL EVERY 4 HOURS PRN
Status: DISCONTINUED | OUTPATIENT
Start: 2021-12-17 | End: 2021-12-18 | Stop reason: HOSPADM

## 2021-12-17 RX ORDER — POTASSIUM CHLORIDE 20 MEQ/1
40 TABLET, EXTENDED RELEASE ORAL 2 TIMES DAILY WITH MEALS
Status: DISCONTINUED | OUTPATIENT
Start: 2021-12-17 | End: 2021-12-18 | Stop reason: HOSPADM

## 2021-12-17 RX ORDER — SODIUM CHLORIDE 0.9 % (FLUSH) 0.9 %
10 SYRINGE (ML) INJECTION EVERY 12 HOURS SCHEDULED
Status: DISCONTINUED | OUTPATIENT
Start: 2021-12-17 | End: 2021-12-18 | Stop reason: HOSPADM

## 2021-12-17 RX ORDER — SODIUM CHLORIDE 0.9 % (FLUSH) 0.9 %
10 SYRINGE (ML) INJECTION PRN
Status: DISCONTINUED | OUTPATIENT
Start: 2021-12-17 | End: 2021-12-18 | Stop reason: HOSPADM

## 2021-12-17 RX ORDER — ALBUTEROL SULFATE 90 UG/1
2 AEROSOL, METERED RESPIRATORY (INHALATION) EVERY 6 HOURS PRN
Status: DISCONTINUED | OUTPATIENT
Start: 2021-12-17 | End: 2021-12-18 | Stop reason: HOSPADM

## 2021-12-17 RX ORDER — HYDROCODONE BITARTRATE AND ACETAMINOPHEN 5; 325 MG/1; MG/1
1 TABLET ORAL EVERY 6 HOURS PRN
Status: DISCONTINUED | OUTPATIENT
Start: 2021-12-17 | End: 2021-12-17

## 2021-12-17 RX ORDER — TRAZODONE HYDROCHLORIDE 50 MG/1
50 TABLET ORAL NIGHTLY
Status: DISCONTINUED | OUTPATIENT
Start: 2021-12-17 | End: 2021-12-18 | Stop reason: HOSPADM

## 2021-12-17 RX ORDER — POLYETHYLENE GLYCOL 3350 17 G/17G
17 POWDER, FOR SOLUTION ORAL DAILY PRN
Status: DISCONTINUED | OUTPATIENT
Start: 2021-12-17 | End: 2021-12-18 | Stop reason: HOSPADM

## 2021-12-17 RX ORDER — ACETAMINOPHEN 325 MG/1
650 TABLET ORAL EVERY 6 HOURS PRN
Status: DISCONTINUED | OUTPATIENT
Start: 2021-12-17 | End: 2021-12-18 | Stop reason: HOSPADM

## 2021-12-17 RX ADMIN — HYDROCODONE BITARTRATE AND ACETAMINOPHEN 1 TABLET: 5; 325 TABLET ORAL at 06:23

## 2021-12-17 RX ADMIN — METOPROLOL TARTRATE 50 MG: 50 TABLET, FILM COATED ORAL at 11:02

## 2021-12-17 RX ADMIN — ASPIRIN 81 MG: 81 TABLET, COATED ORAL at 11:02

## 2021-12-17 RX ADMIN — MAGNESIUM OXIDE 400 MG: 400 TABLET ORAL at 20:59

## 2021-12-17 RX ADMIN — METOPROLOL TARTRATE 50 MG: 50 TABLET, FILM COATED ORAL at 20:59

## 2021-12-17 RX ADMIN — HYDROCODONE BITARTRATE AND ACETAMINOPHEN 1 TABLET: 5; 325 TABLET ORAL at 11:02

## 2021-12-17 RX ADMIN — ENOXAPARIN SODIUM 80 MG: 100 INJECTION SUBCUTANEOUS at 05:32

## 2021-12-17 RX ADMIN — IOPAMIDOL 88 ML: 755 INJECTION, SOLUTION INTRAVENOUS at 11:14

## 2021-12-17 RX ADMIN — SODIUM CHLORIDE, PRESERVATIVE FREE 10 ML: 5 INJECTION INTRAVENOUS at 11:03

## 2021-12-17 RX ADMIN — ENOXAPARIN SODIUM 80 MG: 100 INJECTION SUBCUTANEOUS at 20:59

## 2021-12-17 RX ADMIN — POTASSIUM CHLORIDE 40 MEQ: 1500 TABLET, EXTENDED RELEASE ORAL at 17:32

## 2021-12-17 RX ADMIN — HYDROCODONE BITARTRATE AND ACETAMINOPHEN 1 TABLET: 5; 325 TABLET ORAL at 20:59

## 2021-12-17 ASSESSMENT — PAIN DESCRIPTION - ONSET: ONSET: ON-GOING

## 2021-12-17 ASSESSMENT — PAIN DESCRIPTION - LOCATION
LOCATION: LEG
LOCATION: LEG

## 2021-12-17 ASSESSMENT — PAIN DESCRIPTION - PAIN TYPE
TYPE: ACUTE PAIN
TYPE: ACUTE PAIN

## 2021-12-17 ASSESSMENT — PAIN SCALES - GENERAL
PAINLEVEL_OUTOF10: 9
PAINLEVEL_OUTOF10: 0
PAINLEVEL_OUTOF10: 8

## 2021-12-17 ASSESSMENT — PAIN DESCRIPTION - FREQUENCY: FREQUENCY: CONTINUOUS

## 2021-12-17 ASSESSMENT — PAIN DESCRIPTION - ORIENTATION
ORIENTATION: LEFT;LOWER
ORIENTATION: LEFT;LOWER

## 2021-12-17 ASSESSMENT — PAIN DESCRIPTION - PROGRESSION: CLINICAL_PROGRESSION: GRADUALLY WORSENING

## 2021-12-17 NOTE — ED NOTES
Patient to IR for Veinogram at this time via [address] [place] [address] Avenue, RN  12/17/21 2747 Quality 431: Preventive Care And Screening: Unhealthy Alcohol Use - Screening: Patient screened for unhealthy alcohol use using a single question and scores less than 2 times per year Quality 110: Preventive Care And Screening: Influenza Immunization: Influenza immunization was not ordered or administered, reason not given Quality 130: Documentation Of Current Medications In The Medical Record: Current Medications Documented Quality 226: Preventive Care And Screening: Tobacco Use: Screening And Cessation Intervention: Tobacco Screening not Performed for Medical Reasons Detail Level: Detailed

## 2021-12-17 NOTE — PROGRESS NOTES
PROCEDURE PERFORMED: LLE Venogram by     PRIMARY INDICATION FOR PROCEDURE: pain and swelling    INFORMED CONSENT:  Obtained prior to procedure Dr. Mari Nieto. Consent placed in chart. MARITZA SCORE PRE PROCEDURE:    10    PT TRANSPORTED FROM:          ED                          TO THE IR ROOM:      Large Room    PT IN THE ROOM AT WHAT TIME:    5567    ASSESSMENT: Patient alert and oriented. No distress noted. Procedure explained to patient. TIME OUT COMPLETE: 1412    BARRIER PRECAUTIONS & STERILE TECHNIQUE:               Pt transferred to the table and positioned for comfort. Warm blankets given. Pt placed on Cardiac Monitor. Pt prepped and draped in a sterile fashion with chlorhexadine. PAIN/LOCAL ANESTHESIA/SEDATION MANAGEMENT:           Local: Lidocaine given by Dr Mari Nieto          Sedation:              Fentanyl:             Versed:     INTRAOPERATIVE:           ACCESS TIME: 1415          US/FLUORO: US guided with  4  Images.   Fluoro used          WIRE USED: Glidewire 80cm          SHEATH USED:           CATHETER USED:           FINAL IMAGE TAKEN TO CONFIRM PLACEMENT OF:           CONTRAST/CC: Isovue 370---30cc used  STERILE DRESSINGS: Band Aid    SPECIMENS: None    EBL: Less than 1cc    FOLLOW- UP X-RAY: None    COMPLICATIONS/ OUTCOME: None    STAFF PRESENT DURING PROCEDURE: Dr. Gigi Thompson RN, Jessica RN    MARITZA SCORE POST PROCEDURE:      10    REPORT CALLED TO: Abdulkadir Iraheta RN OBS unit    PT LEFT ROOM AT WHAT TIME:       2337
no

## 2021-12-17 NOTE — ED PROVIDER NOTES
Triage Chief Complaint:   Leg Swelling (worse in L) and Leg Pain    [place]:  Tracy Gray is a [de-identified] y.o. female that presents with left leg pain and swelling. She states that over the past days she has had some increasing swelling and pain to her left leg. Pains are sharp in nature and located mostly to the calf. States that today she has also noticed some swelling to her right calf. The patient has a history of DVTs in her lower extremities and is on Xarelto. States that she feels like she may have been a little more short of breath over the last day as well. Denies any chest pain, cough, fever, abdominal pain, nausea or vomiting. Denies any recent injury. She has not had any fevers. ROS:  At least 10 systems reviewed and otherwise acutely negative except as in the [address] Past Medical History:   Diagnosis Date    Arthritis     DVT of deep femoral vein ([facility] [address] 2016    H/O echocardiogram Limited 2019    MOD AS, JOLENE 1.3 sq    History of echocardiogram 2019    EF >60% mild to moderate TR , MOD AS JOLENE 1.17 sq, Mod PHTN, Grade II DD.  Hypertension     PAD (peripheral artery disease) (HCC)     Pneumonia     Seizures (HCC)     Vertigo      Past Surgical History:   Procedure Laterality Date     SECTION      COLONOSCOPY      ENDOSCOPY, COLON, DIAGNOSTIC       Family History   Problem Relation Age of Onset    Cancer Mother         BREAST BILAT     Social History     Socioeconomic History    Marital status:       Spouse name: Not on file    Number of children: Not on file    Years of education: Not on file    Highest education level: Not on file   Occupational History    Not on file   Tobacco Use    Smoking status: Former Smoker     Packs/day: 0.25     Years: 35.00     Pack years: 8.75    Smokeless tobacco: Never Used   Vaping Use    Vaping Use: Never used   Substance and Sexual Activity    Alcohol use: No     Comment: social    Drug use: No    Sexual activity: Not Currently   Other Topics Concern    Not on file   Social History Narrative    Not on file     Social Determinants of Health     Financial Resource Strain:     Difficulty of Paying Living Expenses: Not on file   Food Insecurity:     Worried About Running Out of Food in the Last Year: Not on file    Ivan of Food in the Last Year: Not on file   Transportation Needs:     Lack of Transportation (Medical): Not on file    Lack of Transportation (Non-Medical):  Not on file   Physical Activity:     Days of Exercise per Week: Not on file    Minutes of Exercise per Session: Not on file   Stress:     Feeling of Stress : Not on file   Social Connections:     Frequency of Communication with Friends and Family: Not on file    Frequency of Social Gatherings with Friends and Family: Not on file    Attends Voodoo Services: Not on file    Active Member of [address] [place] [address] [employer] or Organizations: Not on file    Attends Club or Organization Meetings: Not on file    Marital Status: Not on file   Intimate Partner Violence:     Fear of Current or Ex-Partner: Not on file    Emotionally Abused: Not on file    Physically Abused: Not on file    Sexually Abused: Not on file   Housing Stability:     Unable to Pay for Housing in the Last Year: Not on file    Number of [place] in the Last Year: Not on file    Unstable Housing in the Last Year: Not on file     Current Facility-Administered Medications   Medication Dose Route Frequency Provider Last Rate Last Admin    enoxaparin (LOVENOX) injection 80 mg  1 mg/kg SubCUTAneous Once Jarad Gonzales MD         Current Outpatient Medications   Medication Sig Dispense Refill    rivaroxaban (XARELTO) 20 MG TABS tablet Take 20 mg by mouth 2 times daily      melatonin 3 MG TABS tablet Take 3 mg by mouth daily      enoxaparin (LOVENOX) 80 MG/0.8ML injection Inject 0.8 mLs into the skin 2 times daily 6 Syringe 3    warfarin (COUMADIN) 2.5 MG tablet Take 2 tablets daily at 5 PM and follow up with your PCP for INR on Friday 7/16/2021 4 tablet 3    traZODone (DESYREL) 50 MG tablet Take 1 tablet by mouth nightly 30 tablet 0    ondansetron (ZOFRAN-ODT) 4 MG disintegrating tablet Take 1 tablet by mouth every 8 hours as needed for Nausea or Vomiting 10 tablet 0    metoprolol tartrate (LOPRESSOR) 25 MG tablet Take 1 tablet by mouth 2 times daily (Patient taking differently: Take 50 mg by mouth 2 times daily ) 60 tablet 3    HYDROcodone-acetaminophen (NORCO) 5-325 MG per tablet Take 1 tablet by mouth every 6 hours as needed for Pain.  albuterol sulfate  (90 Base) MCG/ACT inhaler Inhale 2 puffs into the lungs every 6 hours as needed for Wheezing      potassium chloride (KLOR-CON) 20 MEQ packet Take 10 mEq by mouth daily       magnesium 30 MG tablet Take 200 mg by mouth nightly       furosemide (LASIX) 20 MG tablet Take 20 mg by mouth 2 times daily.  aspirin 81 MG tablet Take 81 mg by mouth daily. Allergies   Allergen Reactions    Coumadin [Warfarin Sodium] Hives     hives    Ambien [Zolpidem Tartrate] Other (See Comments)     hallucinations    Morphine Sulfate Other (See Comments)     agitation       Nursing Notes Reviewed    Physical Exam:  ED Triage Vitals [12/16/21 2214]   Enc Vitals Group      BP (!) 115/90      Pulse 80      Resp 18      Temp 98.1 °F (36.7 °C)      Temp Source Oral      SpO2 98 %      Weight 185 lb (83.9 kg)      Height 5' 11\" (1.803 m)      Head Circumference       Peak Flow       Pain Score       Pain Loc       Pain Edu? Excl. in [address]? GENERAL APPEARANCE: Awake and alert. Cooperative. No acute distress. HEAD: Normocephalic. Atraumatic. EYES: EOM's grossly intact. Sclera anicteric. ENT: Mucous membranes are moist. Tolerates saliva. No trismus. NECK: Supple. Trachea midline. HEART: RRR. Radial pulses 2+. LUNGS: Respirations unlabored. CTAB  ABDOMEN: Soft. Non-tender. No guarding or rebound.   EXTREMITIES: LLE: Tenderness to palpation over the calf with swelling and firmness. No erythema. Mild warmth. Palpable DP pulse. Sensory distribution of sural/saphenous/tibial/peroneal nerves intact  RLE: Mild swelling to the calf with tenderness to palpation. Compartments are soft. No overlying erythema or warmth. 2+ DP pulse. Sensory distribution of sural/saphenous/tibial/peroneal nerves intact  SKIN: Warm and dry. NEUROLOGICAL: No gross facial drooping. Moves all 4 extremities spontaneously. PSYCHIATRIC: Normal mood.     I have reviewed and interpreted all of the currently available lab results from this visit (if applicable):  Results for orders placed or performed during the hospital encounter of 12/17/21   CBC Auto Differential   Result Value Ref Range    WBC 5.2 4.0 - 10.5 K/CU MM    RBC 3.74 (L) 4.2 - 5.4 M/CU MM    Hemoglobin 11.5 (L) 12.5 - 16.0 GM/DL    Hematocrit 37.8 37 - 47 %    .1 (H) 78 - 100 FL    MCH 30.7 27 - 31 PG    MCHC 30.4 (L) 32.0 - 36.0 %    RDW 15.3 (H) 11.7 - 14.9 %    Platelets 950 514 - 280 K/CU MM    MPV 10.3 7.5 - 11.1 FL    Differential Type AUTOMATED DIFFERENTIAL     Segs Relative 53.1 36 - 66 %    Lymphocytes % 26.2 24 - 44 %    Monocytes % 12.1 (H) 0 - 4 %    Eosinophils % 7.1 (H) 0 - 3 %    Basophils % 1.1 (H) 0 - 1 %    Segs Absolute 2.8 K/CU MM    Lymphocytes Absolute 1.4 K/CU MM    Monocytes Absolute 0.6 K/CU MM    Eosinophils Absolute 0.4 K/CU MM    Basophils Absolute 0.1 K/CU MM    Nucleated RBC % 0.0 %    Total Nucleated RBC 0.0 K/CU MM    Total Immature Neutrophil 0.02 K/CU MM    Immature Neutrophil % 0.4 0 - 0.43 %   Comprehensive Metabolic Panel w/ Reflex to MG   Result Value Ref Range    Sodium 139 135 - 145 MMOL/L    Potassium 3.2 (L) 3.5 - 5.1 MMOL/L    Chloride 100 99 - 110 mMol/L    CO2 29 21 - 32 MMOL/L    BUN 12 6 - 23 MG/DL    CREATININE 0.6 0.6 - 1.1 MG/DL    Glucose 86 70 - 99 MG/DL    Calcium 9.3 8.3 - 10.6 MG/DL    Albumin 3.9 3.4 - 5.0 GM/DL    Total Protein 7.1 6.4 - 8.2 GM/DL    Total Bilirubin 0.8 0.0 - 1.0 MG/DL    ALT <5 (L) 10 - 40 U/L    AST 14 (L) 15 - 37 IU/L    Alkaline Phosphatase 65 40 - 128 IU/L    GFR Non-African American >60 >60 mL/min/1.73m2    GFR African American >60 >60 mL/min/1.73m2    Anion Gap 10 4 - 16   Brain Natriuretic Peptide   Result Value Ref Range    Pro-BNP 2,251 (H) <300 PG/ML      Radiographs (if obtained):  [] The following radiograph was interpreted by myself in the absence of a radiologist:  [x] Radiologist's Report Reviewed:    EKG (if obtained): (All EKG's are interpreted by myself in the absence of a cardiologist)  12 lead EKG as interpreted by me reveals normal sinus rhythm. Axis is normal. LVH. There are no ischemic ST elevations or other suspicious ST changes;  QRS interval is narrow, QT interval is not prolonged. Final interpretation: Normal sinus rhythm. LVH      MDM:  Plan of care is discussed thoroughly with the patient and family if present. If performed, all imaging and lab work also discussed with patient. All relevant prior results and chart reviewed if available. Patient presents as above. She is in no acute distress. Vital signs are normal.  Presents with left greater than right lower extremity swelling with associated pain and tenderness. She has a history of DVT and has previously failed Eliquis. She is currently on Xarelto and has been compliant. Low suspicion for pulmonary embolism given normal vital signs and lack of any chest pain. Ultrasound ordered for further evaluation. Ultrasound is showing occlusive right popliteal DVT. Remainder of patient's work-up is largely unremarkable. She has some mild pulmonary vascular congestion but again is not in respiratory distress or hypoxic. Some of the swelling in her lower extremities may be secondary to hypervolemia which can be addressed as an inpatient.  I spoke with Dr. Ava Borjas who at this time states that the patient needs to be started on Lovenox and admitted to the hospital. He will consider bridging to Pradaxa but previously the patient's insurance has not covered this. Patient continues to have left leg pain. On reevaluation, the patient's compartments seem much softer than they were initially. There is no crepitus, fluctuance or induration. No erythema overlying the patient's leg. Do not suspect underlying infectious etiology at this time. Additionally, no evidence of cellulitis seen on ultrasound. We will continue to monitor as an inpatient. Clinical Impression:  1.  Acute deep vein thrombosis (DVT) of popliteal vein of right lower extremity (HCC)      (Please note that portions of this note may have been completed with a voice recognition program. Efforts were made to edit the dictations but occasionally words are mis-transcribed.)    MD Isidoro Hernandez MD  12/17/21 5183       Giuliana Rock MD  12/17/21 4007

## 2021-12-17 NOTE — ED TRIAGE NOTES
Patient presents to the ER with c/o of leg swelling and leg pain x a few days. Patient states it is worse in her left leg. Patient take lasix at home.

## 2021-12-17 NOTE — H&P
[facility] History and Physical      CHIEF COMPLAINT:  Leg pain    History of Present Illness: 27-year-old female with history of recurrent DVTs. Recently had a DVT a few months ago while she is on Eliquis therefore switched to Coumadin. Reportedly she was intolerant to this therefore switched to Xarelto. She is taking it regularly. She actually came in for left-sided leg pain that occurred after a trauma. Found to have her right leg more swollen compared to the left. New DVT noted on ultrasound. Hematology consulted. Recommended Lovenox and admission. No recent travel. REVIEW OF SYSTEMS:  A comprehensive 14 point review of systems was negative except for: what is in the HPI    PMH:  Past Medical History:   Diagnosis Date    Arthritis     DVT of deep femoral vein ([address] 2016    H/O echocardiogram Limited 2019    MOD AS, JOLENE 1.3 sq    History of echocardiogram 2019    EF >60% mild to moderate TR , MOD AS JOLENE 1.17 sq, Mod PHTN, Grade II DD.  Hypertension     PAD (peripheral artery disease) (HCC)     Pneumonia     Seizures (HCC)     Vertigo        Surgical History:  Past Surgical History:   Procedure Laterality Date     SECTION      COLONOSCOPY      ENDOSCOPY, COLON, DIAGNOSTIC         Medications Prior to Admission:    Prior to Admission medications    Medication Sig Start Date End Date Taking?  Authorizing Provider   rivaroxaban (XARELTO) 20 MG TABS tablet Take 20 mg by mouth 2 times daily   Yes Historical Provider, MD   melatonin 3 MG TABS tablet Take 3 mg by mouth daily   Yes Historical Provider, MD   enoxaparin (LOVENOX) 80 MG/0.8ML injection Inject 0.8 mLs into the skin 2 times daily 21   Iggy Chen MD   warfarin (COUMADIN) 2.5 MG tablet Take 2 tablets daily at 5 PM and follow up with your PCP for INR on 2021   Iggy Chen MD   traZODone (DESYREL) 50 MG tablet Take 1 tablet by mouth nightly 21 Robby Moralez MD   ondansetron (ZOFRAN-ODT) 4 MG disintegrating tablet Take 1 tablet by mouth every 8 hours as needed for Nausea or Vomiting 7/14/21   Robby Moralez MD   metoprolol tartrate (LOPRESSOR) 25 MG tablet Take 1 tablet by mouth 2 times daily  Patient taking differently: Take 50 mg by mouth 2 times daily  7/14/21   Robby Moralez MD   HYDROcodone-acetaminophen (NORCO) 5-325 MG per tablet Take 1 tablet by mouth every 6 hours as needed for Pain. Historical Provider, MD   albuterol sulfate  (90 Base) MCG/ACT inhaler Inhale 2 puffs into the lungs every 6 hours as needed for Wheezing    Historical Provider, MD   potassium chloride (KLOR-CON) 20 MEQ packet Take 10 mEq by mouth daily     Historical Provider, MD   magnesium 30 MG tablet Take 200 mg by mouth nightly     Historical Provider, MD   furosemide (LASIX) 20 MG tablet Take 20 mg by mouth 2 times daily. Historical Provider, MD   aspirin 81 MG tablet Take 81 mg by mouth daily. Historical Provider, MD       Allergies:    Coumadin [warfarin sodium], Ambien [zolpidem tartrate], and Morphine sulfate    Social History:    reports that she has quit smoking. She has a 8.75 pack-year smoking history. She has never used smokeless tobacco. She reports that she does not drink alcohol and does not use drugs. Family History:   family history includes Cancer in her mother.        PHYSICAL EXAM:  Vitals:  BP (!) 115/90   Pulse 80   Temp 98.1 °F (36.7 °C) (Oral)   Resp 18   Ht 5' 11\" (1.803 m)   Wt 185 lb (83.9 kg)   SpO2 98%   BMI 25.80 kg/m²   General Appearance: alert and oriented to person, place and time and in no acute distress  Skin: warm and dry, turgor not diminished  Head: normocephalic and atraumatic  Eyes: pupils equal, round, and reactive to light, extraocular eye movements intact, conjunctivae normal  Neck: neck supple and non tender without mass   Pulmonary/Chest: clear to auscultation bilaterally- no wheezes, rales or rhonchi, normal air movement, no respiratory distress  Cardiovascular: normal rate, normal S1 and S2 and no M/R/R  Abdomen: soft, non-tender, non-distended, normal bowel sounds, no masses or organomegaly  Extremities: no cyanosis, no clubbing and no edema. Right leg larger than the left  Neurologic: no cranial nerve deficit and speech normal        LABS:  Recent Labs     12/17/21  0143      K 3.2*      CO2 29   BUN 12   CREATININE 0.6   GLUCOSE 86   CALCIUM 9.3       Recent Labs     12/17/21  0143   WBC 5.2   RBC 3.74*   HGB 11.5*   HCT 37.8   .1*   MCH 30.7   MCHC 30.4*   RDW 15.3*      MPV 10.3       No results for input(s): POCGLU in the last 72 hours. Radiology:   VL DUP LOWER EXTREMITY VENOUS BILATERAL   Final Result   Focal occlusive deep venous thrombosis within the right popliteal vein. No evidence of deep venous thrombosis within the left lower extremity. Findings were discussed with Dawson Robledo at 2:00 a.m. on 12/17/2021. RECOMMENDATIONS:   Unavailable         XR CHEST PORTABLE   Final Result   Diffuse interstitial prominence. Findings could be related to pulmonary   vascular congestion or atypical pneumonia. EKG: No acute abnormality    ASSESSMENT/PLAN:  Recurrent DVT  Failed outpatient anticoagulation  -Placed on Lovenox by hematology. Await further recommendations  -Has had DVTs on both sides in the past.    Hypertension  -BP controlled. Continue Lasix and metoprolol.     COPD without exacerbation  -continue bronchodilator treatment     Moderate Aortic stenosis  -Continue Lasix      DVT Prophylaxis: lovenox    Full code    NOTE: This report was transcribed using voice recognition software. Every effort was made to ensure accuracy; however, inadvertent computerized transcription errors may be present.   Electronically signed by Kp Livingston MD on 12/17/2021 at 3:11 AM  '

## 2021-12-17 NOTE — CONSULTS
[facility] ([facility])  CONSULTATION REPORT    REASON FOR CONSULT  To evaluate the patient with right lower extremity DVT. CHIEF COMPLAINT    Chief Complaint   Patient presents with    Leg Swelling     worse in L    Leg Pain       HISTORY OF PRESENT ILLNESS   Juancarlos Leung is a [de-identified] y.o. female with medical history significant for HTN, PAD, seizure disorder, vertigo, history of DVT in 2016, was on eliquis until 2021, when she had recurrent DVT and we sent her home with coumadin. However, her coumadin was later switched to xarelto. On 21, she presented to ER with pain and swelling in left lower extremity. Repeat doppler showed she has right popliteal vein thrombosis. I was called to evaluate her.     (she was first diagnosed with left LE DVT ~  or . She has been on coumadin until 2015 when she presented with supra therapeutic INR. Coumadin was stooped since 8/15/2015. She then had recurrent left LE DVT on 2016 and she has been on eliquis since then. Factor V leiden and prothrombin gene mutation done on  were negative. Homocysteine was mildly elevated). PAST MEDICAL HISTORY    Past Medical History:   Diagnosis Date    Arthritis     DVT of deep femoral vein ([address] 2016    H/O echocardiogram Limited 2019    MOD AS, JOLENE 1.3 sq    History of echocardiogram 2019    EF >60% mild to moderate TR , MOD AS JOLENE 1.17 sq, Mod PHTN, Grade II DD.  Hypertension     PAD (peripheral artery disease) (HCC)     Pneumonia     Seizures (HCC)     Vertigo        SURGICAL HISTORY    Past Surgical History:   Procedure Laterality Date     SECTION      COLONOSCOPY      ENDOSCOPY, COLON, DIAGNOSTIC         FAMILY HISTORY    Family History   Problem Relation Age of Onset    Cancer Mother         BREAST BILAT       SOCIAL HISTORY    Social History     Socioeconomic History    Marital status:       Spouse name: Not on file    Number of children: Not on file    Years of education: Not on file    Highest education level: Not on file   Occupational History    Not on file   Tobacco Use    Smoking status: Former Smoker     Packs/day: 0.25     Years: 35.00     Pack years: 8.75    Smokeless tobacco: Never Used   Vaping Use    Vaping Use: Never used   Substance and Sexual Activity    Alcohol use: No     Comment: social    Drug use: No    Sexual activity: Not Currently   Other Topics Concern    Not on file   Social History Narrative    Not on file     Social Determinants of Health     Financial Resource Strain:     Difficulty of Paying Living Expenses: Not on file   Food Insecurity:     Worried About Running Out of Food in the Last Year: Not on file    Ivan of Food in the Last Year: Not on file   Transportation Needs:     Lack of Transportation (Medical): Not on file    Lack of Transportation (Non-Medical):  Not on file   Physical Activity:     Days of Exercise per Week: Not on file    Minutes of Exercise per Session: Not on file   Stress:     Feeling of Stress : Not on file   Social Connections:     Frequency of Communication with Friends and Family: Not on file    Frequency of Social Gatherings with Friends and Family: Not on file    Attends [employer] Services: Not on file    Active Member of [address] [place] [address] [employer] or Organizations: Not on file    Attends Club or Organization Meetings: Not on file    Marital Status: Not on file   Intimate Partner Violence:     Fear of Current or Ex-Partner: Not on file    Emotionally Abused: Not on file    Physically Abused: Not on file    Sexually Abused: Not on file   Housing Stability:     Unable to Pay for Housing in the Last Year: Not on file    Number of [place] in the Last Year: Not on file    Unstable Housing in the Last Year: Not on file     REVIEW OF SYSTEMS    Constitutional:  Denies fever, chills, loss of appetite, weight loss, tiredness, fatigue or weakness   HEENT:  Denies swelling of neck glands  Respiratory:  Denies cough, shortness of breath or hemoptysis  Cardiovascular:  Denies chest pain, palpitations or swelling   GI:  Denies abdominal pain, nausea, vomiting, constipation or diarrhea   Musculoskeletal:  Denies back pain. Left lower extremity pain and tenderness to touch +, no significant swelling in right leg  Skin:  Denies rash   Neurologic:  Denies headache, focal weakness or sensory changes   All systems negative except as marked. PHYSICAL EXAM    Vitals: BP (!) 115/90   Pulse 80   Temp 98.1 °F (36.7 °C) (Oral)   Resp 18   Ht 5' 11\" (1.803 m)   Wt 185 lb (83.9 kg)   SpO2 98%   BMI 25.80 kg/m²   CONSTITUTIONAL: awake, alert, cooperative, no apparent distress   EYES: pupils equal, round and reactive to light, sclera clear and conjunctiva normal  ENT: Normocephalic, without obvious abnormality, atraumatic  NECK: supple, symmetrical, no jugular venous distension and no carotid bruits   HEMATOLOGIC/LYMPHATIC: no cervical, supraclavicular or axillary lymphadenopathy   LUNGS: VBS, no wheezes, no crackles, no rhonchi, no increased work of breathing and clear to auscultation   CARDIOVASCULAR: regular rate and rhythm, normal S1 and S2, no murmur noted  ABDOMEN: normal bowel sounds x 4, soft, non-distended, non-tender, no masses palpated, no hepatosplenomgaly   MUSCULOSKELETAL: full range of motion noted, tone is normal  NEUROLOGIC: awake, alert, oriented to name, place and time. Motor skills grossly intact. SKIN: Normal skin color, texture, turgor and no jaundice.  Skin appears intact   EXTREMITIES: no cyanosis, Left leg swelling, redness and tenderness +, no clubbing    LABORATORY RESULTS  CBC:   Recent Labs     12/17/21 0143   WBC 5.2   HGB 11.5*        BMP:    Recent Labs     12/17/21 0143      K 3.2*      CO2 29   BUN 12   CREATININE 0.6   GLUCOSE 86     Hepatic:   Recent Labs     12/17/21 0143   AST 14*   ALT <5*   BILITOT 0.8   ALKPHOS 65     INR: No results for input(s): INR in the last 72 hours. RADIOLOGY REPORTS  Lower extremity doppler 12/16/21  Focal occlusive deep venous thrombosis within the right popliteal vein.       No evidence of deep venous thrombosis within the left lower extremity. Lower extremity doppler 7/5/21  DVT within the right superficial femoral vein and popliteal vein which may be   acute on chronic.       No evidence of DVT within the left lower extremity.       Nondiagnostic evaluation below the knees bilaterally due to edema     ASSESSMENT  Right lower extremity DVT    RECOMMENDATION  She was first diagnosed with left LE DVT ~ 2009 or 2010. She has been on coumadin until 8/17/2015 when she presented with supra therapeutic INR. Coumadin was stooped since 8/15/2015. She then had recurrent left LE DVT on 7/13/2016 and she has been on eliquis until 7/2021 when she had recurrent DVT. We tried to change her [facility] therapy to coumadin, but later she was placed on xarelto. She is taking xarelto regularly. I reviewed with radiologist regarding her lower extremity doppler. Her DVT in right lower extremity is getting better. No need to change [facility] therapy. For her left lower extremity pain and swelling, She started that she had fall and injury her left leg 3 weeks ago. I will request hospitalist to evaluate her left lower extremity pain. We will follow the patient. Thank you for allowing me to participate in the care of this very pleasant patient.

## 2021-12-18 VITALS
WEIGHT: 185 LBS | RESPIRATION RATE: 16 BRPM | SYSTOLIC BLOOD PRESSURE: 107 MMHG | HEIGHT: 71 IN | BODY MASS INDEX: 25.9 KG/M2 | HEART RATE: 81 BPM | DIASTOLIC BLOOD PRESSURE: 70 MMHG | OXYGEN SATURATION: 96 % | TEMPERATURE: 98.1 F

## 2021-12-18 LAB
ANION GAP SERPL CALCULATED.3IONS-SCNC: 11 MMOL/L (ref 4–16)
BUN BLDV-MCNC: 12 MG/DL (ref 6–23)
CALCIUM SERPL-MCNC: 8.9 MG/DL (ref 8.3–10.6)
CHLORIDE BLD-SCNC: 105 MMOL/L (ref 99–110)
CO2: 27 MMOL/L (ref 21–32)
CREAT SERPL-MCNC: 0.5 MG/DL (ref 0.6–1.1)
GFR AFRICAN AMERICAN: >60 ML/MIN/1.73M2
GFR NON-AFRICAN AMERICAN: >60 ML/MIN/1.73M2
GLUCOSE BLD-MCNC: 96 MG/DL (ref 70–99)
MAGNESIUM: 1.6 MG/DL (ref 1.8–2.4)
POTASSIUM SERPL-SCNC: 4.2 MMOL/L (ref 3.5–5.1)
SODIUM BLD-SCNC: 143 MMOL/L (ref 135–145)

## 2021-12-18 PROCEDURE — 94761 N-INVAS EAR/PLS OXIMETRY MLT: CPT

## 2021-12-18 PROCEDURE — 2580000003 HC RX 258: Performed by: INTERNAL MEDICINE

## 2021-12-18 PROCEDURE — 80048 BASIC METABOLIC PNL TOTAL CA: CPT

## 2021-12-18 PROCEDURE — 83735 ASSAY OF MAGNESIUM: CPT

## 2021-12-18 PROCEDURE — 6370000000 HC RX 637 (ALT 250 FOR IP): Performed by: NURSE PRACTITIONER

## 2021-12-18 PROCEDURE — 36415 COLL VENOUS BLD VENIPUNCTURE: CPT

## 2021-12-18 PROCEDURE — 96372 THER/PROPH/DIAG INJ SC/IM: CPT

## 2021-12-18 PROCEDURE — 6360000002 HC RX W HCPCS: Performed by: INTERNAL MEDICINE

## 2021-12-18 PROCEDURE — G0378 HOSPITAL OBSERVATION PER HR: HCPCS

## 2021-12-18 PROCEDURE — 6370000000 HC RX 637 (ALT 250 FOR IP): Performed by: INTERNAL MEDICINE

## 2021-12-18 RX ORDER — HYDROCODONE BITARTRATE AND ACETAMINOPHEN 5; 325 MG/1; MG/1
1 TABLET ORAL EVERY 4 HOURS PRN
Qty: 6 TABLET | Refills: 0 | Status: SHIPPED | OUTPATIENT
Start: 2021-12-18 | End: 2021-12-24

## 2021-12-18 RX ADMIN — SODIUM CHLORIDE, PRESERVATIVE FREE 10 ML: 5 INJECTION INTRAVENOUS at 00:15

## 2021-12-18 RX ADMIN — HYDROCODONE BITARTRATE AND ACETAMINOPHEN 1 TABLET: 5; 325 TABLET ORAL at 14:18

## 2021-12-18 RX ADMIN — METOPROLOL TARTRATE 50 MG: 50 TABLET, FILM COATED ORAL at 08:37

## 2021-12-18 RX ADMIN — SODIUM CHLORIDE, PRESERVATIVE FREE 10 ML: 5 INJECTION INTRAVENOUS at 08:38

## 2021-12-18 RX ADMIN — MAGNESIUM OXIDE 400 MG: 400 TABLET ORAL at 08:37

## 2021-12-18 RX ADMIN — MAGNESIUM OXIDE 400 MG: 400 TABLET ORAL at 14:18

## 2021-12-18 RX ADMIN — POTASSIUM CHLORIDE 40 MEQ: 1500 TABLET, EXTENDED RELEASE ORAL at 08:37

## 2021-12-18 RX ADMIN — ASPIRIN 81 MG: 81 TABLET, COATED ORAL at 08:37

## 2021-12-18 RX ADMIN — HYDROCODONE BITARTRATE AND ACETAMINOPHEN 1 TABLET: 5; 325 TABLET ORAL at 08:37

## 2021-12-18 RX ADMIN — ENOXAPARIN SODIUM 80 MG: 100 INJECTION SUBCUTANEOUS at 08:37

## 2021-12-18 ASSESSMENT — PAIN SCALES - GENERAL
PAINLEVEL_OUTOF10: 8
PAINLEVEL_OUTOF10: 10
PAINLEVEL_OUTOF10: 10

## 2021-12-18 ASSESSMENT — PAIN DESCRIPTION - LOCATION: LOCATION: GENERALIZED

## 2021-12-18 ASSESSMENT — PAIN DESCRIPTION - PAIN TYPE: TYPE: ACUTE PAIN

## 2021-12-18 NOTE — DISCHARGE SUMMARY
Physician Discharge Summary     Patient ID:  Trinh Nunez  [id number]  35 y.o.  1940    Admit date: 12/17/2021    Discharge date and time: 12/18/2021     Admitting Physician: Madalyn Ennis MD     Discharge Physician: Nany Casillas CNP    Admission Diagnoses:  DVT of axillary vein, acute left ([address] [M11. A12]  Acute deep vein thrombosis (DVT) of popliteal vein of right lower extremity ([address] [I82.431]    Discharge Diagnoses:   Subacute right leg DVT. Left leg venous insufficiency. Hypertension  COPD without acute exacerbation  Moderate aortic stenosis    Admission Condition: fair    Discharged Condition: good    Indication for Admission: Left leg pain    Hospital Course:   80-year-old female with history of recurrent DVTs. Recently had a right leg DVT a few months ago while she is on Eliquis therefore switched to Coumadin. Reportedly she was intolerant to this therefore switched to Xarelto. She is taking it regularly. She actually came in for left-sided leg pain that occurred after a trauma. Found to have her right leg more swollen compared to the left. DVT noted on ultrasound. Hematology consulted. Recommended Lovenox and admission. Hematology Dr. Abelardo Hernandez reviewed the ultrasound result with radiology, clearly it was subacute DVT which has been improving, this is not a new DVT. Ultrasound showed no DVT on the left side, however patient continued to complain left leg pain and swelling, she believed she is having left-sided DVT as well. Left leg CTA showed no bony fracture, no critical arterial stenosis/blockage. Venogram of left leg showed negative for DVT. Patient reassured that left leg swelling is due to venous valvular insufficiency likely secondary to prior left DVT. She was instructed to wear an elastic stocking. Patient will continue to take Xarelto for DVT. Patient is stable for discharge.     Consults: hematology/oncology    Significant Diagnostic Studies: CTA of left leg, ultrasound, venogram    Outstanding Order Results     No orders found for last 30 day(s).           Treatments: Medical management    Discharge Exam:  /68   Pulse 90   Temp 97.5 °F (36.4 °C) (Oral)   Resp 18   Ht 5' 11\" (1.803 m)   Wt 185 lb (83.9 kg)   SpO2 94%   BMI 25.80 kg/m²     General Appearance:    Alert, cooperative, no distress, appears stated age   Head:    Normocephalic, without obvious abnormality, atraumatic   Eyes:    PERRL, conjunctiva/corneas clear, EOM's intact, fundi     benign, both eyes   Ears:    Normal TM's and external ear canals, both ears   Nose:   Nares normal, septum midline, mucosa normal, no drainage    or sinus tenderness   Throat:   Lips, mucosa, and tongue normal; teeth and gums normal   Neck:   Supple, symmetrical, trachea midline, no adenopathy;     thyroid:  no enlargement/tenderness/nodules; no carotid    bruit or JVD   Back:     Symmetric, no curvature, ROM normal, no CVA tenderness   Lungs:     Clear to auscultation bilaterally, respirations unlabored   Chest Wall:    No tenderness or deformity    Heart:    Regular rate and rhythm, S1 and S2 normal, no murmur, rub   or gallop   Breast Exam:    No tenderness, masses, or nipple abnormality   Abdomen:     Soft, non-tender, bowel sounds active all four quadrants,     no masses, no organomegaly   Genitalia:    Normal female without lesion, discharge or tenderness   Rectal:    Normal tone ;guaiac negative stool   Extremities:   Extremities normal, atraumatic, no cyanosis or edema   Pulses:   2+ and symmetric all extremities   Skin:   Skin color, texture, turgor normal, no rashes or lesions   Lymph nodes:   Cervical, supraclavicular, and axillary nodes normal   Neurologic:   CNII-XII intact, normal strength, sensation and reflexes     throughout        Medication List      CHANGE how you take these medications    * HYDROcodone-acetaminophen 5-325 MG per tablet  Commonly known as: NORCO  Take 1 tablet by mouth every 4 hours as needed for Pain for up to 6 days. What changed: You were already taking a medication with the same name, and this prescription was added. Make sure you understand how and when to take each. * HYDROcodone-acetaminophen 5-325 MG per tablet  Commonly known as: Amparo Gordon  What changed: Another medication with the same name was added. Make sure you understand how and when to take each. metoprolol tartrate 25 MG tablet  Commonly known as: LOPRESSOR  Take 1 tablet by mouth 2 times daily  What changed: how much to take         * This list has 2 medication(s) that are the same as other medications prescribed for you. Read the directions carefully, and ask your doctor or other care provider to review them with you. CONTINUE taking these medications    albuterol sulfate  (90 Base) MCG/ACT inhaler     aspirin 81 MG tablet     furosemide 20 MG tablet  Commonly known as: LASIX     magnesium 30 MG tablet     melatonin 3 MG Tabs tablet     ondansetron 4 MG disintegrating tablet  Commonly known as: ZOFRAN-ODT  Take 1 tablet by mouth every 8 hours as needed for Nausea or Vomiting     potassium chloride 20 MEQ packet  Commonly known as: KLOR-CON     traZODone 50 MG tablet  Commonly known as: DESYREL  Take 1 tablet by mouth nightly     Xarelto 20 MG Tabs tablet  Generic drug: rivaroxaban        STOP taking these medications    enoxaparin 80 MG/0.8ML injection  Commonly known as: LOVENOX     warfarin 2.5 MG tablet  Commonly known as: COUMADIN           Where to Get Your Medications      You can get these medications from any pharmacy    Bring a paper prescription for each of these medications  · HYDROcodone-acetaminophen 5-325 MG per tablet       Disposition: home    Patient Instructions:   [unfilled]  Activity: activity as tolerated  Diet: cardiac diet  Wound Care: none needed    Follow-up with PCP in a month.      Signed:  ISHA Bergman CNP  12/18/2021  10:39 AM

## 2021-12-20 ENCOUNTER — CARE COORDINATION (OUTPATIENT)
Dept: CASE MANAGEMENT | Age: 81
End: 2021-12-20

## 2021-12-20 RX ADMIN — IOPAMIDOL 30 ML: 755 INJECTION, SOLUTION INTRAVENOUS at 07:35

## 2021-12-20 NOTE — CARE COORDINATION
Care Transitions Outreach Attempt    Call within 2 business days of discharge: Yes   Attempted to reach patient for transitions of care follow up. Unable to reach patient. Patient: Marily Live Patient : 1940 MRN: <[id number]>    Last Discharge [address] [address]       Complaint Diagnosis Description Type Department Provider    21 Leg Swelling; Leg Pain Acute deep vein thrombosis (DVT) of popliteal vein of right lower extremity ([address] . .. ED to Hosp-Admission (Discharged) (ADMITTED) [address] Adonis Stewart MD; ROBSON Echols. Was this an external facility discharge? No Discharge Facility: [facility] Foot up these medications from  [address] [place] [address], [id number] [place] [address] [phone]  - F [phone]  rivaroxaban   these medications from any  pharmacy with your printed  prescription  HYDROcodone-acetaminophen  Schedule an appointment with ISHA Plata CNP as soon as possible  for a visit in 1 week(s)      Noted following upcoming appointments from discharge chart review:   [facility] follow up appointment(s): No future appointments.   Non-[facility] follow up appointment(s):

## 2021-12-21 ENCOUNTER — CARE COORDINATION (OUTPATIENT)
Dept: CASE MANAGEMENT | Age: 81
End: 2021-12-21

## 2021-12-21 DIAGNOSIS — I82.A12 DVT OF AXILLARY VEIN, ACUTE LEFT (HCC): Primary | ICD-10-CM

## 2021-12-21 PROCEDURE — 1111F DSCHRG MED/CURRENT MED MERGE: CPT | Performed by: NURSE PRACTITIONER

## 2021-12-21 NOTE — CARE COORDINATION
[facility] Transitions Initial Follow Up Call    Call within 2 business days of discharge: Yes    Patient: Quoc Murray Patient : 1940   MRN: [id number]  Reason for Admission: Acute deep vein thrombosis (DVT) of popliteal vein of right lower extremity  Discharge Date: 21 RARS: Readmission Risk Score: 15      Last Discharge [facility]       Complaint Diagnosis Description Type Department Provider    21 Leg Swelling; Leg Pain Acute deep vein thrombosis (DVT) of popliteal vein of right lower extremity ([address] . .. ED to Hosp-Admission (Discharged) (ADMITTED) [address] Loretta Sultana MD; ROBSON Gould. Spoke with Negro, said she is doing ok, just really SOB from walking, has COPD. Said her left leg is still sore, it looks good in the morning, no swelling, not hard but will start to swell and gets hard to touch when she is up and around. She does elevate legs when sitting. She is taking Xarelto as directed. Reviewed other medications, no concerns. She is using her inhaler. Her SOB improved while talking with me. Her appetite and fluid intake is good. She doesn't have to cook right now, family bringing her food. She has an appt with PCP next week. No other questions or concerns at this time. Will continue to follow.     Non-face-to-face services provided:  Scheduled appointment with PCP-  Obtained and reviewed discharge summary and/or continuity of care documents    Care Transitions 24 Hour Call    Schedule Follow Up Appointment with PCP: Completed  Do you have any ongoing symptoms?: No  Do you have a copy of your discharge instructions?: Yes  Do you have all of your prescriptions and are they filled?: Yes  Have you been contacted by a [facility] Pharmacist?: No  Have you scheduled your follow up appointment?: Yes  How are you going to get to your appointment?: Public transportation (Comment: or family)  Were you discharged with any Home Care or Post Acute Services: No  Do you feel like you have everything you need to keep you well at home?: Yes  Care Transitions Interventions     Transitions of Care Initial Call    Challenges to be reviewed by the provider   Additional needs identified to be addressed with provider: No  none             Method of communication with provider : none      Advance Care Planning:   Does patient have an Advance Directive: not on file. Was this a readmission? No  Patient stated reason for admission: leg swelling  Patients top risk factors for readmission: lack of knowledge about disease  medical condition-DVT, COPD    Care Transition Nurse (CTN) contacted the patient by telephone to perform post hospital discharge assessment. Verified name and  with patient as identifiers. Provided introduction to self, and explanation of the CTN role. CTN reviewed discharge instructions, medical action plan and red flags with patient who verbalized understanding. Patient given an opportunity to ask questions and does not have any further questions or concerns at this time. Were discharge instructions available to patient? Yes. Reviewed appropriate site of care based on symptoms and resources available to patient including: PCP. The patient agrees to contact the PCP office for questions related to their healthcare. Medication reconciliation was performed with patient, who verbalizes understanding of administration of home medications. Advised obtaining a 90-day supply of all daily and as-needed medications. Covid Risk Education     Educated patient about risk for severe COVID-19 due to risk factors according to CDC guidelines. CTN reviewed discharge instructions, medical action plan and red flag symptoms with the patient who verbalized understanding. Discussed COVID vaccination status: Yes. Education provided on COVID-19 vaccination as appropriate. Discussed exposure protocols and quarantine with CDC Guidelines.  Patient was given an opportunity to verbalize any questions and concerns and agrees to contact CTN or health care provider for questions related to their healthcare. Reviewed and educated patient on any new and changed medications related to discharge diagnosis. Was patient discharged with a pulse oximeter? No     CTN provided contact information. Plan for follow-up call in 5-7 days based on severity of symptoms and risk factors. Plan for next call: symptom management-DVT      Follow Up  No future appointments.     Daniela Flores RN

## 2021-12-30 ENCOUNTER — CARE COORDINATION (OUTPATIENT)
Dept: CASE MANAGEMENT | Age: 81
End: 2021-12-30

## 2021-12-30 NOTE — CARE COORDINATION
[facility] Transitions Follow Up Call    2021    Patient: Khloe Islas  Patient : 1940   MRN: <[id number]>  Reason for Admission: DVT  Discharge Date: 21 RARS: Readmission Risk Score: 15    Attempted to contact patient for care transition follow up. Unable to reach patient. Home number listed is invalid. CTN attempted to call the other number listed. Male answered the phone. When introducing self, male said \"WHAT\". CTN attempted to introduce self again and reason for call. Male hung up. Will try again at a later time. Follow Up  No future appointments.     Ron Sheets, RN

## 2022-01-06 ENCOUNTER — CARE COORDINATION (OUTPATIENT)
Dept: CASE MANAGEMENT | Age: 82
End: 2022-01-06

## 2022-01-06 NOTE — CARE COORDINATION
Care Transitions Outreach Attempt    -    Unsuccessful    Attempted to reach patient for transitions of care follow up. Unable to reach patient. Message left on voicemail. Patient: Shira Barker Patient : 1940 MRN: [id number]    Last Discharge [facility]       Complaint Diagnosis Description Type Department Provider    21 Leg Swelling; Leg Pain Acute deep vein thrombosis (DVT) of popliteal vein of right lower extremity ([address] . .. ED to Hosp-Admission (Discharged) (ADMITTED) [address] Ronni Lr MD; ROBSON Dupree. Noted following upcoming appointments from discharge chart review:   [facility] follow up appointment(s): No future appointments.       Maty Murillo [phone]    RN Care Transitions Nurse

## 2022-03-25 ENCOUNTER — HOSPITAL ENCOUNTER (OUTPATIENT)
Dept: GENERAL RADIOLOGY | Age: 82
Discharge: HOME | End: 2022-03-25
Payer: COMMERCIAL

## 2022-03-25 ENCOUNTER — HOSPITAL ENCOUNTER (OUTPATIENT)
Age: 82
Discharge: HOME | End: 2022-03-25
Payer: COMMERCIAL

## 2022-03-25 DIAGNOSIS — M47.817 LUMBOSACRAL SPONDYLOSIS WITHOUT MYELOPATHY: ICD-10-CM

## 2022-03-25 DIAGNOSIS — M47.816 LUMBAR SPONDYLOSIS: ICD-10-CM

## 2022-03-25 PROCEDURE — 72100 X-RAY EXAM L-S SPINE 2/3 VWS: CPT

## 2022-06-17 ENCOUNTER — HOSPITAL ENCOUNTER (OUTPATIENT)
Age: 82
Discharge: HOME | End: 2022-06-17
Payer: COMMERCIAL

## 2022-06-17 ENCOUNTER — HOSPITAL ENCOUNTER (OUTPATIENT)
Dept: GENERAL RADIOLOGY | Age: 82
Discharge: HOME | End: 2022-06-17
Payer: COMMERCIAL

## 2022-06-17 DIAGNOSIS — M25.512 BILATERAL SHOULDER PAIN, UNSPECIFIED CHRONICITY: ICD-10-CM

## 2022-06-17 DIAGNOSIS — M25.511 BILATERAL SHOULDER PAIN, UNSPECIFIED CHRONICITY: ICD-10-CM

## 2022-06-17 PROCEDURE — 73030 X-RAY EXAM OF SHOULDER: CPT

## 2022-09-26 ENCOUNTER — OFFICE VISIT (OUTPATIENT)
Dept: CARDIOLOGY CLINIC | Age: 82
End: 2022-09-26
Payer: COMMERCIAL

## 2022-09-26 VITALS
SYSTOLIC BLOOD PRESSURE: 118 MMHG | HEART RATE: 102 BPM | WEIGHT: 195 LBS | BODY MASS INDEX: 27.3 KG/M2 | DIASTOLIC BLOOD PRESSURE: 80 MMHG | HEIGHT: 71 IN

## 2022-09-26 DIAGNOSIS — G44.019 EPISODIC CLUSTER HEADACHE, NOT INTRACTABLE: ICD-10-CM

## 2022-09-26 DIAGNOSIS — R09.89 CAROTID BRUIT, UNSPECIFIED LATERALITY: ICD-10-CM

## 2022-09-26 DIAGNOSIS — R07.2 PRECORDIAL PAIN: Primary | ICD-10-CM

## 2022-09-26 DIAGNOSIS — R06.02 SOB (SHORTNESS OF BREATH): ICD-10-CM

## 2022-09-26 PROCEDURE — 99214 OFFICE O/P EST MOD 30 MIN: CPT | Performed by: INTERNAL MEDICINE

## 2022-09-26 PROCEDURE — 1124F ACP DISCUSS-NO DSCNMKR DOCD: CPT | Performed by: INTERNAL MEDICINE

## 2022-09-26 PROCEDURE — 1090F PRES/ABSN URINE INCON ASSESS: CPT | Performed by: INTERNAL MEDICINE

## 2022-09-26 PROCEDURE — 1036F TOBACCO NON-USER: CPT | Performed by: INTERNAL MEDICINE

## 2022-09-26 PROCEDURE — G8427 DOCREV CUR MEDS BY ELIG CLIN: HCPCS | Performed by: INTERNAL MEDICINE

## 2022-09-26 PROCEDURE — G8417 CALC BMI ABV UP PARAM F/U: HCPCS | Performed by: INTERNAL MEDICINE

## 2022-09-26 PROCEDURE — G8400 PT W/DXA NO RESULTS DOC: HCPCS | Performed by: INTERNAL MEDICINE

## 2022-09-26 PROCEDURE — 93000 ELECTROCARDIOGRAM COMPLETE: CPT | Performed by: INTERNAL MEDICINE

## 2022-09-26 NOTE — PROGRESS NOTES
Carl Pearson MD        OFFICE  FOLLOWUP NOTE    Chief complaints: patient is here for management of Chest pain, moderate AS, PE, DVT    Subjective: +chest pain, + shortness of breath, no dizziness, no palpitations, + headache    HPI Negro is a 81 y. o.year old who  has a past medical history of Arthritis, DVT of deep femoral vein ([facility] H/O echocardiogram Limited, History of echocardiogram, Hypertension, PAD (peripheral artery disease) ([facility] Pneumonia, Seizures ([address] and Vertigo. and presents for management of Chest pain, moderate AS, PE, DVT which are well controlled      Current Outpatient Medications   Medication Sig Dispense Refill    rivaroxaban (XARELTO) 20 MG TABS tablet Take 1 tablet by mouth Daily with supper 30 tablet 0    melatonin 3 MG TABS tablet Take 3 mg by mouth daily      traZODone (DESYREL) 50 MG tablet Take 1 tablet by mouth nightly 30 tablet 0    ondansetron (ZOFRAN-ODT) 4 MG disintegrating tablet Take 1 tablet by mouth every 8 hours as needed for Nausea or Vomiting 10 tablet 0    metoprolol tartrate (LOPRESSOR) 25 MG tablet Take 1 tablet by mouth 2 times daily (Patient taking differently: Take 50 mg by mouth 2 times daily) 60 tablet 3    albuterol sulfate  (90 Base) MCG/ACT inhaler Inhale 2 puffs into the lungs every 6 hours as needed for Wheezing      potassium chloride (KLOR-CON) 20 MEQ packet Take 10 mEq by mouth daily       magnesium 30 MG tablet Take 200 mg by mouth nightly       furosemide (LASIX) 20 MG tablet Take 20 mg by mouth 2 times daily. aspirin 81 MG tablet Take 81 mg by mouth daily. No current facility-administered medications for this visit.      Allergies: Coumadin [warfarin sodium], Ambien [zolpidem tartrate], and Morphine sulfate  Past Medical History:   Diagnosis Date    Arthritis     DVT of deep femoral vein ([facility] 07/13/2016    H/O echocardiogram Limited 11/19/2019    MOD AS, JOLENE 1.3 sq    History of echocardiogram 06/18/2019    EF >60% venous distention noted. + carotid bruits, no apical -carotid delay  Respiratory:  Normal breath sounds, No respiratory distress, No wheezing, No chest tenderness. ,no use of accessory muscles, diaphragm movement is normal  Cardiovascular: (PMI) apex non displaced,no lifts no thrills, no s3,no s4, Normal heart rate, Normal rhythm, No murmurs, No rubs, No gallops. Carotid arteries pulse and amplitude are normal no bruit, no abdominal bruit noted ( normal abdominal aorta ausculation),   Extremities - No cyanosis, clubbing, + edema, no varicose veins    Abdomen - No masses, tenderness, or organomegaly, no hepato-splenomegally, no bruits  Musculoskeletal - No significant edema, no kyphosis or scoliosis, no deformity in any extremity noted, muscle strength and tone are normal  Skin: no ulcer,no scar,no stasis dermatitis   Neurologic - alert oriented times 3,Cranial nerves II through XII are grossly intact. There were no gross focal neurologic abnormalities. Psychiatric: normal mood and affect    Lab Results   Component Value Date/Time    CKTOTAL 58 01/19/2014 05:23 AM    CKMB 2.1 01/19/2014 12:24 AM    TROPONINI 0.007 01/19/2014 05:22 AM    TROPONINI <0.006 04/03/2012 03:01 PM     BNP:    Lab Results   Component Value Date/Time    BNP 45 01/18/2014 06:14 PM     PT/INR:  No results found for: PTINR  Lab Results   Component Value Date    LABA1C 5.8 03/09/2012     Lab Results   Component Value Date    CHOL 231 (H) 03/09/2012    TRIG 96 03/09/2012    HDL 67 03/09/2012    LDLDIRECT 166 (H) 03/09/2012     Lab Results   Component Value Date    ALT <5 (L) 12/17/2021    AST 14 (L) 12/17/2021     TSH:  No results found for: TSH    Impression:  Negro is a 81 y. o.year old who  has a past medical history of Arthritis, DVT of deep femoral vein ([facility] H/O echocardiogram Limited, History of echocardiogram, Hypertension, PAD (peripheral artery disease) ([facility] [address] [facility] Pneumonia, Seizures ([facility] [address] and Vertigo.  and presents with     Plan:  Chest pain and shortness of breath: echo and stress test ordered, she has progressive angina most likley from worsening aortic stenosis,Will arrange LHC/RHC, RECOMMED NOT TO EXERT.GARCIA LASIX  H/o PE: patient has left lung PE , and rt leg DVT, echo showed moderate AS, continue eliquis\  Recurrent thrombosis; she had left leg DVT in past and now rt leg with PE, will continue anticoagulation life long, RECHECK B/L LEG VENOUS DOPPLER  H/o subdural hematoma and headaches now: CT HEAD ordered, she is on xarelto  Rt leg DVT and pain: recheck doppler and will decide for venoplasty  Moderate AORTIC STENOSIs, echo repeated, JOLENE is 1.3 discussed with her about TAVR AND SAVR  HTN: stable, CONTINUE LOPRESSOR  H/o seizures  Deafness: left heat has tympanic perforation  All labs, medications and tests reviewed, continue all other medications of all above medical condition listed as is.

## 2022-09-26 NOTE — PATIENT INSTRUCTIONS
Please hold on to these instructions the  will call you within 1-9 business days when we receive authorization from your insurance. Nuclear Stress Test    WHAT TO EXPECT:   You will need to confirm the test or it could be cancelled. This test will take approximately 2 hours: 1 hour in the AM &    1 hour in the PM. You will be given a time by the Technologist after the first part is completed to come back. You will be given a medication, through an IV in the hand, this will safely simulate exercise. This IV is also needed to inject the radioactive isotope unless you are able toe walk the treadmill. You will receive an injection in the AM & PM before the pictures. Using a special camera, you will have one set of pictures of your heart taken in the AM and a set of pictures in the PM.     PREPARATION FOR TEST:  Eat a light breakfast such as water or juice and toast.  If you are DIABETIC: Eat a normal breakfast with NO CAFFEINE and take your insulin as normal.   AVOID ALL FOODS & DRINKS containing CAFFEINE 12 HOURS PRIOR TO THE TEST: Including coffee, Tea, Jay and other soft drinks even those labeled  caffeine free or decaffeinated. HOLD THESE MEDICATIONS Persantine & Theophylline (Theodur)  24 hours prior & bring your inhaler with you.    The physician will specify if the following Beta blockers need to be held for 24 hours prior to test: Lopressor (metoprolol)

## 2022-09-30 ENCOUNTER — PROCEDURE VISIT (OUTPATIENT)
Dept: CARDIOLOGY CLINIC | Age: 82
End: 2022-09-30
Payer: COMMERCIAL

## 2022-09-30 DIAGNOSIS — R07.2 PRECORDIAL PAIN: ICD-10-CM

## 2022-09-30 DIAGNOSIS — R06.02 SOB (SHORTNESS OF BREATH): ICD-10-CM

## 2022-09-30 LAB
LV EF: 58 %
LVEF MODALITY: NORMAL

## 2022-09-30 PROCEDURE — 93306 TTE W/DOPPLER COMPLETE: CPT | Performed by: INTERNAL MEDICINE

## 2022-10-04 ENCOUNTER — TELEPHONE (OUTPATIENT)
Dept: CARDIOLOGY CLINIC | Age: 82
End: 2022-10-04

## 2022-10-04 NOTE — TELEPHONE ENCOUNTER
Summary   Left ventricular function and size is normal, EF is estimated at 55-60%. Severe left ventricular hypertrophy. Normal diastolic filling pattern for age. No regional wall motion abnormalities were detected. Mildly dilated left atrium. Heavily calcified aortic valve with severe aortic stenosis; mean PmmHG. JOLENE: 0.63cm2. DVI: 0.2. Mitral annular calcification and the tips of the mitral valve leaflets are   calcified. Mild mitral , tricuspid and moderate aortic regurgitation is present. RVSP is 33 mmHg. No evidence of pericardial effusion. Spoke to patient regarding results of echo. Patient voiced understanding.

## 2022-10-07 ENCOUNTER — PROCEDURE VISIT (OUTPATIENT)
Dept: CARDIOLOGY CLINIC | Age: 82
End: 2022-10-07
Payer: COMMERCIAL

## 2022-10-07 ENCOUNTER — HOSPITAL ENCOUNTER (OUTPATIENT)
Dept: CT IMAGING | Age: 82
Discharge: HOME | End: 2022-10-07
Payer: COMMERCIAL

## 2022-10-07 DIAGNOSIS — R06.02 SOB (SHORTNESS OF BREATH): ICD-10-CM

## 2022-10-07 DIAGNOSIS — G44.019 EPISODIC CLUSTER HEADACHE, NOT INTRACTABLE: ICD-10-CM

## 2022-10-07 LAB
LV EF: 47 %
LVEF MODALITY: NORMAL

## 2022-10-07 PROCEDURE — A9500 TC99M SESTAMIBI: HCPCS | Performed by: INTERNAL MEDICINE

## 2022-10-07 PROCEDURE — 70450 CT HEAD/BRAIN W/O DYE: CPT

## 2022-10-07 PROCEDURE — 93015 CV STRESS TEST SUPVJ I&R: CPT | Performed by: INTERNAL MEDICINE

## 2022-10-07 PROCEDURE — 78452 HT MUSCLE IMAGE SPECT MULT: CPT | Performed by: INTERNAL MEDICINE

## 2022-10-11 ENCOUNTER — TELEPHONE (OUTPATIENT)
Dept: CARDIOLOGY CLINIC | Age: 82
End: 2022-10-11

## 2022-10-11 NOTE — TELEPHONE ENCOUNTER
Supervising physician Dr. Tita Couch .    abnormal stress test, mildy depressed LVEF, normal EDV, Myocardial perfusion    scan shows small size, moderate intensity, reversible perfusion defect in    lateral wall.         Recommendation    OFFICE visit to discuss results

## 2022-10-14 ENCOUNTER — PROCEDURE VISIT (OUTPATIENT)
Dept: CARDIOLOGY CLINIC | Age: 82
End: 2022-10-14
Payer: COMMERCIAL

## 2022-10-14 DIAGNOSIS — R07.2 PRECORDIAL PAIN: ICD-10-CM

## 2022-10-14 DIAGNOSIS — R09.89 CAROTID BRUIT, UNSPECIFIED LATERALITY: Primary | ICD-10-CM

## 2022-10-14 DIAGNOSIS — I82.431 DEEP VEIN THROMBOSIS (DVT) OF POPLITEAL VEIN OF RIGHT LOWER EXTREMITY, UNSPECIFIED CHRONICITY (HCC): Primary | ICD-10-CM

## 2022-10-14 DIAGNOSIS — R06.02 SOB (SHORTNESS OF BREATH): ICD-10-CM

## 2022-10-14 PROCEDURE — 93970 EXTREMITY STUDY: CPT | Performed by: INTERNAL MEDICINE

## 2022-10-14 PROCEDURE — 93880 EXTRACRANIAL BILAT STUDY: CPT | Performed by: INTERNAL MEDICINE

## 2022-10-18 ENCOUNTER — TELEPHONE (OUTPATIENT)
Dept: CARDIOLOGY CLINIC | Age: 82
End: 2022-10-18

## 2022-10-18 NOTE — TELEPHONE ENCOUNTER
Summary        Normal venous duplex study of the bilateral lower extremity. There is no    evidence of deep or superficial venous thrombosis within the visualized    portions of the bilateral lower extremity. Recommendations        optimize medications     Summary        Mild (0-49%) disease of the Bilateral proximal Internal carotid artery. Normal vertebral flow. Recommendations        optimize medications       Attempted to call patient with results of lower extremity doppler and carotid unable to leave a message.

## 2022-10-21 ENCOUNTER — TELEPHONE (OUTPATIENT)
Dept: CARDIOLOGY CLINIC | Age: 82
End: 2022-10-21

## 2022-10-31 ENCOUNTER — OFFICE VISIT (OUTPATIENT)
Dept: CARDIOLOGY CLINIC | Age: 82
End: 2022-10-31
Payer: COMMERCIAL

## 2022-10-31 VITALS
WEIGHT: 199.2 LBS | DIASTOLIC BLOOD PRESSURE: 70 MMHG | HEIGHT: 71 IN | SYSTOLIC BLOOD PRESSURE: 114 MMHG | BODY MASS INDEX: 27.89 KG/M2 | OXYGEN SATURATION: 98 % | HEART RATE: 116 BPM

## 2022-10-31 DIAGNOSIS — R06.02 SOB (SHORTNESS OF BREATH): Primary | ICD-10-CM

## 2022-10-31 PROCEDURE — 1124F ACP DISCUSS-NO DSCNMKR DOCD: CPT | Performed by: INTERNAL MEDICINE

## 2022-10-31 PROCEDURE — G8427 DOCREV CUR MEDS BY ELIG CLIN: HCPCS | Performed by: INTERNAL MEDICINE

## 2022-10-31 PROCEDURE — G8400 PT W/DXA NO RESULTS DOC: HCPCS | Performed by: INTERNAL MEDICINE

## 2022-10-31 PROCEDURE — 99214 OFFICE O/P EST MOD 30 MIN: CPT | Performed by: INTERNAL MEDICINE

## 2022-10-31 PROCEDURE — G8484 FLU IMMUNIZE NO ADMIN: HCPCS | Performed by: INTERNAL MEDICINE

## 2022-10-31 PROCEDURE — G8417 CALC BMI ABV UP PARAM F/U: HCPCS | Performed by: INTERNAL MEDICINE

## 2022-10-31 PROCEDURE — 1036F TOBACCO NON-USER: CPT | Performed by: INTERNAL MEDICINE

## 2022-10-31 PROCEDURE — 1090F PRES/ABSN URINE INCON ASSESS: CPT | Performed by: INTERNAL MEDICINE

## 2022-10-31 RX ORDER — HYDROCODONE BITARTRATE AND ACETAMINOPHEN 5; 325 MG/1; MG/1
TABLET ORAL
COMMUNITY
Start: 2022-10-10

## 2022-10-31 NOTE — PROGRESS NOTES
Dante Banda MD        OFFICE  FOLLOWUP NOTE    Chief complaints: patient is here for management of Chest pain, moderate AS, PE, DVT      Subjective: + chest pain,  leg pain and weakness with walking left leg swelling, + shortness of breath, no dizziness, no palpitations    SEA Tom is a 81 y. o.year old who  has a past medical history of Arthritis, DVT of deep femoral vein ([facility] H/O echocardiogram Limited, History of echocardiogram, Hx of Doppler echocardiogram, Hypertension, PAD (peripheral artery disease) ([facility] [address] [facility] Pneumonia, Seizures ([facility] [address] [facility] and Vertigo. and presents for management of Chest pain, moderate AS, PE, DVT which are well controlled      Current Outpatient Medications   Medication Sig Dispense Refill    HYDROcodone-acetaminophen (NORCO) 5-325 MG per tablet TAKE 1 TABLET EVERY 8 HOURS AS NEEDED FOR PAIN AVOID ALCOHOL/CAUSES DROWSINESS      rivaroxaban (XARELTO) 20 MG TABS tablet Take 1 tablet by mouth Daily with supper 30 tablet 0    melatonin 3 MG TABS tablet Take 3 mg by mouth daily      traZODone (DESYREL) 50 MG tablet Take 1 tablet by mouth nightly 30 tablet 0    ondansetron (ZOFRAN-ODT) 4 MG disintegrating tablet Take 1 tablet by mouth every 8 hours as needed for Nausea or Vomiting 10 tablet 0    metoprolol tartrate (LOPRESSOR) 25 MG tablet Take 1 tablet by mouth 2 times daily (Patient taking differently: Take 50 mg by mouth 2 times daily) 60 tablet 3    albuterol sulfate  (90 Base) MCG/ACT inhaler Inhale 2 puffs into the lungs every 6 hours as needed for Wheezing      potassium chloride (KLOR-CON) 20 MEQ packet Take 10 mEq by mouth daily       magnesium 30 MG tablet Take 200 mg by mouth nightly       furosemide (LASIX) 20 MG tablet Take 20 mg by mouth 2 times daily. aspirin 81 MG tablet Take 81 mg by mouth daily. No current facility-administered medications for this visit.      Allergies: Coumadin [warfarin sodium], Ambien [zolpidem tartrate], and Morphine sulfate  Past Medical History:   Diagnosis Date    Arthritis     DVT of deep femoral vein ([facility] [address] 2016    H/O echocardiogram Limited 2019    MOD AS, JOLENE 1.3 sq    History of echocardiogram 2019    EF >60% mild to moderate TR , MOD AS JOLENE 1.17 sq, Mod PHTN, Grade II DD. Hx of Doppler echocardiogram 2022    EF 55-60% Severe LV hypertrophy. Mildly dilated LA. Heavily calcified aortic valve with severe AS. Mitral annular calcification and the tips of the mitral valve leaflets are calcified. Mild MR, TR, and mod AR. Hypertension     PAD (peripheral artery disease) (HCC)     Pneumonia     Seizures (HCC)     Vertigo      Past Surgical History:   Procedure Laterality Date     SECTION      COLONOSCOPY      ENDOSCOPY, COLON, DIAGNOSTIC       Family History   Problem Relation Age of Onset    Cancer Mother         BREAST BILAT     Social History     Tobacco Use    Smoking status: Former     Packs/day: 0.25     Years: 50.00     Pack years: 12.50     Types: Cigarettes     Quit date: 2022     Years since quittin.2    Smokeless tobacco: Never   Substance Use Topics    Alcohol use: Not Currently     Comment: occ. caffeine: a cup coffee daily.       [unfilled]  Review of Systems:   Constitutional: No Fever or Weight Loss   Eyes: No Decreased Vision  ENT: No Headaches, Hearing Loss or Vertigo  Cardiovascular:+ chest pain, dyspnea on exertion, palpitations or loss of consciousness  Respiratory: No cough or wheezing    Gastrointestinal: No abdominal pain, appetite loss, blood in stools, constipation, diarrhea or heartburn  Genitourinary: No dysuria, trouble voiding, or hematuria  Musculoskeletal:  No gait disturbance, weakness or joint complaints  Integumentary: No rash or pruritis  Neurological: No TIA or stroke symptoms  Psychiatric: No anxiety or depression  Endocrine: No malaise, fatigue or temperature intolerance  Hematologic/Lymphatic: No bleeding problems, blood clots or swollen lymph nodes  Allergic/Immunologic: No nasal congestion or hives  All systems negative except as marked. Objective:  /70 (Site: Left Upper Arm, Position: Sitting, Cuff Size: Medium Adult)   Pulse (!) 116   Ht 5' 11\" (1.803 m)   Wt 199 lb 3.2 oz (90.4 kg)   SpO2 98%   BMI 27.78 kg/m²   Wt Readings from Last 3 Encounters:   10/31/22 199 lb 3.2 oz (90.4 kg)   09/26/22 195 lb (88.5 kg)   12/16/21 185 lb (83.9 kg)     Body mass index is 27.78 kg/m². GENERAL - Alert, oriented, pleasant, in no apparent distress,normal grooming  HEENT - pupils are intact, cornea intact, conjunctive normal color, ears are normal in exam,  Neck - Supple. No jugular venous distention noted. No carotid bruits, no apical -carotid delay  Respiratory:  Normal breath sounds, No respiratory distress, No wheezing, No chest tenderness. ,no use of accessory muscles, diaphragm movement is normal  Cardiovascular: (PMI) apex non displaced,no lifts no thrills, no s3,no s4, Normal heart rate, Normal rhythm, No murmurs, No rubs, No gallops. Carotid arteries pulse and amplitude are normal no bruit, no abdominal bruit noted ( normal abdominal aorta ausculation),   Extremities - No cyanosis, clubbing, or significant edema, no varicose veins    Abdomen - No masses, tenderness, or organomegaly, no hepato-splenomegally, no bruits  Musculoskeletal - No significant edema, no kyphosis or scoliosis, no deformity in any extremity noted, muscle strength and tone are normal  Skin: no ulcer,no scar,no stasis dermatitis   Neurologic - alert oriented times 3,Cranial nerves II through XII are grossly intact. There were no gross focal neurologic abnormalities.    Psychiatric: normal mood and affect    Lab Results   Component Value Date/Time    CKTOTAL 58 01/19/2014 05:23 AM    CKMB 2.1 01/19/2014 12:24 AM    TROPONINI 0.007 01/19/2014 05:22 AM    TROPONINI <0.006 04/03/2012 03:01 PM     BNP:    Lab Results   Component Value Date/Time    BNP 45 01/18/2014 06:14 PM PT/INR:  No results found for: PTINR  Lab Results   Component Value Date    LABA1C 5.8 03/09/2012     Lab Results   Component Value Date    CHOL 231 (H) 03/09/2012    TRIG 96 03/09/2012    HDL 67 03/09/2012    LDLDIRECT 166 (H) 03/09/2012     Lab Results   Component Value Date    ALT <5 (L) 12/17/2021    AST 14 (L) 12/17/2021     TSH:  No results found for: TSH    Impression:  Negro is a 81 y. o.year old who  has a past medical history of Arthritis, DVT of deep femoral vein ([address] H/O echocardiogram Limited, History of echocardiogram, Hx of Doppler echocardiogram, Hypertension, PAD (peripheral artery disease) ([facility] [address] Pneumonia, Seizures ([address] and Vertigo. and presents with     Plan:  Chest pain and shortness of breath: abnormal stress test and severe AS on echo and stress test ordered, she has progressive angina most likley from worsening aortic stenosis,Will arrange LHC/RHC, RECOMMED NOT TO EXERT., CONTINEU LASIX  H/o PE: patient has left lung PE , and rt leg DVT, echo showed moderate AS, continue eliquis\  Recurrent thrombosis; she had left leg DVT in past and now rt leg with PE, will continue anticoagulation life long, RECHECK B/L LEG VENOUS DOPPLER  H/o subdural hematoma and headaches now: CT HEAD ordered, she is on xarelto  Rt leg DVT and pain: rechecked doppler, its normal,  no need for venoplasty  Severe AORTIC STENOSIs, LHR/RHC ARRANGED with her about TAVR AND SAVR  HTN: stable, CONTINUE LOPRESSOR  H/o seizures  Deafness: left heat has tympanic perforation  All labs, medications and tests reviewed, continue all other medications of all above medical condition listed as is.     @BATTERIES & BANDSLAUREN@

## 2022-11-10 ENCOUNTER — NURSE ONLY (OUTPATIENT)
Dept: CARDIOLOGY CLINIC | Age: 82
End: 2022-11-10

## 2022-11-10 PROCEDURE — 99999 PR OFFICE/OUTPT VISIT,PROCEDURE ONLY: CPT | Performed by: INTERNAL MEDICINE

## 2022-11-10 NOTE — PROGRESS NOTES
Patient was here in office & educated on [facility] for Dx: SEVERE AS. Procedure is scheduled for 11/15/22 @ 7 AM, w/arrival @ 545 AM, @ [facility]. Pre-admission orders were given to patient for labs & CXR, which are due 11/10/22 OR 11/11/22 @ [facility]. Procedure and risks were explained to patient. Consent forms were signed. Instructions were given to patient to remain NPO after midnight the night before procedure. Patient may take morning meds the morning of procedure with small amount of water. Patient is asked to call hospital @ [phone], 1 to 2 days before procedure to pre-register. Patient was notified that procedure could be delayed due to an emergency. Patient voiced understanding.  Copies of consent, pre-testing orders, & instructions scanned into media

## 2022-11-11 ENCOUNTER — HOSPITAL ENCOUNTER (OUTPATIENT)
Age: 82
Discharge: HOME | End: 2022-11-11
Payer: COMMERCIAL

## 2022-11-11 ENCOUNTER — HOSPITAL ENCOUNTER (OUTPATIENT)
Dept: GENERAL RADIOLOGY | Age: 82
Discharge: HOME | End: 2022-11-11
Payer: COMMERCIAL

## 2022-11-11 DIAGNOSIS — Z01.810 PRE-OPERATIVE CARDIOVASCULAR EXAMINATION: ICD-10-CM

## 2022-11-11 LAB
ANION GAP SERPL CALCULATED.3IONS-SCNC: 9 MMOL/L (ref 4–16)
BUN BLDV-MCNC: 15 MG/DL (ref 6–23)
CALCIUM SERPL-MCNC: 9.7 MG/DL (ref 8.3–10.6)
CHLORIDE BLD-SCNC: 100 MMOL/L (ref 99–110)
CO2: 29 MMOL/L (ref 21–32)
CREAT SERPL-MCNC: 0.6 MG/DL (ref 0.6–1.1)
GFR SERPL CREATININE-BSD FRML MDRD: >60 ML/MIN/1.73M2
GLUCOSE BLD-MCNC: 96 MG/DL (ref 70–99)
HCT VFR BLD CALC: 38.6 % (ref 37–47)
HEMOGLOBIN: 12.1 GM/DL (ref 12.5–16)
MCH RBC QN AUTO: 30.8 PG (ref 27–31)
MCHC RBC AUTO-ENTMCNC: 31.3 % (ref 32–36)
MCV RBC AUTO: 98.2 FL (ref 78–100)
PDW BLD-RTO: 13.2 % (ref 11.7–14.9)
PLATELET # BLD: 246 K/CU MM (ref 140–440)
PMV BLD AUTO: 10.7 FL (ref 7.5–11.1)
POTASSIUM SERPL-SCNC: 4.5 MMOL/L (ref 3.5–5.1)
RBC # BLD: 3.93 M/CU MM (ref 4.2–5.4)
SODIUM BLD-SCNC: 138 MMOL/L (ref 135–145)
WBC # BLD: 5.1 K/CU MM (ref 4–10.5)

## 2022-11-11 PROCEDURE — 85027 COMPLETE CBC AUTOMATED: CPT

## 2022-11-11 PROCEDURE — 71046 X-RAY EXAM CHEST 2 VIEWS: CPT

## 2022-11-11 PROCEDURE — 36415 COLL VENOUS BLD VENIPUNCTURE: CPT

## 2022-11-11 PROCEDURE — 80048 BASIC METABOLIC PNL TOTAL CA: CPT

## 2022-11-14 ENCOUNTER — TELEPHONE (OUTPATIENT)
Dept: CARDIOLOGY CLINIC | Age: 82
End: 2022-11-14

## 2022-11-14 NOTE — TELEPHONE ENCOUNTER
Reminder call. Instructions reviewed. Patient acknowledged understanding.  Reminded of F/U appointment 11/28/22 @ 2:00

## 2022-11-15 ENCOUNTER — HOSPITAL ENCOUNTER (OUTPATIENT)
Dept: CARDIAC CATH/INVASIVE PROCEDURES | Age: 82
Discharge: HOME | End: 2022-11-15
Attending: INTERNAL MEDICINE | Admitting: INTERNAL MEDICINE
Payer: COMMERCIAL

## 2022-11-15 VITALS
HEART RATE: 80 BPM | WEIGHT: 199 LBS | BODY MASS INDEX: 27.86 KG/M2 | DIASTOLIC BLOOD PRESSURE: 78 MMHG | SYSTOLIC BLOOD PRESSURE: 158 MMHG | RESPIRATION RATE: 16 BRPM | OXYGEN SATURATION: 96 % | TEMPERATURE: 97.1 F | HEIGHT: 71 IN

## 2022-11-15 PROBLEM — I35.0 SEVERE AORTIC STENOSIS: Status: ACTIVE | Noted: 2022-11-15

## 2022-11-15 PROCEDURE — 93451 RIGHT HEART CATH: CPT | Performed by: INTERNAL MEDICINE

## 2022-11-15 PROCEDURE — C1751 CATH, INF, PER/CENT/MIDLINE: HCPCS

## 2022-11-15 PROCEDURE — 6370000000 HC RX 637 (ALT 250 FOR IP): Performed by: INTERNAL MEDICINE

## 2022-11-15 PROCEDURE — 2709999900 HC NON-CHARGEABLE SUPPLY

## 2022-11-15 PROCEDURE — C1894 INTRO/SHEATH, NON-LASER: HCPCS

## 2022-11-15 PROCEDURE — 93460 R&L HRT ART/VENTRICLE ANGIO: CPT

## 2022-11-15 PROCEDURE — 6360000002 HC RX W HCPCS

## 2022-11-15 PROCEDURE — 2500000003 HC RX 250 WO HCPCS

## 2022-11-15 PROCEDURE — C1887 CATHETER, GUIDING: HCPCS

## 2022-11-15 PROCEDURE — C1769 GUIDE WIRE: HCPCS

## 2022-11-15 PROCEDURE — 6360000004 HC RX CONTRAST MEDICATION

## 2022-11-15 PROCEDURE — 2580000003 HC RX 258: Performed by: INTERNAL MEDICINE

## 2022-11-15 RX ORDER — DIPHENHYDRAMINE HCL 25 MG
25 TABLET ORAL ONCE
Status: COMPLETED | OUTPATIENT
Start: 2022-11-15 | End: 2022-11-15

## 2022-11-15 RX ORDER — DIAZEPAM 5 MG/1
5 TABLET ORAL ONCE
Status: COMPLETED | OUTPATIENT
Start: 2022-11-15 | End: 2022-11-15

## 2022-11-15 RX ORDER — SODIUM CHLORIDE 9 MG/ML
INJECTION, SOLUTION INTRAVENOUS CONTINUOUS
Status: DISCONTINUED | OUTPATIENT
Start: 2022-11-15 | End: 2022-11-15 | Stop reason: HOSPADM

## 2022-11-15 RX ADMIN — SODIUM CHLORIDE: 9 INJECTION, SOLUTION INTRAVENOUS at 06:27

## 2022-11-15 RX ADMIN — DIPHENHYDRAMINE HYDROCHLORIDE 25 MG: 25 TABLET ORAL at 06:28

## 2022-11-15 RX ADMIN — DIAZEPAM 5 MG: 5 TABLET ORAL at 06:28

## 2022-11-15 NOTE — PROGRESS NOTES
Dc instructions reviewed with pt and family pt verbalizes understanding  r radial drsg dry and intact no hematoma

## 2022-11-15 NOTE — PROGRESS NOTES
Received from cath lab. Monitor and alarms on. Call Light in reach. Bed in the lowest position. Family at bedside. Procedure site assessment completed per H. Facundo NEAL and Constantino Fountain RN. No hematoma or bleeding noted.

## 2022-11-17 NOTE — H&P
Chief complaints: patient is here for management of Chest pain, moderate AS, PE, DVT        Subjective: + chest pain,  leg pain and weakness with walking left leg swelling, + shortness of breath, no dizziness, no palpitations     HPI Negro is a 81 y. o.year old who  has a past medical history of Arthritis, DVT of deep femoral vein ([address] H/O echocardiogram Limited, History of echocardiogram, Hx of Doppler echocardiogram, Hypertension, PAD (peripheral artery disease) ([address] Pneumonia, Seizures ([address] and Vertigo. and presents for management of Chest pain, moderate AS, PE, DVT which are well controlled        Current Facility-Administered Medications          Current Outpatient Medications   Medication Sig Dispense Refill    HYDROcodone-acetaminophen (NORCO) 5-325 MG per tablet TAKE 1 TABLET EVERY 8 HOURS AS NEEDED FOR PAIN AVOID ALCOHOL/CAUSES DROWSINESS        rivaroxaban (XARELTO) 20 MG TABS tablet Take 1 tablet by mouth Daily with supper 30 tablet 0    melatonin 3 MG TABS tablet Take 3 mg by mouth daily        traZODone (DESYREL) 50 MG tablet Take 1 tablet by mouth nightly 30 tablet 0    ondansetron (ZOFRAN-ODT) 4 MG disintegrating tablet Take 1 tablet by mouth every 8 hours as needed for Nausea or Vomiting 10 tablet 0    metoprolol tartrate (LOPRESSOR) 25 MG tablet Take 1 tablet by mouth 2 times daily (Patient taking differently: Take 50 mg by mouth 2 times daily) 60 tablet 3    albuterol sulfate  (90 Base) MCG/ACT inhaler Inhale 2 puffs into the lungs every 6 hours as needed for Wheezing        potassium chloride (KLOR-CON) 20 MEQ packet Take 10 mEq by mouth daily         magnesium 30 MG tablet Take 200 mg by mouth nightly         furosemide (LASIX) 20 MG tablet Take 20 mg by mouth 2 times daily. aspirin 81 MG tablet Take 81 mg by mouth daily. No current facility-administered medications for this visit.          Allergies: Coumadin [warfarin sodium], Ambien [zolpidem tartrate], and Morphine sulfate  Past Medical History        Past Medical History:   Diagnosis Date    Arthritis      DVT of deep femoral vein ([facility] [address] 2016    H/O echocardiogram Limited 2019     MOD AS, JOLENE 1.3 sq    History of echocardiogram 2019     EF >60% mild to moderate TR , MOD AS JOLENE 1.17 sq, Mod PHTN, Grade II DD. Hx of Doppler echocardiogram 2022     EF 55-60% Severe LV hypertrophy. Mildly dilated LA. Heavily calcified aortic valve with severe AS. Mitral annular calcification and the tips of the mitral valve leaflets are calcified. Mild MR, TR, and mod AR. Hypertension      PAD (peripheral artery disease) (HCC)      Pneumonia      Seizures (HCC)      Vertigo           Past Surgical History         Past Surgical History:   Procedure Laterality Date     SECTION        COLONOSCOPY        ENDOSCOPY, COLON, DIAGNOSTIC             Family History         Family History   Problem Relation Age of Onset    Cancer Mother           BREAST BILAT         Social History            Tobacco Use    Smoking status: Former       Packs/day: 0.25       Years: 50.00       Pack years: 12.50       Types: Cigarettes       Quit date: 2022       Years since quittin.2    Smokeless tobacco: Never   Substance Use Topics    Alcohol use: Not Currently       Comment: occ. caffeine: a cup coffee daily.       @[facility]@  Review of Systems:   Constitutional: No Fever or Weight Loss   Eyes: No Decreased Vision  ENT: No Headaches, Hearing Loss or Vertigo  Cardiovascular:+ chest pain, dyspnea on exertion, palpitations or loss of consciousness  Respiratory: No cough or wheezing    Gastrointestinal: No abdominal pain, appetite loss, blood in stools, constipation, diarrhea or heartburn  Genitourinary: No dysuria, trouble voiding, or hematuria  Musculoskeletal:  No gait disturbance, weakness or joint complaints  Integumentary: No rash or pruritis  Neurological: No TIA or stroke symptoms  Psychiatric: No anxiety or depression  Endocrine: No malaise, fatigue or temperature intolerance  Hematologic/Lymphatic: No bleeding problems, blood clots or swollen lymph nodes  Allergic/Immunologic: No nasal congestion or hives  All systems negative except as marked. Objective:  /70 (Site: Left Upper Arm, Position: Sitting, Cuff Size: Medium Adult)   Pulse (!) 116   Ht 5' 11\" (1.803 m)   Wt 199 lb 3.2 oz (90.4 kg)   SpO2 98%   BMI 27.78 kg/m²       Wt Readings from Last 3 Encounters:   10/31/22 199 lb 3.2 oz (90.4 kg)   09/26/22 195 lb (88.5 kg)   12/16/21 185 lb (83.9 kg)      Body mass index is 27.78 kg/m². GENERAL - Alert, oriented, pleasant, in no apparent distress,normal grooming  HEENT - pupils are intact, cornea intact, conjunctive normal color, ears are normal in exam,  Neck - Supple. No jugular venous distention noted. No carotid bruits, no apical -carotid delay  Respiratory:  Normal breath sounds, No respiratory distress, No wheezing, No chest tenderness. ,no use of accessory muscles, diaphragm movement is normal  Cardiovascular: (PMI) apex non displaced,no lifts no thrills, no s3,no s4, Normal heart rate, Normal rhythm, No murmurs, No rubs, No gallops. Carotid arteries pulse and amplitude are normal no bruit, no abdominal bruit noted ( normal abdominal aorta ausculation),   Extremities - No cyanosis, clubbing, or significant edema, no varicose veins    Abdomen - No masses, tenderness, or organomegaly, no hepato-splenomegally, no bruits  Musculoskeletal - No significant edema, no kyphosis or scoliosis, no deformity in any extremity noted, muscle strength and tone are normal  Skin: no ulcer,no scar,no stasis dermatitis   Neurologic - alert oriented times 3,Cranial nerves II through XII are grossly intact. There were no gross focal neurologic abnormalities.    Psychiatric: normal mood and affect           Lab Results   Component Value Date/Time     CKTOTAL 58 01/19/2014 05:23 AM     CKMB 2.1 01/19/2014 12:24 AM TROPONINI 0.007 01/19/2014 05:22 AM     TROPONINI <0.006 04/03/2012 03:01 PM      BNP:          Lab Results   Component Value Date/Time     BNP 45 01/18/2014 06:14 PM      PT/INR:  No results found for: PTINR        Lab Results   Component Value Date     LABA1C 5.8 03/09/2012            Lab Results   Component Value Date     CHOL 231 (H) 03/09/2012     TRIG 96 03/09/2012     HDL 67 03/09/2012     LDLDIRECT 166 (H) 03/09/2012            Lab Results   Component Value Date     ALT <5 (L) 12/17/2021     AST 14 (L) 12/17/2021      TSH:  No results found for: TSH     Impression:  Negro is a 81 y. o.year old who  has a past medical history of Arthritis, DVT of deep femoral vein ([address] H/O echocardiogram Limited, History of echocardiogram, Hx of Doppler echocardiogram, Hypertension, PAD (peripheral artery disease) ([address] Pneumonia, Seizures ([address] and Vertigo. and presents with      Plan:  Chest pain and shortness of breath: abnormal stress test and severe AS on echo and stress test ordered, she has progressive angina most likley from worsening aortic stenosis,Will arrange LHC/RHC, RECOMMED NOT TO EXERT., CONTINEU LASIX  H/o PE: patient has left lung PE , and rt leg DVT, echo showed moderate AS, continue eliquis\  Recurrent thrombosis; she had left leg DVT in past and now rt leg with PE, will continue anticoagulation life long, RECHECK B/L LEG VENOUS DOPPLER  H/o subdural hematoma and headaches now: CT HEAD ordered, she is on xarelto  Rt leg DVT and pain: rechecked doppler, its normal,  no need for venoplasty  Severe AORTIC STENOSIs, LHR/RHC ARRANGED with her about TAVR AND SAVR  HTN: stable, CONTINUE LOPRESSOR  H/o seizures  Deafness: left heat has tympanic perforation  All labs, medications and tests reviewed, continue all other medications of all above medical condition listed as is.      [unfilled]

## 2022-11-28 ENCOUNTER — OFFICE VISIT (OUTPATIENT)
Dept: CARDIOLOGY CLINIC | Age: 82
End: 2022-11-28
Payer: COMMERCIAL

## 2022-11-28 VITALS
BODY MASS INDEX: 26.4 KG/M2 | DIASTOLIC BLOOD PRESSURE: 72 MMHG | HEIGHT: 71 IN | HEART RATE: 113 BPM | SYSTOLIC BLOOD PRESSURE: 112 MMHG | OXYGEN SATURATION: 100 % | WEIGHT: 188.6 LBS

## 2022-11-28 DIAGNOSIS — R06.02 SOB (SHORTNESS OF BREATH): Primary | ICD-10-CM

## 2022-11-28 PROCEDURE — 1090F PRES/ABSN URINE INCON ASSESS: CPT | Performed by: INTERNAL MEDICINE

## 2022-11-28 PROCEDURE — 99214 OFFICE O/P EST MOD 30 MIN: CPT | Performed by: INTERNAL MEDICINE

## 2022-11-28 PROCEDURE — G8417 CALC BMI ABV UP PARAM F/U: HCPCS | Performed by: INTERNAL MEDICINE

## 2022-11-28 PROCEDURE — 1124F ACP DISCUSS-NO DSCNMKR DOCD: CPT | Performed by: INTERNAL MEDICINE

## 2022-11-28 PROCEDURE — G8484 FLU IMMUNIZE NO ADMIN: HCPCS | Performed by: INTERNAL MEDICINE

## 2022-11-28 PROCEDURE — G8400 PT W/DXA NO RESULTS DOC: HCPCS | Performed by: INTERNAL MEDICINE

## 2022-11-28 PROCEDURE — G8427 DOCREV CUR MEDS BY ELIG CLIN: HCPCS | Performed by: INTERNAL MEDICINE

## 2022-11-28 PROCEDURE — 1036F TOBACCO NON-USER: CPT | Performed by: INTERNAL MEDICINE

## 2022-11-28 NOTE — PROGRESS NOTES
Tami Dillon MD        OFFICE  FOLLOWUP NOTE    Chief complaints: patient is here for management of Chest pain, moderate AS, PE, DVT    F/u after LHC/RHC    Subjective: + chest pain, +shortness of breath, no dizziness, no palpitations    SEA Tom is a 81 y. o.year old who  has a past medical history of Arthritis, DVT of deep femoral vein ([facility] [address] H/O echocardiogram Limited, H/O right and left heart catheterization, History of echocardiogram, Hx of Doppler echocardiogram, Hypertension, PAD (peripheral artery disease) ([facility] [address] Pneumonia, Seizures ([address] and Vertigo. and presents for management of Chest pain, moderate AS, PE, DVT which are well controlled      Current Outpatient Medications   Medication Sig Dispense Refill    HYDROcodone-acetaminophen (NORCO) 5-325 MG per tablet       rivaroxaban (XARELTO) 20 MG TABS tablet Take 1 tablet by mouth Daily with supper 30 tablet 0    traZODone (DESYREL) 50 MG tablet Take 1 tablet by mouth nightly 30 tablet 0    ondansetron (ZOFRAN-ODT) 4 MG disintegrating tablet Take 1 tablet by mouth every 8 hours as needed for Nausea or Vomiting 10 tablet 0    metoprolol tartrate (LOPRESSOR) 25 MG tablet Take 1 tablet by mouth 2 times daily (Patient taking differently: Take 50 mg by mouth 2 times daily) 60 tablet 3    albuterol sulfate  (90 Base) MCG/ACT inhaler Inhale 2 puffs into the lungs every 6 hours as needed for Wheezing      potassium chloride (KLOR-CON) 20 MEQ packet Take 10 mEq by mouth daily       magnesium 30 MG tablet Take 200 mg by mouth nightly       furosemide (LASIX) 20 MG tablet Take 20 mg by mouth 2 times daily. aspirin 81 MG tablet Take 81 mg by mouth daily. melatonin 3 MG TABS tablet Take 3 mg by mouth daily (Patient not taking: Reported on 11/28/2022)       No current facility-administered medications for this visit.      Allergies: Coumadin [warfarin sodium], Ambien [zolpidem tartrate], and Morphine sulfate  Past Medical History: Diagnosis Date    Arthritis     DVT of deep femoral vein ([facility] [address] 2016    H/O echocardiogram Limited 2019    MOD AS, JOLENE 1.3 sq    H/O right and left heart catheterization 11/15/2022    Moderate to severe aortic stenosis, normal coronaries. TAVR referral    History of echocardiogram 2019    EF >60% mild to moderate TR , MOD AS JOLENE 1.17 sq, Mod PHTN, Grade II DD. Hx of Doppler echocardiogram 2022    EF 55-60% Severe LV hypertrophy. Mildly dilated LA. Heavily calcified aortic valve with severe AS. Mitral annular calcification and the tips of the mitral valve leaflets are calcified. Mild MR, TR, and mod AR. Hypertension     PAD (peripheral artery disease) (HCC)     Pneumonia     Seizures (HCC)     Vertigo      Past Surgical History:   Procedure Laterality Date     SECTION      COLONOSCOPY      ENDOSCOPY, COLON, DIAGNOSTIC       Family History   Problem Relation Age of Onset    Cancer Mother         BREAST BILAT     Social History     Tobacco Use    Smoking status: Former     Packs/day: 0.25     Years: 50.00     Pack years: 12.50     Types: Cigarettes     Quit date: 2022     Years since quittin.3    Smokeless tobacco: Never   Substance Use Topics    Alcohol use: Not Currently     Alcohol/week: 2.0 standard drinks     Types: 2 Glasses of wine per week     Comment: occ. caffeine: a cup coffee daily.       @[facility]@  Review of Systems:   Constitutional: No Fever or Weight Loss   Eyes: No Decreased Vision  ENT: No Headaches, Hearing Loss or Vertigo  Cardiovascular: + chest pain,+ dyspnea on exertion, palpitations or loss of consciousness  Respiratory: No cough or wheezing    Gastrointestinal: No abdominal pain, appetite loss, blood in stools, constipation, diarrhea or heartburn  Genitourinary: No dysuria, trouble voiding, or hematuria  Musculoskeletal:  No gait disturbance, weakness or joint complaints  Integumentary: No rash or pruritis  Neurological: No TIA or stroke symptoms  Psychiatric: No anxiety or depression  Endocrine: No malaise, fatigue or temperature intolerance  Hematologic/Lymphatic: No bleeding problems, blood clots or swollen lymph nodes  Allergic/Immunologic: No nasal congestion or hives  All systems negative except as marked. Objective:  /72 (Site: Left Upper Arm, Position: Sitting, Cuff Size: Medium Adult)   Pulse (!) 113   Ht 5' 11\" (1.803 m)   Wt 188 lb 9.6 oz (85.5 kg)   SpO2 100%   BMI 26.30 kg/m²   Wt Readings from Last 3 Encounters:   11/28/22 188 lb 9.6 oz (85.5 kg)   11/15/22 199 lb (90.3 kg)   10/31/22 199 lb 3.2 oz (90.4 kg)     Body mass index is 26.3 kg/m². GENERAL - Alert, oriented, pleasant, in no apparent distress,normal grooming  HEENT - pupils are intact, cornea intact, conjunctive normal color, ears are normal in exam,  Neck - Supple. No jugular venous distention noted. No carotid bruits, no apical -carotid delay  Respiratory:  Normal breath sounds, No respiratory distress, No wheezing, No chest tenderness. ,no use of accessory muscles, diaphragm movement is normal  Cardiovascular: (PMI) apex non displaced,no lifts no thrills, no s3,no s4, Normal heart rate, Normal rhythm, + murmurs, No rubs, No gallops. Carotid arteries pulse and amplitude are normal no bruit, no abdominal bruit noted ( normal abdominal aorta ausculation),   Extremities - No cyanosis, clubbing, or significant edema, no varicose veins    Abdomen - No masses, tenderness, or organomegaly, no hepato-splenomegally, no bruits  Musculoskeletal - No significant edema, no kyphosis or scoliosis, no deformity in any extremity noted, muscle strength and tone are normal  Skin: no ulcer,no scar,no stasis dermatitis   Neurologic - alert oriented times 3,Cranial nerves II through XII are grossly intact. There were no gross focal neurologic abnormalities.    Psychiatric: normal mood and affect    Lab Results   Component Value Date/Time    CKTOTAL 58 01/19/2014 05:23 AM    CKMB 2.1 01/19/2014 12:24 AM    TROPONINI 0.007 01/19/2014 05:22 AM    TROPONINI <0.006 04/03/2012 03:01 PM     BNP:    Lab Results   Component Value Date/Time    BNP 45 01/18/2014 06:14 PM     PT/INR:  No results found for: PTINR  Lab Results   Component Value Date    LABA1C 5.8 03/09/2012     Lab Results   Component Value Date    CHOL 231 (H) 03/09/2012    TRIG 96 03/09/2012    HDL 67 03/09/2012    LDLDIRECT 166 (H) 03/09/2012     Lab Results   Component Value Date    ALT <5 (L) 12/17/2021    AST 14 (L) 12/17/2021     TSH:  No results found for: TSH    Impression:  Negro is a 81 y. o.year old who  has a past medical history of Arthritis, DVT of deep femoral vein ([address] H/O echocardiogram Limited, H/O right and left heart catheterization, History of echocardiogram, Hx of Doppler echocardiogram, Hypertension, PAD (peripheral artery disease) ([address] Pneumonia, Seizures ([address] and Vertigo. and presents with     Plan:  Chest pain and shortness of breath: LHC is normal, has severe AS, VALVE AREA AS PER [facility] IS 0.83 MEAN GRADIENT 33MMhG, AND ON ECHO, maria esther is 0.6 and mean gradient 41mmHg, referred to TAVR clinic. RECOMMED NOT TO EXERT., CONTINEU LASIX,  H/o PE: patient has left lung PE , and rt leg DVT, echo showed SEVERE AS, continue XARELTO\, REFILL PROVIDED  Recurrent thrombosis; she had left leg DVT in past and now rt leg with PE, will continue anticoagulation life long, RECHECK B/L LEG VENOUS DOPPLER  H/o subdural hematoma and headaches now: CT HEAD ordered, she is on xarelto  Rt leg DVT and pain: rechecked doppler, its normal,  no need for venoplasty  Severe AORTIC STENOSIs, LHR/RHC ARRANGED with her about TAVR AND SAVR  HTN: stable, CONTINUE LOPRESSOR  H/o seizures  Deafness: left heat has tympanic perforation  Health maintenance: exerise and diet  All labs, medications and tests reviewed, continue all other medications of all above medical condition listed as is.     [unfilled]

## 2022-11-28 NOTE — PATIENT INSTRUCTIONS
Thank you for allowing us to care for you today! We want to ensure we can follow your treatment plan and we strive to give you the best outcomes and experience possible. If you ever have a life threatening emergency and call 911 - for an ambulance (EMS)   Our providers can only care for you at:   [facility] or [facility]. Even if you have someone take you or you drive yourself we can only care for you in a [facility] facility. Our providers are not setup at the other healthcare locations! Please be informed that if you contact our office outside of normal business hours the physician on call cannot help with any scheduling or rescheduling issues, procedure instruction questions or any type of medication issue. We advise you for any urgent/emergency that you go to the nearest emergency room! PLEASE CALL OUR OFFICE DURING NORMAL BUSINESS HOURS    Monday - Friday   8 am to 5 pm    [place] [address]: [phone]    [place]:  [phone]  **It is YOUR responsibilty to bring medication bottles and/or updated medication list to [address] [place] [address]. This will allow us to better serve you and all your healthcare needs**  [facility] Locations - No appointment necessary. Sites open Monday to Friday. Call your preferred location for test preparation, business hours and other information you need. [employer] accepts [employer]. [facility] [address] Alice Pierson, [address] [facility]  Phone: [phone] [facility] [address] [facility]  [address].   Leigh Ann Tirado  Phone: [phone]

## 2022-12-20 ENCOUNTER — TELEPHONE (OUTPATIENT)
Dept: CARDIOLOGY CLINIC | Age: 82
End: 2022-12-20

## 2023-01-12 ENCOUNTER — HOSPITAL ENCOUNTER (OUTPATIENT)
Dept: CARDIAC CATH/INVASIVE PROCEDURES | Age: 83
Discharge: HOME | End: 2023-01-12
Attending: INTERNAL MEDICINE | Admitting: INTERNAL MEDICINE
Payer: MEDICAID

## 2023-01-12 ENCOUNTER — APPOINTMENT (OUTPATIENT)
Dept: CT IMAGING | Age: 83
End: 2023-01-12
Payer: MEDICAID

## 2023-01-12 ENCOUNTER — HOSPITAL ENCOUNTER (OUTPATIENT)
Age: 83
Discharge: HOME | End: 2023-01-12
Payer: MEDICAID

## 2023-01-12 ENCOUNTER — HOSPITAL ENCOUNTER (OUTPATIENT)
Dept: PULMONOLOGY | Age: 83
Discharge: HOME | End: 2023-01-12
Payer: MEDICAID

## 2023-01-12 VITALS
BODY MASS INDEX: 27.3 KG/M2 | WEIGHT: 195 LBS | OXYGEN SATURATION: 99 % | HEIGHT: 71 IN | DIASTOLIC BLOOD PRESSURE: 69 MMHG | SYSTOLIC BLOOD PRESSURE: 121 MMHG | HEART RATE: 96 BPM | RESPIRATION RATE: 14 BRPM

## 2023-01-12 LAB
ALBUMIN SERPL-MCNC: 4.2 GM/DL (ref 3.4–5)
ALP BLD-CCNC: 87 IU/L (ref 40–129)
ALT SERPL-CCNC: 10 U/L (ref 10–40)
ANION GAP SERPL CALCULATED.3IONS-SCNC: 9 MMOL/L (ref 4–16)
APTT: 44.6 SECONDS (ref 25.1–37.1)
AST SERPL-CCNC: 15 IU/L (ref 15–37)
BILIRUB SERPL-MCNC: 0.4 MG/DL (ref 0–1)
BUN BLDV-MCNC: 21 MG/DL (ref 6–23)
CALCIUM SERPL-MCNC: 9.7 MG/DL (ref 8.3–10.6)
CHLORIDE BLD-SCNC: 105 MMOL/L (ref 99–110)
CO2: 24 MMOL/L (ref 21–32)
CREAT SERPL-MCNC: 0.6 MG/DL (ref 0.6–1.1)
EKG ATRIAL RATE: 89 BPM
EKG DIAGNOSIS: NORMAL
EKG P-R INTERVAL: 176 MS
EKG Q-T INTERVAL: 374 MS
EKG QRS DURATION: 94 MS
EKG QTC CALCULATION (BAZETT): 455 MS
EKG R AXIS: 154 DEGREES
EKG T AXIS: 133 DEGREES
EKG VENTRICULAR RATE: 89 BPM
GFR SERPL CREATININE-BSD FRML MDRD: >60 ML/MIN/1.73M2
GLUCOSE BLD-MCNC: 99 MG/DL (ref 70–99)
HCT VFR BLD CALC: 40.7 % (ref 37–47)
HEMOGLOBIN: 12.8 GM/DL (ref 12.5–16)
INR BLD: 1.24 INDEX
MCH RBC QN AUTO: 30.3 PG (ref 27–31)
MCHC RBC AUTO-ENTMCNC: 31.4 % (ref 32–36)
MCV RBC AUTO: 96.2 FL (ref 78–100)
PDW BLD-RTO: 13.1 % (ref 11.7–14.9)
PLATELET # BLD: 205 K/CU MM (ref 140–440)
PMV BLD AUTO: 9.5 FL (ref 7.5–11.1)
POTASSIUM SERPL-SCNC: 4.4 MMOL/L (ref 3.5–5.1)
PRO-BNP: 143.1 PG/ML
PROTHROMBIN TIME: 16 SECONDS (ref 11.7–14.5)
RBC # BLD: 4.23 M/CU MM (ref 4.2–5.4)
SODIUM BLD-SCNC: 138 MMOL/L (ref 135–145)
TOTAL PROTEIN: 7 GM/DL (ref 6.4–8.2)
WBC # BLD: 5.4 K/CU MM (ref 4–10.5)

## 2023-01-12 PROCEDURE — 93005 ELECTROCARDIOGRAM TRACING: CPT | Performed by: INTERNAL MEDICINE

## 2023-01-12 PROCEDURE — 85610 PROTHROMBIN TIME: CPT

## 2023-01-12 PROCEDURE — 36415 COLL VENOUS BLD VENIPUNCTURE: CPT

## 2023-01-12 PROCEDURE — 85027 COMPLETE CBC AUTOMATED: CPT

## 2023-01-12 PROCEDURE — 94010 BREATHING CAPACITY TEST: CPT

## 2023-01-12 PROCEDURE — 85730 THROMBOPLASTIN TIME PARTIAL: CPT

## 2023-01-12 PROCEDURE — 83880 ASSAY OF NATRIURETIC PEPTIDE: CPT

## 2023-01-12 PROCEDURE — 80053 COMPREHEN METABOLIC PANEL: CPT

## 2023-01-12 NOTE — CONSULTS
Name:  Danish Rosales /Age/Sex: 1940  (81 y.o. female)   MRN & CSN:  [id number] & [id number] Admission Date/Time: 2023  7:05 AM   Location:  Cath Lab/NONE PCP: Bebeto Macdonald Day: 1          Referring physician:  Paola Santiago MD         Reason for consultation:  ***        Thanks for referral.    Information source: {Information source:00661}    CC;  ***      HPI:   Thank you for involving me in taking  care of Danish Rosales who  is a 81 y. o.year  Old female  Presents with  ***                     Past medical history:    has a past medical history of Arthritis, DVT of deep femoral vein ([address] H/O echocardiogram Limited, H/O right and left heart catheterization, History of echocardiogram, Hx of Doppler echocardiogram, Hypertension, PAD (peripheral artery disease) ([address] Pneumonia, Seizures ([address] and Vertigo. Past surgical history:   has a past surgical history that includes  section; Endoscopy, colon, diagnostic; and Colonoscopy. Social History:   reports that she quit smoking about 5 months ago. Her smoking use included cigarettes. She has a 12.50 pack-year smoking history. She has never used smokeless tobacco. She reports that she does not currently use alcohol after a past usage of about 2.0 standard drinks per week. She reports that she does not use drugs. Family history:  family history includes Cancer in her mother. Allergies   Allergen Reactions    Coumadin [Warfarin Sodium] Hives     hives    Ambien [Zolpidem Tartrate] Other (See Comments)     hallucinations    Morphine Sulfate Other (See Comments)     agitation       No current facility-administered medications for this encounter. No current facility-administered medications for this encounter.      Current Outpatient Medications   Medication Sig Dispense Refill    rivaroxaban (XARELTO) 20 MG TABS tablet Take 1 tablet by mouth Daily with supper 30 tablet 0    HYDROcodone-acetaminophen (NORCO) 5-325 MG per tablet       melatonin 3 MG TABS tablet Take 3 mg by mouth daily (Patient not taking: No sig reported)      traZODone (DESYREL) 50 MG tablet Take 1 tablet by mouth nightly 30 tablet 0    ondansetron (ZOFRAN-ODT) 4 MG disintegrating tablet Take 1 tablet by mouth every 8 hours as needed for Nausea or Vomiting 10 tablet 0    metoprolol tartrate (LOPRESSOR) 25 MG tablet Take 1 tablet by mouth 2 times daily (Patient taking differently: Take 50 mg by mouth 2 times daily) 60 tablet 3    albuterol sulfate  (90 Base) MCG/ACT inhaler Inhale 2 puffs into the lungs every 6 hours as needed for Wheezing      potassium chloride (KLOR-CON) 20 MEQ packet Take 10 mEq by mouth daily       magnesium 30 MG tablet Take 200 mg by mouth nightly       furosemide (LASIX) 20 MG tablet Take 20 mg by mouth 2 times daily. aspirin 81 MG tablet Take 81 mg by mouth daily. Review of Systems:  All 14 systems reviewed, all negative except for  ***    Physical Examination:    /69   Pulse 96   Resp 14   Ht 5' 11\" (1.803 m)   Wt 195 lb (88.5 kg)   SpO2 99%   BMI 27.20 kg/m²      Wt Readings from Last 3 Encounters:   01/12/23 195 lb (88.5 kg)   11/28/22 188 lb 9.6 oz (85.5 kg)   11/15/22 199 lb (90.3 kg)     Body mass index is 27.2 kg/m². General Appearance:  ***  Head: normocephalic     Eyes: normal, noninjected conjunctiva    ENT: normal mucosa, noninjected throat, normal     NECK: No JVP  No thyromegaly        Cardiovascular: No thrills palpated   Auscultation: Normal S1 and S2,  *** murmur   carotid bruit ***   Abdominal Aorta no bruit    Respiratory:    Breath sounds {BREATH SOUNDS:83221}    Extremities:  {Numbers; edema:48118} Edema clubbing ,   *** cyanosis    SKIN: Warm and well perfused, no pallor or cyanosis    Vascular exam:  Pedal Pulses: ***  bilaterally        Abdomen:  No masses or tenderness. No organomegaly noted.     Neurological:  Oriented to time, place, and person   No focal neurological deficit noted.   Psychiatric:normal mood, no anxiety    Lab Review   Recent Results (from the past 24 hour(s))   CBC    Collection Time: 01/12/23  7:18 AM   Result Value Ref Range    WBC 5.4 4.0 - 10.5 K/CU MM    RBC 4.23 4.2 - 5.4 M/CU MM    Hemoglobin 12.8 12.5 - 16.0 GM/DL    Hematocrit 40.7 37 - 47 %    MCV 96.2 78 - 100 FL    MCH 30.3 27 - 31 PG    MCHC 31.4 (L) 32.0 - 36.0 %    RDW 13.1 11.7 - 14.9 %    Platelets 280 669 - 487 K/CU MM    MPV 9.5 7.5 - 11.1 FL   Comprehensive Metabolic Panel    Collection Time: 01/12/23  7:18 AM   Result Value Ref Range    Sodium 138 135 - 145 MMOL/L    Potassium 4.4 3.5 - 5.1 MMOL/L    Chloride 105 99 - 110 mMol/L    CO2 24 21 - 32 MMOL/L    BUN 21 6 - 23 MG/DL    Creatinine 0.6 0.6 - 1.1 MG/DL    Est, Glom Filt Rate >60 >60 mL/min/1.73m2    Glucose 99 70 - 99 MG/DL    Calcium 9.7 8.3 - 10.6 MG/DL    Albumin 4.2 3.4 - 5.0 GM/DL    Total Protein 7.0 6.4 - 8.2 GM/DL    Total Bilirubin 0.4 0.0 - 1.0 MG/DL    ALT 10 10 - 40 U/L    AST 15 15 - 37 IU/L    Alkaline Phosphatase 87 40 - 129 IU/L    Anion Gap 9 4 - 16   Brain Natriuretic Peptide    Collection Time: 01/12/23  7:18 AM   Result Value Ref Range    Pro-.1 <300 PG/ML   Protime/INR & PTT    Collection Time: 01/12/23  7:18 AM   Result Value Ref Range    Protime 16.0 (H) 11.7 - 14.5 SECONDS    INR 1.24 INDEX    aPTT 44.6 (H) 25.1 - 37.1 SECONDS   EKG 12 Lead    Collection Time: 01/12/23  8:17 AM   Result Value Ref Range    Ventricular Rate 89 BPM    Atrial Rate 89 BPM    P-R Interval 176 ms    QRS Duration 94 ms    Q-T Interval 374 ms    QTc Calculation (Bazett) 455 ms    R Axis 154 degrees    T Axis 133 degrees    Diagnosis       Normal sinus rhythm  Right axis deviation  Minimal voltage criteria for LVH, may be normal variant  Abnormal ECG  When compared with ECG of 17-DEC-2021 03:46,  QRS axis shifted right  T wave inversion no longer evident in Anterior leads  T wave inversion now evident in Lateral leads             Assessment/Recommendations:              Caro Aponte MD, 1/12/2023 10:01 AM       Please note this report has been partially produced using speech recognition software and may contain errors related to that system including errors in grammar, punctuation, and spelling, as well as words and phrases that may be inappropriate. If there are any questions or concerns please feel free to contact the dictating provider for clarification.

## 2023-01-13 ENCOUNTER — HOSPITAL ENCOUNTER (OUTPATIENT)
Dept: CT IMAGING | Age: 83
Discharge: HOME | End: 2023-01-13
Payer: MEDICAID

## 2023-01-13 PROCEDURE — 74174 CTA ABD&PLVS W/CONTRAST: CPT

## 2023-01-13 PROCEDURE — 6360000004 HC RX CONTRAST MEDICATION: Performed by: INTERNAL MEDICINE

## 2023-01-13 PROCEDURE — 75574 CT ANGIO HRT W/3D IMAGE: CPT

## 2023-01-13 RX ADMIN — IOPAMIDOL 80 ML: 755 INJECTION, SOLUTION INTRAVENOUS at 15:22

## 2023-01-17 ENCOUNTER — TELEPHONE (OUTPATIENT)
Dept: CARDIOTHORACIC SURGERY | Age: 83
End: 2023-01-17

## 2023-01-17 ENCOUNTER — INITIAL CONSULT (OUTPATIENT)
Dept: CARDIOTHORACIC SURGERY | Age: 83
End: 2023-01-17
Payer: COMMERCIAL

## 2023-01-17 VITALS
WEIGHT: 209.6 LBS | HEART RATE: 104 BPM | HEIGHT: 71 IN | SYSTOLIC BLOOD PRESSURE: 124 MMHG | DIASTOLIC BLOOD PRESSURE: 70 MMHG | OXYGEN SATURATION: 99 % | BODY MASS INDEX: 29.34 KG/M2

## 2023-01-17 DIAGNOSIS — I35.0 SEVERE AORTIC STENOSIS: Primary | ICD-10-CM

## 2023-01-17 PROCEDURE — G8484 FLU IMMUNIZE NO ADMIN: HCPCS | Performed by: THORACIC SURGERY (CARDIOTHORACIC VASCULAR SURGERY)

## 2023-01-17 PROCEDURE — 1036F TOBACCO NON-USER: CPT | Performed by: THORACIC SURGERY (CARDIOTHORACIC VASCULAR SURGERY)

## 2023-01-17 PROCEDURE — 99205 OFFICE O/P NEW HI 60 MIN: CPT | Performed by: THORACIC SURGERY (CARDIOTHORACIC VASCULAR SURGERY)

## 2023-01-17 PROCEDURE — 1124F ACP DISCUSS-NO DSCNMKR DOCD: CPT | Performed by: THORACIC SURGERY (CARDIOTHORACIC VASCULAR SURGERY)

## 2023-01-17 PROCEDURE — G8400 PT W/DXA NO RESULTS DOC: HCPCS | Performed by: THORACIC SURGERY (CARDIOTHORACIC VASCULAR SURGERY)

## 2023-01-17 PROCEDURE — 1090F PRES/ABSN URINE INCON ASSESS: CPT | Performed by: THORACIC SURGERY (CARDIOTHORACIC VASCULAR SURGERY)

## 2023-01-17 PROCEDURE — G8417 CALC BMI ABV UP PARAM F/U: HCPCS | Performed by: THORACIC SURGERY (CARDIOTHORACIC VASCULAR SURGERY)

## 2023-01-17 PROCEDURE — G8427 DOCREV CUR MEDS BY ELIG CLIN: HCPCS | Performed by: THORACIC SURGERY (CARDIOTHORACIC VASCULAR SURGERY)

## 2023-01-17 NOTE — PATIENT INSTRUCTIONS
Please be informed that if you contact our office outside of normal business hours the physician on call cannot help with any scheduling or rescheduling issues, procedure instruction questions or any type of medication issue.    We advise you for any urgent/emergency that you go to the nearest emergency room!    PLEASE CALL OUR OFFICE DURING NORMAL BUSINESS HOURS    Monday - Friday   8 am to 5 pm    [place]: [phone]    [place]: [phone]    [place]:  [phone]    **It is YOUR responsibilty to bring medication bottles and/or updated medication list to EACH APPOINTMENT. This will allow us to better serve you and all your healthcare needs**    Thank you for allowing us to care for you today!   We want to ensure we can follow your treatment plan and we strive to give you the best outcomes and experience possible.   If you ever have a life threatening emergency and call 911 - for an ambulance (EMS)   Our providers can only care for you at:   [facility] or [facility].   Even if you have someone take you or you drive yourself we can only care for you in a [facility] facility. Our providers are not setup at the other healthcare locations!

## 2023-01-18 NOTE — PROGRESS NOTES
Cardiothoracic Surgery     History & Physical    2023    Patient Name: Brandon Conde : 1940     ATTENDING PHYSICIAN: Dr. Teofilo Olivier MD    REFERRING PHYSICIAN: Dr. Vashti Wang: Severe AS    CARDIOLOGIST: Dr. Teofilo Olivier    CC:  Severe aortic stenosis    HPI  Brandon Conde is a 81 y.o. female with PMH of DVT, HTN, PAD, tobacco abuse, and severe AS. She is here today for evaluation for possible TAVR. Patient states she has experienced worsening fatigue and SOB recently. She is able to ambulate with a FWW but does easily fatigue. She does have family support at home. LHC shows normal coronary arteries and severe aortic stenosis. Echo also shows severe AS      PMHx  Past Medical History:   Diagnosis Date    Arthritis     DVT of deep femoral vein ([facility] [address] 2016    H/O echocardiogram Limited 2019    MOD AS, JOLENE 1.3 sq    H/O right and left heart catheterization 11/15/2022    Moderate to severe aortic stenosis, normal coronaries. TAVR referral    History of echocardiogram 2019    EF >60% mild to moderate TR , MOD AS JOLENE 1.17 sq, Mod PHTN, Grade II DD. Hx of Doppler echocardiogram 2022    EF 55-60% Severe LV hypertrophy. Mildly dilated LA. Heavily calcified aortic valve with severe AS. Mitral annular calcification and the tips of the mitral valve leaflets are calcified. Mild MR, TR, and mod AR. Hypertension     PAD (peripheral artery disease) (HCC)     Pneumonia     Seizures (HCC)     Vertigo        PSHx  Past Surgical History:   Procedure Laterality Date     SECTION      COLONOSCOPY      ENDOSCOPY, COLON, DIAGNOSTIC         Social Hx  Social History     Socioeconomic History    Marital status:       Spouse name: Not on file    Number of children: Not on file    Years of education: Not on file    Highest education level: Not on file   Occupational History    Not on file   Tobacco Use    Smoking status: Former     Packs/day: 0.25     Years: 50.00     Pack years: 12.50     Types: Cigarettes     Quit date: 2022     Years since quittin.4    Smokeless tobacco: Never   Vaping Use    Vaping Use: Never used   Substance and Sexual Activity    Alcohol use: Not Currently     Alcohol/week: 2.0 standard drinks     Types: 2 Glasses of wine per week     Comment: occ. caffeine: a cup coffee daily.    Drug use: No    Sexual activity: Not Currently   Other Topics Concern    Not on file   Social History Narrative    Not on file     Social Determinants of Health     Financial Resource Strain: Not on file   Food Insecurity: Not on file   Transportation Needs: Not on file   Physical Activity: Not on file   Stress: Not on file   Social Connections: Not on file   Intimate Partner Violence: Not on file   Housing Stability: Not on file       FHx  Family History   Problem Relation Age of Onset    Cancer Mother         BREAST BILAT        Allergies  Allergies   Allergen Reactions    Coumadin [Warfarin Sodium] Hives     hives    Ambien [Zolpidem Tartrate] Other (See Comments)     hallucinations    Morphine Sulfate Other (See Comments)     agitation       Current Medications    Current Outpatient Medications:     rivaroxaban (XARELTO) 20 MG TABS tablet, Take 1 tablet by mouth Daily with supper, Disp: 30 tablet, Rfl: 0    HYDROcodone-acetaminophen (NORCO) 5-325 MG per tablet, , Disp: , Rfl:     traZODone (DESYREL) 50 MG tablet, Take 1 tablet by mouth nightly, Disp: 30 tablet, Rfl: 0    ondansetron (ZOFRAN-ODT) 4 MG disintegrating tablet, Take 1 tablet by mouth every 8 hours as needed for Nausea or Vomiting, Disp: 10 tablet, Rfl: 0    metoprolol tartrate (LOPRESSOR) 25 MG tablet, Take 1 tablet by mouth 2 times daily (Patient taking differently: Take 50 mg by mouth 2 times daily), Disp: 60 tablet, Rfl: 3    albuterol sulfate  (90 Base) MCG/ACT inhaler, Inhale 2 puffs into the lungs every 6 hours as needed for Wheezing, Disp: , Rfl:     potassium chloride (KLOR-CON) 20 MEQ packet, Take  10 mEq by mouth daily , Disp: , Rfl:     magnesium 30 MG tablet, Take 200 mg by mouth nightly , Disp: , Rfl:     furosemide (LASIX) 20 MG tablet, Take 20 mg by mouth 2 times daily. , Disp: , Rfl:     aspirin 81 MG tablet, Take 81 mg by mouth daily. , Disp: , Rfl:     Review of Systems    GENERAL: +fatigue and decreased activity  HEENT: negative  PULMONARY: +SOB and SALCEDO  CARDIOVASCULAR: + chest pain  GI: negative  :negative  MUSCULOSKELETAL: negative  NEURO: negative  INTEGUMENTARY: negative  PSYCHIATRIC: negative  ENDOCRINE:  negative  HEMATOLOGIC: negative     Exam  Vital Signs:  Vitals:    23 1326   BP: 124/70   Pulse: (!) 104   SpO2: 99%     Body mass index is 29.23 kg/m². GEN:  WDWN, NAD, pleasant and conversational, utilizing FWW  Neck:  supple, no carotid bruits, no JVD  ENT: BENNIE  Chest: equal excursion  Lung:  CTAB  Heart: RRR, +systolic murmur  Abd: soft, NT,ND, +BS  Ext: normal ROM, no BLE edema  Neuro: no focal deficits  Skin: Warm and dry, no rashes or lesions    Left Cardiac Cath:  11/15/22: moderate to severe aortic stenosis, normal coronaries, aberrant origin of RCA, maria esther 0.83 MEAN gradient 33 mmHg       Echocardiogram:   Heavily calcified aortic valve with severe aortic stenosis; mean PmmHG. MARIA ESTHER: 0.63cm2. DVI: 0.2. LVEF 55%      Assessment:  Severe aortic stenosis  Given patient's age and severity of symptoms, would recommend TAVR. High risk for SAVR        Plan    Agree with TAVR. Continue workup as planned.     Juan Ruiz PA-C 23 2:26 PM          New Consults 8:00AM-4:30PM: Call Office , 4:30PM to 8:00AM Surgeon on-call    HVICU or other units patient follow up: Secure chat author of this note 8:00AM-4:30PM    HVICU patient follow up: 4:30PM to 8:00AM Call or Page Surgeon on-call,     Step-down patient follow up: 4:30PM to 8:00AM Page or secure chat PA on-call       I have reviewed the HPI, past medical, surgical, family, and social history sections including the medications and allergies listed in the above medical record. I have also reviewed and discussed the KELLY documentation. I verified and agreed with the above documentation.     Ahmet Richardson MD 01/19/23 8:54 AM

## 2023-01-31 ENCOUNTER — ANESTHESIA EVENT (OUTPATIENT)
Dept: CARDIAC CATH/INVASIVE PROCEDURES | Age: 83
DRG: 267 | End: 2023-01-31
Payer: COMMERCIAL

## 2023-01-31 PROBLEM — I38 VHD (VALVULAR HEART DISEASE): Status: ACTIVE | Noted: 2023-01-31

## 2023-01-31 NOTE — ANESTHESIA PRE PROCEDURE
Department of Anesthesiology  Preprocedure Note       Name:  Robert Riggs   Age:  81 y.o.  :  1940                                          MRN:  [id number]         Date:  2023      Surgeon: * Surgery not found *    Procedure:     Medications prior to admission:   Prior to Admission medications    Medication Sig Start Date End Date Taking? Authorizing Provider   rivaroxaban (XARELTO) 20 MG TABS tablet Take 1 tablet by mouth Daily with supper 22   Delisa Gutierrez MD   HYDROcodone-acetaminophen [facility]) 5-325 MG per tablet  10/10/22   Historical Provider, MD   traZODone (DESYREL) 50 MG tablet Take 1 tablet by mouth nightly 21   Leslie Garcia MD   ondansetron (ZOFRAN-ODT) 4 MG disintegrating tablet Take 1 tablet by mouth every 8 hours as needed for Nausea or Vomiting 21   Leslie Garcia MD   metoprolol tartrate (LOPRESSOR) 25 MG tablet Take 1 tablet by mouth 2 times daily  Patient taking differently: Take 50 mg by mouth 2 times daily 21   Leslie Garcia MD   albuterol sulfate  (90 Base) MCG/ACT inhaler Inhale 2 puffs into the lungs every 6 hours as needed for Wheezing    Historical Provider, MD   potassium chloride (KLOR-CON) 20 MEQ packet Take 10 mEq by mouth daily     Historical Provider, MD   magnesium 30 MG tablet Take 200 mg by mouth nightly     Historical Provider, MD   furosemide (LASIX) 20 MG tablet Take 20 mg by mouth 2 times daily. Historical Provider, MD   aspirin 81 MG tablet Take 81 mg by mouth daily.     Historical Provider, MD       Current medications:    Current Outpatient Medications   Medication Sig Dispense Refill    rivaroxaban (XARELTO) 20 MG TABS tablet Take 1 tablet by mouth Daily with supper 30 tablet 0    HYDROcodone-acetaminophen (NORCO) 5-325 MG per tablet       traZODone (DESYREL) 50 MG tablet Take 1 tablet by mouth nightly 30 tablet 0    ondansetron (ZOFRAN-ODT) 4 MG disintegrating tablet Take 1 tablet by mouth every 8 hours as needed for Nausea or Vomiting 10 tablet 0    metoprolol tartrate (LOPRESSOR) 25 MG tablet Take 1 tablet by mouth 2 times daily (Patient taking differently: Take 50 mg by mouth 2 times daily) 60 tablet 3    albuterol sulfate  (90 Base) MCG/ACT inhaler Inhale 2 puffs into the lungs every 6 hours as needed for Wheezing      potassium chloride (KLOR-CON) 20 MEQ packet Take 10 mEq by mouth daily       magnesium 30 MG tablet Take 200 mg by mouth nightly       furosemide (LASIX) 20 MG tablet Take 20 mg by mouth 2 times daily.  aspirin 81 MG tablet Take 81 mg by mouth daily. No current facility-administered medications for this encounter. Allergies: Allergies   Allergen Reactions    Coumadin [Warfarin Sodium] Hives     hives    Ambien [Zolpidem Tartrate] Other (See Comments)     hallucinations    Morphine Sulfate Other (See Comments)     agitation       Problem List:    Patient Active Problem List   Diagnosis Code    Coumadin toxicity T45.511A    Pulmonary embolism and infarction (HCC) I26.99    DVT (deep vein thrombosis) in pregnancy O22.30    Acute deep vein thrombosis (DVT) of right lower extremity ([facility]) I82.401    Precordial pain R07.2    DVT of axillary vein, acute left ([address] I82. A12    Severe aortic stenosis I35.0       Past Medical History:        Diagnosis Date    Arthritis     DVT of deep femoral vein ([address] 07/13/2016    H/O echocardiogram Limited 11/19/2019    MOD AS, JOLENE 1.3 sq    H/O right and left heart catheterization 11/15/2022    Moderate to severe aortic stenosis, normal coronaries. TAVR referral    History of echocardiogram 06/18/2019    EF >60% mild to moderate TR , MOD AS JOLENE 1.17 sq, Mod PHTN, Grade II DD.  Hx of Doppler echocardiogram 09/30/2022    EF 55-60% Severe LV hypertrophy. Mildly dilated LA. Heavily calcified aortic valve with severe AS. Mitral annular calcification and the tips of the mitral valve leaflets are calcified.  Mild MR, TR, and mod AR.    Hypertension     PAD (peripheral artery disease) (HCC)     Pneumonia     Seizures (HCC)     Vertigo        Past Surgical History:        Procedure Laterality Date     SECTION      COLONOSCOPY      ENDOSCOPY, COLON, DIAGNOSTIC         Social History:    Social History     Tobacco Use    Smoking status: Former     Packs/day: 0.25     Years: 50.00     Pack years: 12.50     Types: Cigarettes     Quit date: 2022     Years since quittin.5    Smokeless tobacco: Never   Substance Use Topics    Alcohol use: Not Currently     Alcohol/week: 2.0 standard drinks     Types: 2 Glasses of wine per week     Comment: occ. caffeine: a cup coffee daily. Counseling given: Not Answered      Vital Signs (Current): There were no vitals filed for this visit.                                            BP Readings from Last 3 Encounters:   23 124/70   23 121/69   22 112/72       NPO Status:                                                                                 BMI:   Wt Readings from Last 3 Encounters:   23 209 lb 9.6 oz (95.1 kg)   23 195 lb (88.5 kg)   22 188 lb 9.6 oz (85.5 kg)     There is no height or weight on file to calculate BMI.    CBC:   Lab Results   Component Value Date/Time    WBC 5.4 2023 07:18 AM    RBC 4.23 2023 07:18 AM    HGB 12.8 2023 07:18 AM    HCT 40.7 2023 07:18 AM    MCV 96.2 2023 07:18 AM    RDW 13.1 2023 07:18 AM     2023 07:18 AM       CMP:   Lab Results   Component Value Date/Time     2023 07:18 AM    K 4.4 2023 07:18 AM     2023 07:18 AM    CO2 24 2023 07:18 AM    BUN 21 2023 07:18 AM    CREATININE 0.6 2023 07:18 AM    GFRAA >60 2021 05:17 AM    LABGLOM >60 2023 07:18 AM    GLUCOSE 99 2023 07:18 AM    PROT 7.0 2023 07:18 AM    PROT 7.4 08/10/2012 10:45 PM    CALCIUM 9.7 2023 07:18 AM    BILITOT 0.4 01/12/2023 07:18 AM    ALKPHOS 87 01/12/2023 07:18 AM    AST 15 01/12/2023 07:18 AM    ALT 10 01/12/2023 07:18 AM       POC Tests: No results for input(s): POCGLU, POCNA, POCK, POCCL, POCBUN, POCHEMO, POCHCT in the last 72 hours. Coags:   Lab Results   Component Value Date/Time    PROTIME 16.0 01/12/2023 07:18 AM    PROTIME 40.7 08/03/2015 10:40 AM    INR 1.24 01/12/2023 07:18 AM    APTT 44.6 01/12/2023 07:18 AM       HCG (If Applicable): No results found for: PREGTESTUR, PREGSERUM, HCG, HCGQUANT     ABGs: No results found for: PHART, PO2ART, VWU7GHD, MEW1TBA, BEART, N0ZNQRKM     Type & Screen (If Applicable):  No results found for: LABABO, LABRH    Drug/Infectious Status (If Applicable):  No results found for: HIV, HEPCAB    COVID-19 Screening (If Applicable):   Lab Results   Component Value Date/Time    COVID19 NOT DETECTED 12/17/2021 09:35 AM           Anesthesia Evaluation  Patient summary reviewed  Airway: Mallampati: III          Dental:    (+) edentulous      Pulmonary:   (+) pneumonia:  decreased breath sounds                            Cardiovascular:    (+) hypertension:, valvular problems/murmurs: AS,         Rhythm: regular    Echocardiogram reviewed               ROS comment: Moderate to severe AS, EF 55-60, severe lvh     Neuro/Psych:                ROS comment: walker GI/Hepatic/Renal:             Endo/Other:    (+) : arthritis: OA., .                 Abdominal:             Vascular:   + DVT, PE. Other Findings:           Anesthesia Plan      general     ASA 3       Induction: intravenous. arterial line  MIPS: Postoperative opioids intended. Anesthetic plan and risks discussed with patient. Plan discussed with CRNA.     Attending anesthesiologist reviewed and agrees with Preprocedure content                ISHA Barron - CRNA   1/31/2023

## 2023-02-01 ENCOUNTER — HOSPITAL ENCOUNTER (INPATIENT)
Dept: CARDIAC CATH/INVASIVE PROCEDURES | Age: 83
LOS: 1 days | Discharge: HOME | DRG: 267 | End: 2023-02-02
Attending: INTERNAL MEDICINE | Admitting: INTERNAL MEDICINE
Payer: COMMERCIAL

## 2023-02-01 ENCOUNTER — TELEPHONE (OUTPATIENT)
Dept: CARDIOLOGY CLINIC | Age: 83
End: 2023-02-01

## 2023-02-01 ENCOUNTER — APPOINTMENT (OUTPATIENT)
Dept: GENERAL RADIOLOGY | Age: 83
DRG: 267 | End: 2023-02-01
Attending: INTERNAL MEDICINE
Payer: COMMERCIAL

## 2023-02-01 ENCOUNTER — ANESTHESIA (OUTPATIENT)
Dept: CARDIAC CATH/INVASIVE PROCEDURES | Age: 83
DRG: 267 | End: 2023-02-01
Payer: COMMERCIAL

## 2023-02-01 LAB
ALBUMIN SERPL-MCNC: 4.1 GM/DL (ref 3.4–5)
ALP BLD-CCNC: 82 IU/L (ref 40–128)
ALT SERPL-CCNC: 10 U/L (ref 10–40)
ANION GAP SERPL CALCULATED.3IONS-SCNC: 9 MMOL/L (ref 4–16)
APTT: 33.1 SECONDS (ref 25.1–37.1)
AST SERPL-CCNC: 18 IU/L (ref 15–37)
BILIRUB SERPL-MCNC: 0.4 MG/DL (ref 0–1)
BUN SERPL-MCNC: 17 MG/DL (ref 6–23)
CALCIUM SERPL-MCNC: 9.7 MG/DL (ref 8.3–10.6)
CHLORIDE BLD-SCNC: 104 MMOL/L (ref 99–110)
CO2: 27 MMOL/L (ref 21–32)
CREAT SERPL-MCNC: 0.6 MG/DL (ref 0.6–1.1)
GFR SERPL CREATININE-BSD FRML MDRD: >60 ML/MIN/1.73M2
GLUCOSE SERPL-MCNC: 103 MG/DL (ref 70–99)
HCT VFR BLD CALC: 40.3 % (ref 37–47)
HEMOGLOBIN: 12.5 GM/DL (ref 12.5–16)
INR BLD: 0.87 INDEX
LV EF: 50 %
LVEF MODALITY: NORMAL
MCH RBC QN AUTO: 29.9 PG (ref 27–31)
MCHC RBC AUTO-ENTMCNC: 31 % (ref 32–36)
MCV RBC AUTO: 96.4 FL (ref 78–100)
PDW BLD-RTO: 12.8 % (ref 11.7–14.9)
PLATELET # BLD: 205 K/CU MM (ref 140–440)
PMV BLD AUTO: 9.7 FL (ref 7.5–11.1)
POTASSIUM SERPL-SCNC: 4.4 MMOL/L (ref 3.5–5.1)
PRO-BNP: 162.5 PG/ML
PROTHROMBIN TIME: 11.2 SECONDS (ref 11.7–14.5)
RBC # BLD: 4.18 M/CU MM (ref 4.2–5.4)
SODIUM BLD-SCNC: 140 MMOL/L (ref 135–145)
TOTAL PROTEIN: 7.1 GM/DL (ref 6.4–8.2)
WBC # BLD: 4.8 K/CU MM (ref 4–10.5)

## 2023-02-01 PROCEDURE — P9016 RBC LEUKOCYTES REDUCED: HCPCS

## 2023-02-01 PROCEDURE — 6360000002 HC RX W HCPCS: Performed by: INTERNAL MEDICINE

## 2023-02-01 PROCEDURE — 86901 BLOOD TYPING SEROLOGIC RH(D): CPT

## 2023-02-01 PROCEDURE — 6360000004 HC RX CONTRAST MEDICATION

## 2023-02-01 PROCEDURE — 33361 REPLACE AORTIC VALVE PERQ: CPT

## 2023-02-01 PROCEDURE — C1760 CLOSURE DEV, VASC: HCPCS

## 2023-02-01 PROCEDURE — 02RF38Z REPLACEMENT OF AORTIC VALVE WITH ZOOPLASTIC TISSUE, PERCUTANEOUS APPROACH: ICD-10-PCS | Performed by: INTERNAL MEDICINE

## 2023-02-01 PROCEDURE — 6360000002 HC RX W HCPCS

## 2023-02-01 PROCEDURE — 71045 X-RAY EXAM CHEST 1 VIEW: CPT

## 2023-02-01 PROCEDURE — 86900 BLOOD TYPING SEROLOGIC ABO: CPT

## 2023-02-01 PROCEDURE — C1889 IMPLANT/INSERT DEVICE, NOC: HCPCS

## 2023-02-01 PROCEDURE — P9045 ALBUMIN (HUMAN), 5%, 250 ML: HCPCS | Performed by: NURSE ANESTHETIST, CERTIFIED REGISTERED

## 2023-02-01 PROCEDURE — 85730 THROMBOPLASTIN TIME PARTIAL: CPT

## 2023-02-01 PROCEDURE — 6360000002 HC RX W HCPCS: Performed by: NURSE ANESTHETIST, CERTIFIED REGISTERED

## 2023-02-01 PROCEDURE — 2720000010 HC SURG SUPPLY STERILE

## 2023-02-01 PROCEDURE — 85027 COMPLETE CBC AUTOMATED: CPT

## 2023-02-01 PROCEDURE — 85610 PROTHROMBIN TIME: CPT

## 2023-02-01 PROCEDURE — 6370000000 HC RX 637 (ALT 250 FOR IP): Performed by: INTERNAL MEDICINE

## 2023-02-01 PROCEDURE — 3700000001 HC ADD 15 MINUTES (ANESTHESIA)

## 2023-02-01 PROCEDURE — 2580000003 HC RX 258: Performed by: NURSE ANESTHETIST, CERTIFIED REGISTERED

## 2023-02-01 PROCEDURE — 36620 INSERTION CATHETER ARTERY: CPT

## 2023-02-01 PROCEDURE — 33361 REPLACE AORTIC VALVE PERQ: CPT | Performed by: INTERNAL MEDICINE

## 2023-02-01 PROCEDURE — 86850 RBC ANTIBODY SCREEN: CPT

## 2023-02-01 PROCEDURE — 2709999900 HC NON-CHARGEABLE SUPPLY

## 2023-02-01 PROCEDURE — 80053 COMPREHEN METABOLIC PANEL: CPT

## 2023-02-01 PROCEDURE — C1894 INTRO/SHEATH, NON-LASER: HCPCS

## 2023-02-01 PROCEDURE — 86922 COMPATIBILITY TEST ANTIGLOB: CPT

## 2023-02-01 PROCEDURE — 93308 TTE F-UP OR LMTD: CPT

## 2023-02-01 PROCEDURE — 93005 ELECTROCARDIOGRAM TRACING: CPT | Performed by: INTERNAL MEDICINE

## 2023-02-01 PROCEDURE — 2580000003 HC RX 258

## 2023-02-01 PROCEDURE — 4A023N7 MEASUREMENT OF CARDIAC SAMPLING AND PRESSURE, LEFT HEART, PERCUTANEOUS APPROACH: ICD-10-PCS | Performed by: INTERNAL MEDICINE

## 2023-02-01 PROCEDURE — P9045 ALBUMIN (HUMAN), 5%, 250 ML: HCPCS

## 2023-02-01 PROCEDURE — 83880 ASSAY OF NATRIURETIC PEPTIDE: CPT

## 2023-02-01 PROCEDURE — 2580000003 HC RX 258: Performed by: INTERNAL MEDICINE

## 2023-02-01 PROCEDURE — 3700000000 HC ANESTHESIA ATTENDED CARE

## 2023-02-01 PROCEDURE — C1769 GUIDE WIRE: HCPCS

## 2023-02-01 PROCEDURE — 2500000003 HC RX 250 WO HCPCS

## 2023-02-01 RX ORDER — FUROSEMIDE 20 MG/1
20 TABLET ORAL 2 TIMES DAILY
Status: DISCONTINUED | OUTPATIENT
Start: 2023-02-01 | End: 2023-02-02 | Stop reason: HOSPADM

## 2023-02-01 RX ORDER — SODIUM CHLORIDE 9 MG/ML
INJECTION, SOLUTION INTRAVENOUS CONTINUOUS PRN
Status: DISCONTINUED | OUTPATIENT
Start: 2023-02-01 | End: 2023-02-01 | Stop reason: SDUPTHER

## 2023-02-01 RX ORDER — HYDROCODONE BITARTRATE AND ACETAMINOPHEN 5; 325 MG/1; MG/1
1 TABLET ORAL EVERY 4 HOURS PRN
Status: DISCONTINUED | OUTPATIENT
Start: 2023-02-01 | End: 2023-02-02 | Stop reason: HOSPADM

## 2023-02-01 RX ORDER — ASPIRIN 81 MG/1
81 TABLET ORAL DAILY
Status: DISCONTINUED | OUTPATIENT
Start: 2023-02-01 | End: 2023-02-02 | Stop reason: HOSPADM

## 2023-02-01 RX ORDER — HYDRALAZINE HYDROCHLORIDE 20 MG/ML
INJECTION INTRAMUSCULAR; INTRAVENOUS PRN
Status: DISCONTINUED | OUTPATIENT
Start: 2023-02-01 | End: 2023-02-01 | Stop reason: SDUPTHER

## 2023-02-01 RX ORDER — HEPARIN SODIUM 1000 [USP'U]/ML
INJECTION, SOLUTION INTRAVENOUS; SUBCUTANEOUS PRN
Status: DISCONTINUED | OUTPATIENT
Start: 2023-02-01 | End: 2023-02-01 | Stop reason: SDUPTHER

## 2023-02-01 RX ORDER — ASPIRIN 81 MG/1
81 TABLET, CHEWABLE ORAL DAILY
Status: DISCONTINUED | OUTPATIENT
Start: 2023-02-02 | End: 2023-02-02 | Stop reason: HOSPADM

## 2023-02-01 RX ORDER — SODIUM CHLORIDE 9 MG/ML
INJECTION, SOLUTION INTRAVENOUS PRN
Status: DISCONTINUED | OUTPATIENT
Start: 2023-02-01 | End: 2023-02-02 | Stop reason: HOSPADM

## 2023-02-01 RX ORDER — ACETAMINOPHEN 325 MG/1
650 TABLET ORAL EVERY 4 HOURS PRN
Status: DISCONTINUED | OUTPATIENT
Start: 2023-02-01 | End: 2023-02-02 | Stop reason: HOSPADM

## 2023-02-01 RX ORDER — FENTANYL CITRATE 50 UG/ML
INJECTION, SOLUTION INTRAMUSCULAR; INTRAVENOUS PRN
Status: DISCONTINUED | OUTPATIENT
Start: 2023-02-01 | End: 2023-02-01 | Stop reason: SDUPTHER

## 2023-02-01 RX ORDER — ASPIRIN 81 MG/1
81 TABLET ORAL DAILY
Status: DISCONTINUED | OUTPATIENT
Start: 2023-02-02 | End: 2023-02-01

## 2023-02-01 RX ORDER — ONDANSETRON 2 MG/ML
4 INJECTION INTRAMUSCULAR; INTRAVENOUS EVERY 6 HOURS PRN
Status: DISCONTINUED | OUTPATIENT
Start: 2023-02-01 | End: 2023-02-02 | Stop reason: HOSPADM

## 2023-02-01 RX ORDER — ONDANSETRON 4 MG/1
4 TABLET, ORALLY DISINTEGRATING ORAL EVERY 8 HOURS PRN
Status: DISCONTINUED | OUTPATIENT
Start: 2023-02-01 | End: 2023-02-02 | Stop reason: HOSPADM

## 2023-02-01 RX ORDER — SODIUM CHLORIDE 0.9 % (FLUSH) 0.9 %
5-40 SYRINGE (ML) INJECTION PRN
Status: DISCONTINUED | OUTPATIENT
Start: 2023-02-01 | End: 2023-02-02 | Stop reason: HOSPADM

## 2023-02-01 RX ORDER — SODIUM CHLORIDE 0.9 % (FLUSH) 0.9 %
5-40 SYRINGE (ML) INJECTION EVERY 12 HOURS SCHEDULED
Status: DISCONTINUED | OUTPATIENT
Start: 2023-02-01 | End: 2023-02-02 | Stop reason: HOSPADM

## 2023-02-01 RX ORDER — PROPOFOL 10 MG/ML
INJECTION, EMULSION INTRAVENOUS CONTINUOUS PRN
Status: DISCONTINUED | OUTPATIENT
Start: 2023-02-01 | End: 2023-02-01 | Stop reason: SDUPTHER

## 2023-02-01 RX ORDER — ONDANSETRON 2 MG/ML
INJECTION INTRAMUSCULAR; INTRAVENOUS PRN
Status: DISCONTINUED | OUTPATIENT
Start: 2023-02-01 | End: 2023-02-01 | Stop reason: SDUPTHER

## 2023-02-01 RX ORDER — POTASSIUM CHLORIDE 1.5 G/1.77G
10 POWDER, FOR SOLUTION ORAL DAILY
Status: DISCONTINUED | OUTPATIENT
Start: 2023-02-01 | End: 2023-02-02

## 2023-02-01 RX ORDER — ALBUTEROL SULFATE 90 UG/1
2 AEROSOL, METERED RESPIRATORY (INHALATION) EVERY 6 HOURS PRN
Status: DISCONTINUED | OUTPATIENT
Start: 2023-02-01 | End: 2023-02-02 | Stop reason: HOSPADM

## 2023-02-01 RX ORDER — LANOLIN ALCOHOL/MO/W.PET/CERES
200 CREAM (GRAM) TOPICAL NIGHTLY
Status: DISCONTINUED | OUTPATIENT
Start: 2023-02-01 | End: 2023-02-02 | Stop reason: HOSPADM

## 2023-02-01 RX ORDER — LIDOCAINE HYDROCHLORIDE 20 MG/ML
INJECTION, SOLUTION INTRAVENOUS PRN
Status: DISCONTINUED | OUTPATIENT
Start: 2023-02-01 | End: 2023-02-01 | Stop reason: SDUPTHER

## 2023-02-01 RX ORDER — ALBUMIN, HUMAN INJ 5% 5 %
SOLUTION INTRAVENOUS PRN
Status: DISCONTINUED | OUTPATIENT
Start: 2023-02-01 | End: 2023-02-01 | Stop reason: SDUPTHER

## 2023-02-01 RX ORDER — CEFAZOLIN SODIUM 2 G/50ML
SOLUTION INTRAVENOUS PRN
Status: DISCONTINUED | OUTPATIENT
Start: 2023-02-01 | End: 2023-02-01 | Stop reason: SDUPTHER

## 2023-02-01 RX ORDER — TRAZODONE HYDROCHLORIDE 50 MG/1
50 TABLET ORAL NIGHTLY
Status: DISCONTINUED | OUTPATIENT
Start: 2023-02-01 | End: 2023-02-02 | Stop reason: HOSPADM

## 2023-02-01 RX ORDER — BUPIVACAINE HYDROCHLORIDE 2.5 MG/ML
30 INJECTION, SOLUTION EPIDURAL; INFILTRATION; INTRACAUDAL ONCE
Status: DISCONTINUED | OUTPATIENT
Start: 2023-02-01 | End: 2023-02-02 | Stop reason: HOSPADM

## 2023-02-01 RX ORDER — 0.9 % SODIUM CHLORIDE 0.9 %
1000 INTRAVENOUS SOLUTION INTRAVENOUS ONCE
Status: COMPLETED | OUTPATIENT
Start: 2023-02-01 | End: 2023-02-01

## 2023-02-01 RX ORDER — MIDAZOLAM HYDROCHLORIDE 1 MG/ML
INJECTION INTRAMUSCULAR; INTRAVENOUS PRN
Status: DISCONTINUED | OUTPATIENT
Start: 2023-02-01 | End: 2023-02-01 | Stop reason: SDUPTHER

## 2023-02-01 RX ORDER — PROTAMINE SULFATE 10 MG/ML
INJECTION, SOLUTION INTRAVENOUS PRN
Status: DISCONTINUED | OUTPATIENT
Start: 2023-02-01 | End: 2023-02-01 | Stop reason: SDUPTHER

## 2023-02-01 RX ADMIN — HEPARIN SODIUM 7000 UNITS: 1000 INJECTION, SOLUTION INTRAVENOUS; SUBCUTANEOUS at 08:37

## 2023-02-01 RX ADMIN — FUROSEMIDE 20 MG: 20 TABLET ORAL at 21:53

## 2023-02-01 RX ADMIN — MAGNESIUM OXIDE TAB 400 MG (241.3 MG ELEMENTAL MG) 200 MG: 400 (241.3 MG) TAB at 21:54

## 2023-02-01 RX ADMIN — HYDROCODONE BITARTRATE AND ACETAMINOPHEN 1 TABLET: 5; 325 TABLET ORAL at 18:43

## 2023-02-01 RX ADMIN — MIDAZOLAM 2 MG: 1 INJECTION INTRAMUSCULAR; INTRAVENOUS at 07:24

## 2023-02-01 RX ADMIN — CEFAZOLIN SODIUM 2000 MG: 2 SOLUTION INTRAVENOUS at 07:45

## 2023-02-01 RX ADMIN — TRAZODONE HYDROCHLORIDE 50 MG: 50 TABLET ORAL at 21:53

## 2023-02-01 RX ADMIN — SODIUM CHLORIDE 1000 ML: 9 INJECTION, SOLUTION INTRAVENOUS at 14:42

## 2023-02-01 RX ADMIN — LIDOCAINE HYDROCHLORIDE 110 MG: 20 INJECTION, SOLUTION INTRAVENOUS at 07:42

## 2023-02-01 RX ADMIN — SODIUM CHLORIDE, PRESERVATIVE FREE 10 ML: 5 INJECTION INTRAVENOUS at 11:28

## 2023-02-01 RX ADMIN — FENTANYL CITRATE 50 MCG: 50 INJECTION, SOLUTION INTRAMUSCULAR; INTRAVENOUS at 07:55

## 2023-02-01 RX ADMIN — HYDROCODONE BITARTRATE AND ACETAMINOPHEN 1 TABLET: 5; 325 TABLET ORAL at 23:14

## 2023-02-01 RX ADMIN — PROTAMINE SULFATE 30 MG: 10 INJECTION, SOLUTION INTRAVENOUS at 09:06

## 2023-02-01 RX ADMIN — SODIUM CHLORIDE, PRESERVATIVE FREE 10 ML: 5 INJECTION INTRAVENOUS at 21:30

## 2023-02-01 RX ADMIN — HYDRALAZINE HYDROCHLORIDE 5 MG: 20 INJECTION, SOLUTION INTRAMUSCULAR; INTRAVENOUS at 09:25

## 2023-02-01 RX ADMIN — ONDANSETRON 4 MG: 2 INJECTION INTRAMUSCULAR; INTRAVENOUS at 13:56

## 2023-02-01 RX ADMIN — ASPIRIN 81 MG: 81 TABLET, COATED ORAL at 11:30

## 2023-02-01 RX ADMIN — PROPOFOL 100 MCG/KG/MIN: 10 INJECTION, EMULSION INTRAVENOUS at 07:41

## 2023-02-01 RX ADMIN — PHENYLEPHRINE HYDROCHLORIDE 20 MCG/MIN: 10 INJECTION INTRAVENOUS at 07:55

## 2023-02-01 RX ADMIN — FENTANYL CITRATE 100 MCG: 50 INJECTION, SOLUTION INTRAMUSCULAR; INTRAVENOUS at 07:35

## 2023-02-01 RX ADMIN — HYDROCODONE BITARTRATE AND ACETAMINOPHEN 1 TABLET: 5; 325 TABLET ORAL at 11:31

## 2023-02-01 RX ADMIN — ONDANSETRON 4 MG: 2 INJECTION INTRAMUSCULAR; INTRAVENOUS at 09:28

## 2023-02-01 RX ADMIN — FENTANYL CITRATE 50 MCG: 50 INJECTION, SOLUTION INTRAMUSCULAR; INTRAVENOUS at 08:25

## 2023-02-01 RX ADMIN — ALBUMIN (HUMAN) 250 ML: 12.5 INJECTION, SOLUTION INTRAVENOUS at 07:58

## 2023-02-01 RX ADMIN — FUROSEMIDE 20 MG: 20 TABLET ORAL at 11:31

## 2023-02-01 RX ADMIN — SODIUM CHLORIDE: 900 INJECTION INTRAVENOUS at 07:18

## 2023-02-01 ASSESSMENT — PAIN DESCRIPTION - LOCATION
LOCATION: HEAD;GROIN
LOCATION: GROIN
LOCATION: HEAD;GROIN

## 2023-02-01 ASSESSMENT — PAIN DESCRIPTION - FREQUENCY
FREQUENCY: CONTINUOUS

## 2023-02-01 ASSESSMENT — PAIN SCALES - GENERAL
PAINLEVEL_OUTOF10: 2
PAINLEVEL_OUTOF10: 5
PAINLEVEL_OUTOF10: 0
PAINLEVEL_OUTOF10: 2
PAINLEVEL_OUTOF10: 5
PAINLEVEL_OUTOF10: 2
PAINLEVEL_OUTOF10: 0
PAINLEVEL_OUTOF10: 6
PAINLEVEL_OUTOF10: 4
PAINLEVEL_OUTOF10: 3
PAINLEVEL_OUTOF10: 8
PAINLEVEL_OUTOF10: 6
PAINLEVEL_OUTOF10: 3
PAINLEVEL_OUTOF10: 3
PAINLEVEL_OUTOF10: 4
PAINLEVEL_OUTOF10: 8
PAINLEVEL_OUTOF10: 7
PAINLEVEL_OUTOF10: 7

## 2023-02-01 ASSESSMENT — PAIN DESCRIPTION - ORIENTATION
ORIENTATION: RIGHT;LEFT;INNER
ORIENTATION: RIGHT;LEFT;INNER
ORIENTATION: RIGHT;LEFT
ORIENTATION: RIGHT;LEFT;INNER

## 2023-02-01 ASSESSMENT — PAIN DESCRIPTION - ONSET
ONSET: ON-GOING

## 2023-02-01 ASSESSMENT — PAIN - FUNCTIONAL ASSESSMENT
PAIN_FUNCTIONAL_ASSESSMENT: PREVENTS OR INTERFERES SOME ACTIVE ACTIVITIES AND ADLS

## 2023-02-01 ASSESSMENT — PAIN DESCRIPTION - DESCRIPTORS
DESCRIPTORS: SORE
DESCRIPTORS: SORE
DESCRIPTORS: ACHING
DESCRIPTORS: SORE
DESCRIPTORS: THROBBING
DESCRIPTORS: SORE
DESCRIPTORS: SORE

## 2023-02-01 ASSESSMENT — PAIN DESCRIPTION - PAIN TYPE
TYPE: SURGICAL PAIN
TYPE: SURGICAL PAIN;ACUTE PAIN
TYPE: SURGICAL PAIN
TYPE: SURGICAL PAIN

## 2023-02-01 NOTE — PLAN OF CARE
Unable to obtain second IV site. Awaiting patient to be under anesthesia for second line to be added. Patients veins are very small and a difficult stick. Family at bedside.  [facility], [address] [place] [address] 2/1/2023

## 2023-02-01 NOTE — PROCEDURES
Procedure : Cutaneous femoral access  aortic valve replacement ( TAVR)                      Using Medtronic evolute  34 MM SR F [id number]    Indication : Severe Aortic stenosis with heart failure symptoms and class III angina    Operators : Dr Deana Hoyos / Dr Mai Dhaliwal /Dr Radha Bhatt                          Procedure description : Patient was brought to the operating Cath Lab hybrid room. Anesthesia performed anesthesia using moderate sedation   Thoracic echo was used to identify the aortic valve and findings noted. The patient was appropriately draped and prepped. Left femoral arterial access was obtained using ultrasound guidance using Seldinger technique. 6 Cuban sheath was placed in the left femoral artery venous access was obtained in the left femoral vein using ultrasound guidance. We then advanced a 6 Western Jessica temporary pacer wire into the right ventricle pacing was confirmed. We then obtained arterial access using Seldinger technique under ultrasound guidance of the right femoral arteries and a 6 Cuban sheath was placed in right femoral artery . Right femoral site was prepared Perclose were placed. Right femoral 6 Cuban sheath was removed. After serial dilatation sheath size was increased to 14 Western Jessica. A 6 Cuban pigtail was then advanced and positioned into the right noncoronary cusp for serial aortograms. AL-1 diagnostic catheter was used to advance a straight wire and aortic valve was crossed. We then exchanged for an exchange length J-wire and a pigtail was placed into the left ventricle simultaneous pressures were recorded gradient confirmed. Core valve was checked under fluoroscopy. At this point a second timeout was completed and patient was given 7000 units of heparin . We then used to confide a wire which was positioned into the left ventricle. Core valve delivery system was then advanced over the confida wire. Aortic valve was crossed in an Albanian projection multiple angles were taken. Using fluoroscopy guidance position was confirmed using serial fluoroscopic angiograms    Using fluoroscopy guidance prosthesis was deployed at 80% deployment aortogram was repeated. After confirming the prosthesis position Medtronic device was implanted. Transthoracic echo and aortogram was used to confirm no significant paravalvular leak. EBL of  30 cc    Simultaneous pressures were again recorded.   Right groin site was closed with percutaneous peripheral angiogram was confirmed significant no significant stenosis  Left femoral sheath was removed Angio-Seal /pressure applied patient was given protamine for heparin reversal  Pt was transferred out to ICU

## 2023-02-01 NOTE — ANESTHESIA POSTPROCEDURE EVALUATION
Department of Anesthesiology  Postprocedure Note    Patient: Sheri Salazar  MRN: [id number]  YOB: 1940  Date of evaluation: 2/1/2023      Procedure Summary     Date: 02/01/23 Room / Location: [facility] Cath Lab    Anesthesia Start: 0718 Anesthesia Stop: 0957    Procedure: TAVR W/ ANESTHESIA Diagnosis: Nonrheumatic aortic (valve) stenosis    Scheduled Providers:  Responsible Provider: Toro Rice MD    Anesthesia Type: General ASA Status: 3          Anesthesia Type: General    Tracy Phase I: Tracy Score: 10    Tracy Phase II:        Anesthesia Post Evaluation    Patient location during evaluation: ICU  Patient participation: complete - patient participated  Level of consciousness: sleepy but conscious  Pain score: 1  Airway patency: patent  Nausea & Vomiting: no nausea and no vomiting  Complications: no  Cardiovascular status: blood pressure returned to baseline and hemodynamically stable  Respiratory status: acceptable  Hydration status: stable  Multimodal analgesia pain management approach

## 2023-02-01 NOTE — PROGRESS NOTES
Dr. Ventura Mention aware of soft /37 (53). Orders for 1L saline bolus. No new orders at this time. Groin sites remain clean, dry, and without signs of hematoma/bleeding.

## 2023-02-01 NOTE — PROGRESS NOTES
Pt. arrived to unit at 0948. She is a/o x4 with stable vital signs. Axillary temp 35.6; warming blanket applied at this time. Pt. has c/o pain with a rating of 8 out of 10; awaiting orders from MD. Soft bilateral groin sites; no bruising, oozing, or hematoma. Pt. instructed to remain flat and avoid moving BLE. Bed in lowest position, alarm engaged, and call light within reach. All concerns addressed at this time.

## 2023-02-01 NOTE — PROGRESS NOTES
4 Eyes Skin Assessment     NAME:  Negro Moody  YOB: 1940  MEDICAL RECORD NUMBER:  [id number]    The patient is being assessed for  Admission; Cath Lab post-op    I agree that One RN have performed a thorough Head to Toe Skin Assessment on the patient. ALL assessment sites listed below have been assessed. Areas assessed by both nurses:    Head, Face, Ears, Shoulders, Back, Chest, Arms, Elbows, Hands, Sacrum. Buttock, Coccyx, Ischium, and Legs. Feet and Heels        Does the Patient have a Wound?  No noted wound(s)       Maximo Prevention initiated by RN: Yes   Wound Care Orders initiated by RN: No    Pressure Injury (Stage 3,4, Unstageable, DTI, NWPT, and Complex wounds) if present place referral order by RN under : No    New and Established Ostomies, if present place, referral order under : NA      Nurse 1 eSignature: Electronically signed by Marion Noguera RN on 2/1/23 at 11:17 AM EST    **SHARE this note so that the co-signing nurse is able to place an eSignature**    Nurse 2 eSignature: Electronically signed by Bobby Krishnan RN on 2/1/23 at 1:43 PM EST

## 2023-02-01 NOTE — H&P
Ashu Lund, 81 y.o., female    Primary care physician:  Dipesh Malhotra     Chief Complaint: Aortic stenosis     History of Present Illness:  He 4year-old with previous history of severe aortic stenosis and subdural hematoma walks with a walker comes in for evaluation and TAVR due to worsening shortness of breath on exertion congestive heart failure  Work-up revealed severe aortic stenosis with a valve area of 0.6 cm² and a mean gradient of 41 mmHg normally sees Dr. Giuliana Leary    Past medical history:    has a past medical history of Arthritis, DVT of deep femoral vein ([address] H/O echocardiogram Limited, H/O right and left heart catheterization, History of echocardiogram, Hx of Doppler echocardiogram, Hypertension, PAD (peripheral artery disease) ([address] Pneumonia, Seizures ([address] and Vertigo. Past surgical history:   has a past surgical history that includes  section; Endoscopy, colon, diagnostic; and Colonoscopy. Social History:   reports that she quit smoking about 6 months ago. Her smoking use included cigarettes. She has a 12.50 pack-year smoking history. She has never used smokeless tobacco. She reports that she does not currently use alcohol after a past usage of about 2.0 standard drinks per week. She reports that she does not use drugs. Family history:  family history includes Cancer in her mother. Allergies: Allergies   Allergen Reactions    Coumadin [Warfarin Sodium] Hives     hives    Ambien [Zolpidem Tartrate] Other (See Comments)     hallucinations    Morphine Sulfate Other (See Comments)     agitation       Home Medications:  Prior to Admission medications    Medication Sig Start Date End Date Taking?  Authorizing Provider   rivaroxaban (XARELTO) 20 MG TABS tablet Take 1 tablet by mouth Daily with supper 22   Adele Dominique MD   HYDROcodone-acetaminophen [facility]) 5-325 MG per tablet  10/10/22   Historical Provider, MD   traZODone (DESYREL) 50 MG tablet Take 1 tablet by mouth nightly 7/14/21   Nghia Black MD   ondansetron (ZOFRAN-ODT) 4 MG disintegrating tablet Take 1 tablet by mouth every 8 hours as needed for Nausea or Vomiting 7/14/21   Nghia Black MD   metoprolol tartrate (LOPRESSOR) 25 MG tablet Take 1 tablet by mouth 2 times daily  Patient taking differently: Take 50 mg by mouth 2 times daily 7/14/21   Nghia Black MD   albuterol sulfate  (90 Base) MCG/ACT inhaler Inhale 2 puffs into the lungs every 6 hours as needed for Wheezing    Historical Provider, MD   potassium chloride (KLOR-CON) 20 MEQ packet Take 10 mEq by mouth daily     Historical Provider, MD   magnesium 30 MG tablet Take 200 mg by mouth nightly     Historical Provider, MD   furosemide (LASIX) 20 MG tablet Take 20 mg by mouth 2 times daily. Historical Provider, MD   aspirin 81 MG tablet Take 81 mg by mouth daily.     Historical Provider, MD       Review of systems: Review of Systems:   Constitutional: No Fever or Weight Loss   Eyes: No Decreased Vision  ENT: No Headaches, Hearing Loss or Vertigo  Cardiovascular: No chest pain, dyspnea on exertion, palpitations or loss of consciousness  Respiratory: No cough or wheezing    Gastrointestinal: No abdominal pain, appetite loss, blood in stools, constipation, diarrhea or heartburn  Genitourinary: No dysuria, trouble voiding, or hematuria  Musculoskeletal:  No gait disturbance, weakness or joint complaints  Integumentary: No rash or pruritis  Neurological: No TIA or stroke symptoms  Psychiatric: No anxiety or depression  Endocrine: No malaise, fatigue or temperature intolerance  Hematologic/Lymphatic: No bleeding problems, blood clots or swollen lymph nodes  Allergic/Immunologic: No nasal congestion or hives    Physical Examination:    /75   Pulse 85   Temp 97.1 °F (36.2 °C) (Temporal)   Resp 14   Ht 5' 11\" (1.803 m)   Wt 209 lb (94.8 kg)   SpO2 98%   BMI 29.15 kg/m²      General Appearance:  No distress, conversant  Constitutional:  Well developed, Well nourished, No acute distress, Non-toxic appearance. HENT:  Normocephalic, Atraumatic, Bilateral external ears normal, Oropharynx moist, No oral exudates, Nose normal. Neck- Normal range of motion, No tenderness, Supple, No stridor,no apical-carotid delay  Eyes:  PERRL, EOMI, Conjunctiva normal, No discharge. Lymphatics: no palpable lymph nodes  Respiratory:  Normal breath sounds, No respiratory distress, No wheezing, No chest tenderness. ,no uise of accessory muscles, JVP not elevated  Cardiovascular: (PMI) apex non displaced,no lifts no thrills, no s3,no s4, Normal heart rate, Normal rhythm, No murmurs, No rubs, No gallops. GI:  Bowel sounds normal, Soft, No tenderness, No masses, No pulsatile masses, no hepatosplenomegally, no bruits  Musculoskeletal:  Intact distal pulses, No edema, No tenderness, No cyanosis, No clubbing. Good range of motion in all major joints. No tenderness to palpation or major deformities noted. Back- No tenderness. Integument:  Warm, Dry, No erythema, No rash. Skin: no rash, no ulcers  Lymphatic:  No lymphadenopathy noted. Neurologic:  Alert & oriented x 3, Normal motor function, Normal sensory function, No focal deficits noted. Lab Review   Recent Labs     02/01/23  0600   WBC 4.8   HGB 12.5   HCT 40.3         Recent Labs     02/01/23  0600      K 4.4      CO2 27   BUN 17   CREATININE 0.6     Recent Labs     02/01/23  0600   AST 18   ALT 10   BILITOT 0.4   ALKPHOS 82     No results for input(s): TROPONINI in the last 72 hours. Lab Results   Component Value Date    INR 0.87 02/01/2023    PROTIME 11.2 (L) 02/01/2023     Lab Results   Component Value Date    BNP 45 01/18/2014    BNP 4 04/12/2011         Assessment:    Active Problems:    * No active hospital problems. *      Plan:   1. : Alternate and risks versus benefits were discussed in detail. We will plan to have her.   We  discussed the risk of but not limited to pacemaker postprocedure potential kidney failure, emergent surgery,blood transfusion  transfusion, infection, potential even death.   Patient is in agreement and wants to proceed

## 2023-02-01 NOTE — ANESTHESIA PROCEDURE NOTES
Arterial Line:    An arterial line was placed in the OR for the following indication(s): continuous blood pressure monitoring and blood sampling needed. A 22 gauge (size), 1 and 3/4 inch (length), Arrow (type) catheter was placed, Seldinger technique used, into the right radial artery, secured by Tegaderm. Anesthesia type: Local  Local infiltration: Injection    Events:  patient tolerated procedure well with no complications and EBL < 5mL. 2/1/2023 7:30 AM2/1/2023 7:33 AM  Anesthesiologist: Bronwyn Morel MD  Resident/CRNA: ISHA Garcia CRNA  Performed: Resident/CRNA   Preanesthetic Checklist  Completed: patient identified, IV checked, site marked, risks and benefits discussed, surgical/procedural consents, equipment checked, pre-op evaluation, timeout performed, anesthesia consent given, oxygen available, monitors applied/VS acknowledged, fire risk safety assessment completed and verbalized and blood product R/B/A discussed and consented
If you are a smoker, it is important for your health to stop smoking. Please be aware that second hand smoke is also harmful.

## 2023-02-02 VITALS
WEIGHT: 209 LBS | OXYGEN SATURATION: 92 % | RESPIRATION RATE: 20 BRPM | HEIGHT: 71 IN | TEMPERATURE: 98.2 F | DIASTOLIC BLOOD PRESSURE: 68 MMHG | SYSTOLIC BLOOD PRESSURE: 131 MMHG | BODY MASS INDEX: 29.26 KG/M2 | HEART RATE: 87 BPM

## 2023-02-02 PROBLEM — I35.0 AORTIC STENOSIS, SEVERE: Status: ACTIVE | Noted: 2023-02-02

## 2023-02-02 LAB
ANION GAP SERPL CALCULATED.3IONS-SCNC: 8 MMOL/L (ref 4–16)
BASOPHILS ABSOLUTE: 0 K/CU MM
BASOPHILS RELATIVE PERCENT: 0.3 % (ref 0–1)
BUN SERPL-MCNC: 12 MG/DL (ref 6–23)
CALCIUM SERPL-MCNC: 9.1 MG/DL (ref 8.3–10.6)
CHLORIDE BLD-SCNC: 99 MMOL/L (ref 99–110)
CO2: 27 MMOL/L (ref 21–32)
CREAT SERPL-MCNC: 0.5 MG/DL (ref 0.6–1.1)
DIFFERENTIAL TYPE: ABNORMAL
EKG ATRIAL RATE: 89 BPM
EKG ATRIAL RATE: 98 BPM
EKG DIAGNOSIS: NORMAL
EKG DIAGNOSIS: NORMAL
EKG P AXIS: 58 DEGREES
EKG P AXIS: 58 DEGREES
EKG P-R INTERVAL: 174 MS
EKG P-R INTERVAL: 188 MS
EKG Q-T INTERVAL: 362 MS
EKG Q-T INTERVAL: 388 MS
EKG QRS DURATION: 100 MS
EKG QRS DURATION: 92 MS
EKG QTC CALCULATION (BAZETT): 462 MS
EKG QTC CALCULATION (BAZETT): 472 MS
EKG R AXIS: 35 DEGREES
EKG R AXIS: 60 DEGREES
EKG T AXIS: 1 DEGREES
EKG T AXIS: 44 DEGREES
EKG VENTRICULAR RATE: 89 BPM
EKG VENTRICULAR RATE: 98 BPM
EOSINOPHILS ABSOLUTE: 0.1 K/CU MM
EOSINOPHILS RELATIVE PERCENT: 1.6 % (ref 0–3)
GFR SERPL CREATININE-BSD FRML MDRD: >60 ML/MIN/1.73M2
GLUCOSE SERPL-MCNC: 104 MG/DL (ref 70–99)
HCT VFR BLD CALC: 34.9 % (ref 37–47)
HEMOGLOBIN: 11 GM/DL (ref 12.5–16)
IMMATURE NEUTROPHIL %: 0.1 % (ref 0–0.43)
LYMPHOCYTES ABSOLUTE: 1.2 K/CU MM
LYMPHOCYTES RELATIVE PERCENT: 15.7 % (ref 24–44)
MCH RBC QN AUTO: 29.9 PG (ref 27–31)
MCHC RBC AUTO-ENTMCNC: 31.5 % (ref 32–36)
MCV RBC AUTO: 94.8 FL (ref 78–100)
MONOCYTES ABSOLUTE: 1 K/CU MM
MONOCYTES RELATIVE PERCENT: 13 % (ref 0–4)
NUCLEATED RBC %: 0 %
PDW BLD-RTO: 12.7 % (ref 11.7–14.9)
PLATELET # BLD: 145 K/CU MM (ref 140–440)
PMV BLD AUTO: 9.9 FL (ref 7.5–11.1)
POTASSIUM SERPL-SCNC: 4 MMOL/L (ref 3.5–5.1)
RBC # BLD: 3.68 M/CU MM (ref 4.2–5.4)
SEGMENTED NEUTROPHILS ABSOLUTE COUNT: 5.4 K/CU MM
SEGMENTED NEUTROPHILS RELATIVE PERCENT: 69.3 % (ref 36–66)
SODIUM BLD-SCNC: 134 MMOL/L (ref 135–145)
TOTAL IMMATURE NEUTOROPHIL: 0.01 K/CU MM
TOTAL NUCLEATED RBC: 0 K/CU MM
WBC # BLD: 7.7 K/CU MM (ref 4–10.5)

## 2023-02-02 PROCEDURE — 2580000003 HC RX 258: Performed by: INTERNAL MEDICINE

## 2023-02-02 PROCEDURE — 85025 COMPLETE CBC W/AUTO DIFF WBC: CPT

## 2023-02-02 PROCEDURE — 80048 BASIC METABOLIC PNL TOTAL CA: CPT

## 2023-02-02 PROCEDURE — 93306 TTE W/DOPPLER COMPLETE: CPT

## 2023-02-02 PROCEDURE — 6370000000 HC RX 637 (ALT 250 FOR IP): Performed by: INTERNAL MEDICINE

## 2023-02-02 PROCEDURE — 2000000000 HC ICU R&B

## 2023-02-02 PROCEDURE — 93010 ELECTROCARDIOGRAM REPORT: CPT | Performed by: INTERNAL MEDICINE

## 2023-02-02 PROCEDURE — 93005 ELECTROCARDIOGRAM TRACING: CPT | Performed by: INTERNAL MEDICINE

## 2023-02-02 RX ORDER — POTASSIUM CHLORIDE 750 MG/1
10 TABLET, FILM COATED, EXTENDED RELEASE ORAL
Status: DISCONTINUED | OUTPATIENT
Start: 2023-02-02 | End: 2023-02-02 | Stop reason: HOSPADM

## 2023-02-02 RX ORDER — METOPROLOL SUCCINATE 50 MG/1
50 TABLET, EXTENDED RELEASE ORAL DAILY
Status: DISCONTINUED | OUTPATIENT
Start: 2023-02-02 | End: 2023-02-02

## 2023-02-02 RX ADMIN — HYDROCODONE BITARTRATE AND ACETAMINOPHEN 1 TABLET: 5; 325 TABLET ORAL at 08:35

## 2023-02-02 RX ADMIN — POTASSIUM CHLORIDE 10 MEQ: 750 TABLET, FILM COATED, EXTENDED RELEASE ORAL at 08:37

## 2023-02-02 RX ADMIN — ASPIRIN 81 MG 81 MG: 81 TABLET ORAL at 08:35

## 2023-02-02 RX ADMIN — FUROSEMIDE 20 MG: 20 TABLET ORAL at 08:35

## 2023-02-02 RX ADMIN — HYDROCODONE BITARTRATE AND ACETAMINOPHEN 1 TABLET: 5; 325 TABLET ORAL at 13:23

## 2023-02-02 RX ADMIN — METOPROLOL TARTRATE 25 MG: 25 TABLET, FILM COATED ORAL at 12:11

## 2023-02-02 RX ADMIN — SODIUM CHLORIDE, PRESERVATIVE FREE 10 ML: 5 INJECTION INTRAVENOUS at 08:37

## 2023-02-02 RX ADMIN — HYDROCODONE BITARTRATE AND ACETAMINOPHEN 1 TABLET: 5; 325 TABLET ORAL at 04:09

## 2023-02-02 ASSESSMENT — PAIN DESCRIPTION - ONSET
ONSET: ON-GOING

## 2023-02-02 ASSESSMENT — PAIN DESCRIPTION - PAIN TYPE
TYPE: SURGICAL PAIN

## 2023-02-02 ASSESSMENT — PAIN DESCRIPTION - DESCRIPTORS
DESCRIPTORS: DISCOMFORT
DESCRIPTORS: ACHING
DESCRIPTORS: SORE
DESCRIPTORS: ACHING

## 2023-02-02 ASSESSMENT — PAIN SCALES - GENERAL
PAINLEVEL_OUTOF10: 8
PAINLEVEL_OUTOF10: 2
PAINLEVEL_OUTOF10: 2
PAINLEVEL_OUTOF10: 9
PAINLEVEL_OUTOF10: 7
PAINLEVEL_OUTOF10: 4
PAINLEVEL_OUTOF10: 2

## 2023-02-02 ASSESSMENT — PAIN - FUNCTIONAL ASSESSMENT
PAIN_FUNCTIONAL_ASSESSMENT: ACTIVITIES ARE NOT PREVENTED
PAIN_FUNCTIONAL_ASSESSMENT: ACTIVITIES ARE NOT PREVENTED
PAIN_FUNCTIONAL_ASSESSMENT: PREVENTS OR INTERFERES SOME ACTIVE ACTIVITIES AND ADLS
PAIN_FUNCTIONAL_ASSESSMENT: ACTIVITIES ARE NOT PREVENTED
PAIN_FUNCTIONAL_ASSESSMENT: ACTIVITIES ARE NOT PREVENTED

## 2023-02-02 ASSESSMENT — PAIN DESCRIPTION - ORIENTATION
ORIENTATION: RIGHT;LEFT

## 2023-02-02 ASSESSMENT — PAIN DESCRIPTION - FREQUENCY
FREQUENCY: INTERMITTENT
FREQUENCY: CONTINUOUS
FREQUENCY: CONTINUOUS
FREQUENCY: INTERMITTENT
FREQUENCY: INTERMITTENT

## 2023-02-02 ASSESSMENT — PAIN DESCRIPTION - LOCATION
LOCATION: GROIN

## 2023-02-02 NOTE — DISCHARGE INSTRUCTIONS
Learning About Transcatheter Aortic Valve Implantation (FELICITY)  What is FELICITY? Transcatheter aortic valve implantation (FELICITY) is a procedure to implant a replacement aortic valve in the heart. It is also called transcatheter aortic valve replacement (TAVR). It is done to treat aortic valve stenosis. In aortic valve stenosis, the valve between your heart and the large blood vessel that carries blood to the body (aorta) has narrowed. That forces the heart to pump harder to get enough blood through the valve. FELICITY can help some people feel better and live longer. In FELICITY, the doctor uses a thin, flexible tube called a catheter to put in the new heart valve. FELICITY is not an open-heart surgery. How is FELICITY done? FELICITY is typically done through an incision (cut) in the groin. But sometimes a small cut is made in the chest. The doctor uses a tube called a catheter and special tools that fit inside the catheter. The doctor puts the catheter into a blood vessel and moves it through the blood vessel and into the heart. A specially designed replacement valve fits inside the catheter. This valve is made of tissue and metal. The doctor then moves the new valve into the damaged aortic valve. The new valve expands and works as your new aortic valve. You may be asleep for the procedure. Or you may get medicine that relaxes you or puts you in a light sleep. You won't feel pain when the catheter is put in the blood vessel. You may stay in the hospital for up to a few days after the procedure. What can you expect after FELICITY? While you are in the hospital, your doctors and nurses will monitor you to check how the new valve is working. You will receive information from the hospital about diet, activities, and medicine. You will need to have regular checkups with your doctor.   Your doctor may suggest that you attend a cardiac rehab program. In cardiac rehab, a team of health professionals provides education and support to help you recover and prevent problems with your heart. Ask your doctor if rehab is right for you. You may take aspirin or some other blood thinner to prevent blood clots. If you get a blood thinner, be sure you get instructions about how to take your medicine safely. Blood thinners can cause serious bleeding problems. Follow-up care is a key part of your treatment and safety. Be sure to make and go to all appointments, and call your doctor if you are having problems. It's also a good idea to know your test results and keep a list of the medicines you take. Where can you learn more? Go to http://www.woods.com/ and enter L243 to learn more about \"Learning About Transcatheter Aortic Valve Implantation (FELICITY). \"  Current as of: September 7, 2022               Content Version: 13.5  © 2006-2022 [employer]. Care instructions adapted under license by [facility] ([facility]). If you have questions about a medical condition or this instruction, always ask your healthcare professional. [place] [address] any warranty or liability for your use of this information. Transcatheter Aortic Valve Implantation: Before Your Procedure  What is transcatheter aortic valve implantation? Transcatheter aortic valve implantation (FELICITY) is a procedure to implant a replacement aortic valve in the heart. It is also called transcatheter aortic valve replacement (TAVR). Your doctor will use a thin, flexible tube called a catheter to put in your new heart valve. Your doctor will put the catheter into a blood vessel in your upper leg (groin) or chest. The doctor moves the catheter through the blood vessel and into your heart. The replacement valve fits inside the catheter. The valve is made of tissue and metal. Your doctor will move the new valve into your damaged valve. It will expand and work in place of the old valve.   You may be asleep for the procedure, or you may get a sedative to help you relax. You won't feel pain when the catheter is put in the blood vessel. You may stay in the hospital for up to a few days. How do you prepare for the procedure? Procedures can be stressful. This information will help you understand what you can expect. And it will help you safely prepare for your procedure. Preparing for the procedure    You will have several tests to get ready. These may include echocardiograms and a CT scan. Be sure you have someone to take you home. Anesthesia and pain medicine will make it unsafe for you to drive or get home on your own. Understand exactly what procedure is planned, along with the risks, benefits, and other options. If you take a medicine that prevents blood clots, your doctor may tell you to stop taking it before your procedure. Or your doctor may tell you to keep taking it. (These medicines include aspirin and other blood thinners.) Make sure that you understand exactly what your doctor wants you to do. Tell your doctor ALL the medicines, vitamins, supplements, and herbal remedies you take. Some may increase the risk of problems during your procedure. Your doctor will tell you if you should stop taking any of them before the procedure and how soon to do it. Make sure your doctor and the hospital have a copy of your advance directive. If you don't have one, you may want to prepare one. It lets others know your health care wishes. It's a good thing to have before any type of surgery or procedure. What happens on the day of the procedure? Follow the instructions exactly about when to stop eating and drinking. If you don't, your procedure may be canceled. If your doctor told you to take your medicines on the day of the procedure, take them with only a sip of water. Take a bath or shower before you come in for your procedure. Do not apply lotions, perfumes, deodorants, or nail polish. Take off all jewelry and piercings.  And take out contact lenses, if you wear them. At the hospital or surgery center   Bring a picture ID. You will be kept comfortable and safe by your anesthesia provider. The anesthesia may make you sleep. Or you may get medicine that relaxes you or puts you in a light sleep. You won't feel pain when the catheter is put in the blood vessel. The procedure may take about 2 to 3 hours. After the procedure, pressure may be applied to the area where the catheter was put in your blood vessel. This will help prevent bleeding. A small device may also be used to close the blood vessel. The area may be covered with a bandage or a compression device. Nurses will check your heart rate and blood pressure. The nurse will also check the catheter site for bleeding. If the catheter was put in your groin, you will need to lie still and keep your leg straight for up to a few hours. The nurse may put a weighted bag on your leg. You may have a bruise or a small lump where the catheter was put in your blood vessel. This is normal and will go away. When should you call your doctor? You have questions or concerns. You don't understand how to prepare for your procedure. You become ill before the procedure (such as fever, flu, or a cold). You need to reschedule or have changed your mind about having the procedure. Where can you learn more? Go to http://www.woods.com/ and enter I406 to learn more about \"Transcatheter Aortic Valve Implantation: Before Your Procedure. \"  Current as of: September 7, 2022               Content Version: 13.5  © 2006-2022 [employer]. Care instructions adapted under license by [facility] ([facility]). If you have questions about a medical condition or this instruction, always ask your healthcare professional. [place] [address] any warranty or liability for your use of this information.

## 2023-02-02 NOTE — PROGRESS NOTES
Sister here. Assisted to dress. Discharge instructions given to , states understanding. Transported to private auto per wheel chair.   Assisted in to passenger seat

## 2023-02-02 NOTE — PROGRESS NOTES
Outpatient Pharmacy Progress Note for Meds-to-Beds    Total number of Prescriptions Filled: 1  The following medications were dispensed to the patient during the discharge process:  Metoprolol tartrate     Additional Documentation:  Medications given to nurse Whatley to provide to patient      Thank you for letting us serve your patients.   [address]    [address], [address] [facility]    Phone: [phone]    Fax: [phone]

## 2023-02-02 NOTE — PROGRESS NOTES
CARDIOLOGY  NOTE        Name:  Jessica Moore /Age/Sex: 1940  (81 y.o. female)   MRN & CSN:  [id number] & [id number] Admission Date/Time: 2023  5:40 AM   Location:  -A PCP: Elinor Guevara Day: 2        PLAN FROM CARDIOLOGY FOR TODAY:   Discharge home today      - cardiology consult is for: TAVR    -  Interval history: Labs reviewed EKG reviewed    ASSESSMENT/ PLAN:      Status post TAVR patient stable  Labs , ekg ok  Has st  resume bb  Check echo          Subjective: Todays complain: Patient no pain    HPI:  Patty Romero is a 81 y. o.year old who and presents with had no chief complaint listed for this encounter. No chief complaint on file. Objective: Temperature:  Current - Temp: 97.6 °F (36.4 °C); Max - Temp  Av °F (36.7 °C)  Min: 97.6 °F (36.4 °C)  Max: 98.7 °F (37.1 °C)    Respiratory Rate : Resp  Av.6  Min: 16  Max: 29    Pulse Range: Pulse  Av.7  Min: 84  Max: 120    Blood Presuure Range:  Systolic (82AMR), IH , Min:89 , DNA:540   ; Diastolic (29XLW), UAM:11, Min:41, Max:97      Pulse ox Range: SpO2  Av.1 %  Min: 89 %  Max: 99 %    24hr I & O:    Intake/Output Summary (Last 24 hours) at 2023 6102  Last data filed at 2023 5479  Gross per 24 hour   Intake 910 ml   Output 1725 ml   Net -815 ml         /87   Pulse (!) 109   Temp 97.6 °F (36.4 °C) (Oral)   Resp 29   Ht 5' 11\" (1.803 m)   Wt 209 lb (94.8 kg)   SpO2 (!) 89%   BMI 29.15 kg/m²           Review of Systems:    No pain    TELEMETRY: Sinus    has a past medical history of Arthritis, DVT of deep femoral vein (HCC), H/O echocardiogram Limited, H/O right and left heart catheterization, History of echocardiogram, Hx of Doppler echocardiogram, Hypertension, PAD (peripheral artery disease) ([address] Pneumonia, Seizures ([facility] [address] [facility] and Vertigo.    has a past surgical history that includes  section; Endoscopy, colon, diagnostic; and Colonoscopy. Physical Exam:  General:  Awake, alert, NAD  Head:normal  Eye: Pupils equal and round  Neck:  No JVD, no carotid bruit noted   Chest:  Clear to auscultation, no signs of respiratory distress  Cardiovascular:  Normal rate and rhythm. S1 and S2 noted. No murmurs rubs or gallops  Abdomen:   nontender  Extremities:  no edema  Pulses; palpable  Neuro: grossly normal    Medications:    potassium chloride  10 mEq Oral Daily with breakfast    ceFAZolin  2,000 mg IntraVENous Once    bupivacaine (PF)  30 mL IntraDERmal Once    furosemide  20 mg Oral BID    aspirin  81 mg Oral Daily    magnesium oxide  200 mg Oral Nightly    traZODone  50 mg Oral Nightly    sodium chloride flush  5-40 mL IntraVENous 2 times per day    aspirin  81 mg Oral Daily      sodium chloride      sodium chloride       sodium chloride, albuterol sulfate HFA, ondansetron, HYDROcodone-acetaminophen, sodium chloride flush, sodium chloride, acetaminophen, ondansetron    Lab Data:  CBC:   Recent Labs     02/01/23  0600 02/02/23  0405   WBC 4.8 7.7   HGB 12.5 11.0*   HCT 40.3 34.9*   MCV 96.4 94.8    145     BMP:   Recent Labs     02/01/23  0600 02/02/23  0405    134*   K 4.4 4.0    99   CO2 27 27   BUN 17 12   CREATININE 0.6 0.5*     LIVER PROFILE:   Recent Labs     02/01/23  0600   AST 18   ALT 10   BILITOT 0.4   ALKPHOS 82     PT/INR:   Recent Labs     02/01/23  0600   PROTIME 11.2*   INR 0.87     APTT:   Recent Labs     02/01/23  0600   APTT 33.1     BNP:  No results for input(s): BNP in the last 72 hours. TROPONIN: No results for input(s): TROPONINI in the last 72 hours. No results for input(s): TROPONINT in the last 72 hours.      Labs, consult, tests reviewed                    Nano Vee MD, PA-C 2/2/2023 9:53 AM     Please note this report has been partially produced using speech recognition software and may contain errors related to that system including errors in grammar, punctuation, and spelling, as well as words and phrases that may be inappropriate. If there are any questions or concerns please feel free to contact the dictating provider for clarification.

## 2023-02-02 NOTE — DISCHARGE SUMMARY
[facility]  Discharge Summary    Maday Ellison  :  1940  MRN:  [id number]    Admit date:  2023  Discharge date:      Admitting Physician:  Hermelinda Aguilera MD    Discharge Diagnoses:    Patient Active Problem List   Diagnosis    Coumadin toxicity    Pulmonary embolism and infarction [facility])    DVT (deep vein thrombosis) in pregnancy    Acute deep vein thrombosis (DVT) of right lower extremity (HCC)    Precordial pain    DVT of axillary vein, acute left (HCC)    Severe aortic stenosis    VHD (valvular heart disease)    Aortic stenosis, severe       Admission Condition:  good    Discharged Condition:  good    Hospital Course:   Elective TAVR for Severe Aotic stenosis using 34 mm medtronic valve    Discharge Medications:      Medication List        CHANGE how you take these medications      * metoprolol tartrate 25 MG tablet  Commonly known as: LOPRESSOR  Take 1 tablet by mouth 2 times daily  What changed: how much to take     * metoprolol tartrate 25 MG tablet  Commonly known as: LOPRESSOR  Take 1 tablet by mouth 2 times daily  What changed: You were already taking a medication with the same name, and this prescription was added. Make sure you understand how and when to take each. * This list has 2 medication(s) that are the same as other medications prescribed for you. Read the directions carefully, and ask your doctor or other care provider to review them with you.                 CONTINUE taking these medications      albuterol sulfate  (90 Base) MCG/ACT inhaler  Commonly known as: PROVENTIL;VENTOLIN;PROAIR     aspirin 81 MG tablet     furosemide 20 MG tablet  Commonly known as: LASIX     HYDROcodone-acetaminophen 5-325 MG per tablet  Commonly known as: NORCO     magnesium 30 MG tablet     ondansetron 4 MG disintegrating tablet  Commonly known as: ZOFRAN-ODT  Take 1 tablet by mouth every 8 hours as needed for Nausea or Vomiting     potassium chloride 20 MEQ packet  Commonly known as: KLOR-CON rivaroxaban 20 MG Tabs tablet  Commonly known as: Xarelto  Take 1 tablet by mouth Daily with supper     traZODone 50 MG tablet  Commonly known as: DESYREL  Take 1 tablet by mouth nightly               Where to Get Your Medications        These medications were sent to [address] [address], [address] [place] [address]       Phone: [phone]   metoprolol tartrate 25 MG tablet         Consults:  Ct surgery     Significant Diagnostic Studies:  see cath     Treatments:   post PCI    Discharge Exam:  /87   Pulse (!) 109   Temp 97.6 °F (36.4 °C) (Oral)   Resp 29   Ht 5' 11\" (1.803 m)   Wt 209 lb (94.8 kg)   SpO2 (!) 89%   BMI 29.15 kg/m²     General Appearance:    Alert, cooperative, no distress, appears stated age   Head:    Normocephalic, without obvious abnormality, atraumatic   Eyes:    PERRL, conjunctiva/corneas clear, EOM's intact, fundi     benign, both eyes        Ears:    Normal TM's and external ear canals, both ears   Nose:   Nares normal, septum midline, mucosa normal, no drainage    or sinus tenderness   Throat:   Lips, mucosa, and tongue normal; teeth and gums normal   Neck:   Supple, symmetrical, trachea midline, no adenopathy;        thyroid:  No enlargement/tenderness/nodules; no carotid    bruit or JVD   Back:     Symmetric, no curvature, ROM normal, no CVA tenderness   Lungs:     Clear to auscultation bilaterally, respirations unlabored   Chest wall:    No tenderness or deformity   Heart:    Regular rate and rhythm, S1 and S2 normal, no murmur, rub   or gallop   Abdomen:     Soft, non-tender, bowel sounds active all four quadrants,     no masses, no organomegaly           Extremities:   Extremities normal, atraumatic, no cyanosis or edema   Pulses:   2+ and symmetric all extremities   Skin:   Skin color, texture, turgor normal, no rashes or lesions   Lymph nodes:   Cervical, supraclavicular, and axillary nodes normal   Neurologic:   CNII-XII intact.  Normal strength, sensation and reflexes       throughout       Disposition:   home    Signed:  Carolyn Raman MD  2/2/2023, 10:31 AM

## 2023-02-02 NOTE — PROGRESS NOTES
Discharge instructions  given to patient, states understanding.   Waiting  for sister to come at 0 for transport home

## 2023-02-03 LAB
ABO/RH: NORMAL
ANTIBODY SCREEN: NEGATIVE
COMPONENT: NORMAL
COMPONENT: NORMAL
CROSSMATCH RESULT: NORMAL
CROSSMATCH RESULT: NORMAL
STATUS: NORMAL
STATUS: NORMAL
TRANSFUSION STATUS: NORMAL
TRANSFUSION STATUS: NORMAL
UNIT DIVISION: 0
UNIT DIVISION: 0
UNIT NUMBER: NORMAL
UNIT NUMBER: NORMAL

## 2023-02-08 ENCOUNTER — HOSPITAL ENCOUNTER (OUTPATIENT)
Dept: CARDIAC CATH/INVASIVE PROCEDURES | Age: 83
Discharge: HOME | End: 2023-02-08
Attending: INTERNAL MEDICINE | Admitting: INTERNAL MEDICINE
Payer: COMMERCIAL

## 2023-02-08 ENCOUNTER — HOSPITAL ENCOUNTER (OUTPATIENT)
Age: 83
Discharge: HOME | End: 2023-02-08
Payer: COMMERCIAL

## 2023-02-08 VITALS
SYSTOLIC BLOOD PRESSURE: 151 MMHG | HEIGHT: 71 IN | DIASTOLIC BLOOD PRESSURE: 74 MMHG | WEIGHT: 209 LBS | BODY MASS INDEX: 29.26 KG/M2 | OXYGEN SATURATION: 96 % | HEART RATE: 94 BPM | RESPIRATION RATE: 24 BRPM

## 2023-02-08 LAB
ALBUMIN SERPL-MCNC: 3.9 GM/DL (ref 3.4–5)
ALP BLD-CCNC: 80 IU/L (ref 40–128)
ALT SERPL-CCNC: 9 U/L (ref 10–40)
ANION GAP SERPL CALCULATED.3IONS-SCNC: 14 MMOL/L (ref 4–16)
APTT: 33.6 SECONDS (ref 25.1–37.1)
AST SERPL-CCNC: 17 IU/L (ref 15–37)
BILIRUB SERPL-MCNC: 0.5 MG/DL (ref 0–1)
BUN SERPL-MCNC: 9 MG/DL (ref 6–23)
CALCIUM SERPL-MCNC: 9.5 MG/DL (ref 8.3–10.6)
CHLORIDE BLD-SCNC: 101 MMOL/L (ref 99–110)
CO2: 25 MMOL/L (ref 21–32)
CREAT SERPL-MCNC: 0.6 MG/DL (ref 0.6–1.1)
GFR SERPL CREATININE-BSD FRML MDRD: >60 ML/MIN/1.73M2
GLUCOSE SERPL-MCNC: 104 MG/DL (ref 70–99)
HCT VFR BLD CALC: 38.8 % (ref 37–47)
HEMOGLOBIN: 12 GM/DL (ref 12.5–16)
INR BLD: 0.92 INDEX
MCH RBC QN AUTO: 29.8 PG (ref 27–31)
MCHC RBC AUTO-ENTMCNC: 30.9 % (ref 32–36)
MCV RBC AUTO: 96.3 FL (ref 78–100)
PDW BLD-RTO: 12.3 % (ref 11.7–14.9)
PLATELET # BLD: 178 K/CU MM (ref 140–440)
PMV BLD AUTO: 9.6 FL (ref 7.5–11.1)
POTASSIUM SERPL-SCNC: 4.2 MMOL/L (ref 3.5–5.1)
PRO-BNP: 640.9 PG/ML
PROTHROMBIN TIME: 11.8 SECONDS (ref 11.7–14.5)
RBC # BLD: 4.03 M/CU MM (ref 4.2–5.4)
SODIUM BLD-SCNC: 140 MMOL/L (ref 135–145)
TOTAL PROTEIN: 6.8 GM/DL (ref 6.4–8.2)
WBC # BLD: 5.1 K/CU MM (ref 4–10.5)

## 2023-02-08 PROCEDURE — 85027 COMPLETE CBC AUTOMATED: CPT

## 2023-02-08 PROCEDURE — 85730 THROMBOPLASTIN TIME PARTIAL: CPT

## 2023-02-08 PROCEDURE — 80053 COMPREHEN METABOLIC PANEL: CPT

## 2023-02-08 PROCEDURE — 36415 COLL VENOUS BLD VENIPUNCTURE: CPT

## 2023-02-08 PROCEDURE — 83880 ASSAY OF NATRIURETIC PEPTIDE: CPT

## 2023-02-08 PROCEDURE — 85610 PROTHROMBIN TIME: CPT

## 2023-02-08 PROCEDURE — 93005 ELECTROCARDIOGRAM TRACING: CPT | Performed by: INTERNAL MEDICINE

## 2023-02-09 LAB
EKG ATRIAL RATE: 90 BPM
EKG DIAGNOSIS: NORMAL
EKG P AXIS: 43 DEGREES
EKG P-R INTERVAL: 192 MS
EKG Q-T INTERVAL: 358 MS
EKG QRS DURATION: 102 MS
EKG QTC CALCULATION (BAZETT): 437 MS
EKG R AXIS: 28 DEGREES
EKG T AXIS: 50 DEGREES
EKG VENTRICULAR RATE: 90 BPM

## 2023-03-01 ENCOUNTER — HOSPITAL ENCOUNTER (OUTPATIENT)
Dept: NON INVASIVE DIAGNOSTICS | Age: 83
Discharge: HOME | End: 2023-03-01
Payer: COMMERCIAL

## 2023-03-01 ENCOUNTER — HOSPITAL ENCOUNTER (OUTPATIENT)
Dept: CARDIAC CATH/INVASIVE PROCEDURES | Age: 83
Discharge: HOME | End: 2023-03-01
Attending: INTERNAL MEDICINE | Admitting: INTERNAL MEDICINE
Payer: COMMERCIAL

## 2023-03-01 ENCOUNTER — HOSPITAL ENCOUNTER (OUTPATIENT)
Age: 83
Discharge: HOME | End: 2023-03-01
Payer: COMMERCIAL

## 2023-03-01 VITALS
WEIGHT: 202 LBS | DIASTOLIC BLOOD PRESSURE: 80 MMHG | HEART RATE: 84 BPM | HEIGHT: 71 IN | BODY MASS INDEX: 28.28 KG/M2 | SYSTOLIC BLOOD PRESSURE: 162 MMHG | OXYGEN SATURATION: 97 % | RESPIRATION RATE: 14 BRPM

## 2023-03-01 LAB
ALBUMIN SERPL-MCNC: 4.1 GM/DL (ref 3.4–5)
ALP BLD-CCNC: 89 IU/L (ref 40–129)
ALT SERPL-CCNC: 6 U/L (ref 10–40)
ANION GAP SERPL CALCULATED.3IONS-SCNC: 10 MMOL/L (ref 4–16)
APTT: 39.5 SECONDS (ref 25.1–37.1)
AST SERPL-CCNC: 16 IU/L (ref 15–37)
BILIRUB SERPL-MCNC: 0.5 MG/DL (ref 0–1)
BUN SERPL-MCNC: 14 MG/DL (ref 6–23)
CALCIUM SERPL-MCNC: 9.3 MG/DL (ref 8.3–10.6)
CHLORIDE BLD-SCNC: 102 MMOL/L (ref 99–110)
CO2: 25 MMOL/L (ref 21–32)
CREAT SERPL-MCNC: 0.6 MG/DL (ref 0.6–1.1)
EKG ATRIAL RATE: 84 BPM
EKG DIAGNOSIS: NORMAL
EKG P AXIS: 42 DEGREES
EKG P-R INTERVAL: 176 MS
EKG Q-T INTERVAL: 390 MS
EKG QRS DURATION: 98 MS
EKG QTC CALCULATION (BAZETT): 460 MS
EKG R AXIS: 16 DEGREES
EKG T AXIS: 44 DEGREES
EKG VENTRICULAR RATE: 84 BPM
GFR SERPL CREATININE-BSD FRML MDRD: >60 ML/MIN/1.73M2
GLUCOSE SERPL-MCNC: 100 MG/DL (ref 70–99)
HCT VFR BLD CALC: 40.6 % (ref 37–47)
HEMOGLOBIN: 12.3 GM/DL (ref 12.5–16)
INR BLD: 1.12 INDEX
MCH RBC QN AUTO: 29.8 PG (ref 27–31)
MCHC RBC AUTO-ENTMCNC: 30.3 % (ref 32–36)
MCV RBC AUTO: 98.3 FL (ref 78–100)
PDW BLD-RTO: 12.3 % (ref 11.7–14.9)
PLATELET # BLD: 156 K/CU MM (ref 140–440)
PMV BLD AUTO: 9.8 FL (ref 7.5–11.1)
POTASSIUM SERPL-SCNC: 4.2 MMOL/L (ref 3.5–5.1)
PRO-BNP: 357.8 PG/ML
PROTHROMBIN TIME: 14.4 SECONDS (ref 11.7–14.5)
RBC # BLD: 4.13 M/CU MM (ref 4.2–5.4)
SODIUM BLD-SCNC: 137 MMOL/L (ref 135–145)
TOTAL PROTEIN: 6.8 GM/DL (ref 6.4–8.2)
WBC # BLD: 5 K/CU MM (ref 4–10.5)

## 2023-03-01 PROCEDURE — 80053 COMPREHEN METABOLIC PANEL: CPT

## 2023-03-01 PROCEDURE — 85610 PROTHROMBIN TIME: CPT

## 2023-03-01 PROCEDURE — 85027 COMPLETE CBC AUTOMATED: CPT

## 2023-03-01 PROCEDURE — 83880 ASSAY OF NATRIURETIC PEPTIDE: CPT

## 2023-03-01 PROCEDURE — 85730 THROMBOPLASTIN TIME PARTIAL: CPT

## 2023-03-01 PROCEDURE — 93306 TTE W/DOPPLER COMPLETE: CPT

## 2023-03-01 PROCEDURE — 93005 ELECTROCARDIOGRAM TRACING: CPT | Performed by: INTERNAL MEDICINE

## 2023-03-01 PROCEDURE — 36415 COLL VENOUS BLD VENIPUNCTURE: CPT

## 2023-03-01 RX ORDER — METOPROLOL TARTRATE 50 MG/1
50 TABLET, FILM COATED ORAL 2 TIMES DAILY
Qty: 90 TABLET | Refills: 4 | Status: SHIPPED | OUTPATIENT
Start: 2023-03-01

## 2023-03-01 NOTE — PROGRESS NOTES
CARDIOLOGY TAVR CLINIC   NOTE    Chief Complaint: Post TAVR     HPI:   Malik Madrigal is a 81y.o. year old who has Past medical history as noted below. C/o cough and some pain sharp lasting few seconds below left breast   Bp[ is running high , drinks lot of water , no ankle swelling      No current facility-administered medications for this encounter. Allergies:   Coumadin [warfarin sodium], Ambien [zolpidem tartrate], and Morphine sulfate    Patient History:  Past Medical History:   Diagnosis Date    Arthritis     DVT of deep femoral vein ([facility] [address] 2016    H/O echocardiogram Limited 2019    MOD AS, JOLENE 1.3 sq    H/O right and left heart catheterization 11/15/2022    Moderate to severe aortic stenosis, normal coronaries. TAVR referral    History of echocardiogram 2019    EF >60% mild to moderate TR , MOD AS JOLENE 1.17 sq, Mod PHTN, Grade II DD. Hx of Doppler echocardiogram 2022    EF 55-60% Severe LV hypertrophy. Mildly dilated LA. Heavily calcified aortic valve with severe AS. Mitral annular calcification and the tips of the mitral valve leaflets are calcified. Mild MR, TR, and mod AR. Hypertension     PAD (peripheral artery disease) (HCC)     Pneumonia     Seizures (HCC)     Vertigo      Past Surgical History:   Procedure Laterality Date    ANOMALOUS PULMONARY VENOUS RETURN REPAIR, TOTAL  2023    CARDIAC SURGERY       SECTION      COLONOSCOPY      ENDOSCOPY, COLON, DIAGNOSTIC       Family History   Problem Relation Age of Onset    Cancer Mother         BREAST BILAT     Social History     Tobacco Use    Smoking status: Former     Packs/day: 0.25     Years: 50.00     Pack years: 12.50     Types: Cigarettes     Quit date: 2022     Years since quittin.5    Smokeless tobacco: Never   Substance Use Topics    Alcohol use: Not Currently     Alcohol/week: 2.0 standard drinks     Types: 2 Glasses of wine per week     Comment: occ.  caffeine: a cup coffee daily. Review of Systems:   Constitutional: No Fever or Weight Loss   Eyes: No Decreased Vision  ENT: No Headaches, Hearing Loss or Vertigo  Cardiovascular: as per note above   Respiratory: No cough or wheezing and as per note above. Gastrointestinal: No abdominal pain, appetite loss, blood in stools, constipation, diarrhea or heartburn  Genitourinary: No dysuria, trouble voiding, or hematuria  Musculoskeletal:  denies any new  joint aches , swelling  or pain   Integumentary: No rash or pruritis  Neurological: No TIA or stroke symptoms  Psychiatric: No anxiety or depression  Endocrine: No malaise, fatigue or temperature intolerance  Hematologic/Lymphatic: No bleeding problems, blood clots or swollen lymph nodes  Allergic/Immunologic: No nasal congestion or hives    Objective:      Physical Exam:  BP (!) 162/80   Pulse 84   Resp 14   Ht 5' 11\" (1.803 m)   Wt 202 lb (91.6 kg)   SpO2 97%   BMI 28.17 kg/m²   Wt Readings from Last 3 Encounters:   03/01/23 202 lb (91.6 kg)   02/08/23 209 lb (94.8 kg)   02/01/23 209 lb (94.8 kg)     Body mass index is 28.17 kg/m². Vitals:    03/01/23 0927   BP: (!) 162/80   Pulse: 84   Resp: 14   SpO2: 97%        General Appearance:  No distress, conversant  Constitutional:  Well developed, Well nourished, No acute distress, Non-toxic appearance. HENT:  Normocephalic, Atraumatic, Bilateral external ears normal, Oropharynx moist, No oral exudates, Nose normal. Neck- Normal range of motion, No tenderness, Supple, No stridor,no apical-carotid delay  Eyes:  PERRL, EOMI, Conjunctiva normal, No discharge. Respiratory:  Normal breath sounds, No respiratory distress, No wheezing, No chest tenderness. ,no use of accessory muscles, NO crackles  Cardiovascular: (PMI) apex non displaced,no lifts no thrills,S1 and S2 audible, No added heart sounds, No signs of ankle edema, or volume overload, No evidence of JVD, No crackles   GI:  Bowel sounds normal, Soft, No tenderness, No masses, No gross visceromegaly   :  No costovertebral angle tenderness   Musculoskeletal:  No edema, no tenderness, no deformities. Back- no tenderness  Integument:  Well hydrated, no rash   Lymphatic:  No lymphadenopathy noted   Neurologic:  Alert & oriented x 3, CN 2-12 normal, normal motor function, normal sensory function, no focal deficits noted   Psychiatric:  Speech and behavior appropriate       Medical decision making and Data review:  DATA:  Lab Results   Component Value Date    TROPONINT <0.010 12/17/2021     BNP:    Lab Results   Component Value Date    PROBNP 357.8 (H) 03/01/2023     PT/INR:  No results found for: [employer]  Lab Results   Component Value Date    LABA1C 5.8 03/09/2012     Lab Results   Component Value Date    CHOL 231 (H) 03/09/2012    TRIG 96 03/09/2012    HDL 67 03/09/2012    LDLDIRECT 166 (H) 03/09/2012     Lab Results   Component Value Date    ALT 6 (L) 03/01/2023    AST 16 03/01/2023     Recent Labs     03/01/23  0845   WBC 5.0   HGB 12.3*   HCT 40.6   MCV 98.3        TSH: No results found for: TSH  Lab Results   Component Value Date    AST 16 03/01/2023    ALT 6 (L) 03/01/2023    BILIDIR 0.2 03/23/2015    BILITOT 0.5 03/01/2023    ALKPHOS 89 03/01/2023     Lab Results   Component Value Date    PROBNP 357.8 (H) 03/01/2023    PROBNP 640.9 (H) 02/08/2023    PROBNP 162.5 02/01/2023     Lab Results   Component Value Date    LABA1C 5.8 03/09/2012     Lab Results   Component Value Date    WBC 5.0 03/01/2023    HGB 12.3 (L) 03/01/2023    HCT 40.6 03/01/2023     03/01/2023     All labs, medications and tests reviewed by myself including data and history from outside source , patient and available family . Assessment & Plan:    Increase metoprolol 50 mg bid  Does not want to go to rehab  reduce\ lasix to 20 mg daily  Take ant allergy   For cough         Counseled extensively and medication compliance urged.   We discussed that for the  prevention of ASCVD our  goal is aggressive risk modification. Patient is encouraged to exercise if they can , educated about  brisk walk for 30 minutes  at least 3 to 4 times a week if there are no physical limitations  Various goals were discussed and questions answered. Continue current medications. Appropriate prescriptions are addressed and refills ordered. Questions answered and patient verbalizes understanding. Call for any problems, questions, or concerns. Greater than 60 % of time spent counseling besides reviewing data and images     Continue all other medications of all above medical condition listed as is. No follow-ups on file. Please note this report has been partially produced using speech recognition software and may contain errors related to that system including errors in grammar, punctuation, and spelling, as well as words and phrases that may be inappropriate. If there are any questions or concerns please feel free to contact the dictating provider for clarification.

## 2023-03-01 NOTE — PROGRESS NOTES
Pt arrives to valve clinic  for 1 month post TAVR appt. Complains of intermittent pain in left side of chest. Complains of chest congestion and need for coughing. Production from cough is clear. Pt states at times the pain in stabbing and will wake her up at night.

## 2023-03-17 ENCOUNTER — OFFICE VISIT (OUTPATIENT)
Dept: CARDIOLOGY CLINIC | Age: 83
End: 2023-03-17
Payer: COMMERCIAL

## 2023-03-17 VITALS
HEIGHT: 71 IN | WEIGHT: 208.6 LBS | HEART RATE: 80 BPM | BODY MASS INDEX: 29.2 KG/M2 | SYSTOLIC BLOOD PRESSURE: 108 MMHG | DIASTOLIC BLOOD PRESSURE: 66 MMHG | OXYGEN SATURATION: 96 %

## 2023-03-17 DIAGNOSIS — R07.89 OTHER CHEST PAIN: Primary | ICD-10-CM

## 2023-03-17 PROCEDURE — G8400 PT W/DXA NO RESULTS DOC: HCPCS | Performed by: INTERNAL MEDICINE

## 2023-03-17 PROCEDURE — G8427 DOCREV CUR MEDS BY ELIG CLIN: HCPCS | Performed by: INTERNAL MEDICINE

## 2023-03-17 PROCEDURE — G8417 CALC BMI ABV UP PARAM F/U: HCPCS | Performed by: INTERNAL MEDICINE

## 2023-03-17 PROCEDURE — 99214 OFFICE O/P EST MOD 30 MIN: CPT | Performed by: INTERNAL MEDICINE

## 2023-03-17 PROCEDURE — 93000 ELECTROCARDIOGRAM COMPLETE: CPT | Performed by: INTERNAL MEDICINE

## 2023-03-17 PROCEDURE — 1090F PRES/ABSN URINE INCON ASSESS: CPT | Performed by: INTERNAL MEDICINE

## 2023-03-17 PROCEDURE — 1124F ACP DISCUSS-NO DSCNMKR DOCD: CPT | Performed by: INTERNAL MEDICINE

## 2023-03-17 PROCEDURE — G8484 FLU IMMUNIZE NO ADMIN: HCPCS | Performed by: INTERNAL MEDICINE

## 2023-03-17 PROCEDURE — 1036F TOBACCO NON-USER: CPT | Performed by: INTERNAL MEDICINE

## 2023-03-17 RX ORDER — ONDANSETRON 4 MG/1
4 TABLET, FILM COATED ORAL DAILY PRN
Qty: 30 TABLET | Refills: 0 | Status: SHIPPED | OUTPATIENT
Start: 2023-03-17

## 2023-06-20 ENCOUNTER — OFFICE VISIT (OUTPATIENT)
Dept: CARDIOLOGY CLINIC | Age: 83
End: 2023-06-20
Payer: COMMERCIAL

## 2023-06-20 VITALS
SYSTOLIC BLOOD PRESSURE: 118 MMHG | OXYGEN SATURATION: 97 % | BODY MASS INDEX: 30.1 KG/M2 | HEART RATE: 106 BPM | DIASTOLIC BLOOD PRESSURE: 70 MMHG | WEIGHT: 215 LBS | RESPIRATION RATE: 18 BRPM | HEIGHT: 71 IN

## 2023-06-20 DIAGNOSIS — I38 VHD (VALVULAR HEART DISEASE): Primary | ICD-10-CM

## 2023-06-20 DIAGNOSIS — R06.02 SOB (SHORTNESS OF BREATH): ICD-10-CM

## 2023-06-20 DIAGNOSIS — Z86.711 HISTORY OF PULMONARY ARTERY THROMBOSIS: ICD-10-CM

## 2023-06-20 DIAGNOSIS — I10 PRIMARY HYPERTENSION: ICD-10-CM

## 2023-06-20 DIAGNOSIS — Z86.718 H/O DEEP VENOUS THROMBOSIS: ICD-10-CM

## 2023-06-20 PROCEDURE — 3074F SYST BP LT 130 MM HG: CPT | Performed by: NURSE PRACTITIONER

## 2023-06-20 PROCEDURE — 1036F TOBACCO NON-USER: CPT | Performed by: NURSE PRACTITIONER

## 2023-06-20 PROCEDURE — G8427 DOCREV CUR MEDS BY ELIG CLIN: HCPCS | Performed by: NURSE PRACTITIONER

## 2023-06-20 PROCEDURE — 3078F DIAST BP <80 MM HG: CPT | Performed by: NURSE PRACTITIONER

## 2023-06-20 PROCEDURE — 1090F PRES/ABSN URINE INCON ASSESS: CPT | Performed by: NURSE PRACTITIONER

## 2023-06-20 PROCEDURE — 1124F ACP DISCUSS-NO DSCNMKR DOCD: CPT | Performed by: NURSE PRACTITIONER

## 2023-06-20 PROCEDURE — 99214 OFFICE O/P EST MOD 30 MIN: CPT | Performed by: NURSE PRACTITIONER

## 2023-06-20 PROCEDURE — G8417 CALC BMI ABV UP PARAM F/U: HCPCS | Performed by: NURSE PRACTITIONER

## 2023-06-20 PROCEDURE — G8400 PT W/DXA NO RESULTS DOC: HCPCS | Performed by: NURSE PRACTITIONER

## 2023-06-20 ASSESSMENT — ENCOUNTER SYMPTOMS
BACK PAIN: 1
SHORTNESS OF BREATH: 1

## 2023-06-20 NOTE — ASSESSMENT & PLAN NOTE
- Right popliteal DVT found in 2021 follow-up ultrasound shows DVT has resolved. Patient denies any lower extremity edema or pain.

## 2023-06-20 NOTE — ASSESSMENT & PLAN NOTE
- Ongoing shortness of breath. Reports symptoms have not improved since TAVR procedure. Follow-up echocardiogram shows stable valve. Patient does have a history of COPD but has not seen pulmonology. Will refer to pulmonologist.  She has seen Dr. Jabari Garcia in the past.  Patient appears euvolemic at this time continue with Lasix 20 mg twice daily.

## 2023-06-20 NOTE — PROGRESS NOTES
Vertigo        Current Outpatient Medications   Medication Sig Dispense Refill    ondansetron (ZOFRAN) 4 MG tablet Take 1 tablet by mouth daily as needed for Nausea or Vomiting 30 tablet 0    metoprolol tartrate (LOPRESSOR) 50 MG tablet Take 1 tablet by mouth 2 times daily 90 tablet 4    rivaroxaban (XARELTO) 20 MG TABS tablet Take 1 tablet by mouth Daily with supper 30 tablet 0    HYDROcodone-acetaminophen (NORCO) 5-325 MG per tablet       traZODone (DESYREL) 50 MG tablet Take 1 tablet by mouth nightly 30 tablet 0    albuterol sulfate  (90 Base) MCG/ACT inhaler Inhale 2 puffs into the lungs every 6 hours as needed for Wheezing      potassium chloride (KLOR-CON) 20 MEQ packet Take 10 mEq by mouth daily      magnesium 30 MG tablet Take 200 mg by mouth nightly       furosemide (LASIX) 20 MG tablet Take 1 tablet by mouth 2 times daily Will only take Lasix once daily if away from home      aspirin 81 MG tablet Take 1 tablet by mouth daily      ondansetron (ZOFRAN-ODT) 4 MG disintegrating tablet Take 1 tablet by mouth every 8 hours as needed for Nausea or Vomiting 10 tablet 0     No current facility-administered medications for this visit. Review of Systems:  Review of Systems   Constitutional:  Positive for fatigue. Respiratory:  Positive for shortness of breath. Cardiovascular:  Negative for chest pain, palpitations and leg swelling. Musculoskeletal:  Positive for arthralgias and back pain. Skin: Negative. Neurological:  Negative for dizziness and weakness. All other systems reviewed and are negative. Objective:      Physical Exam:  /70 (Site: Left Upper Arm, Position: Sitting, Cuff Size: Medium Adult)   Pulse (!) 106   Resp 18   Ht 5' 11\" (1.803 m)   Wt 215 lb (97.5 kg)   SpO2 97%   BMI 29.99 kg/m²   Wt Readings from Last 3 Encounters:   06/20/23 215 lb (97.5 kg)   03/17/23 208 lb 9.6 oz (94.6 kg)   03/01/23 202 lb (91.6 kg)     Body mass index is 29.99 kg/m².     Physical

## 2023-10-19 ENCOUNTER — HOSPITAL ENCOUNTER (OUTPATIENT)
Age: 83
Setting detail: OBSERVATION
Discharge: HOME | End: 2023-10-20
Attending: STUDENT IN AN ORGANIZED HEALTH CARE EDUCATION/TRAINING PROGRAM | Admitting: STUDENT IN AN ORGANIZED HEALTH CARE EDUCATION/TRAINING PROGRAM
Payer: COMMERCIAL

## 2023-10-19 ENCOUNTER — APPOINTMENT (OUTPATIENT)
Dept: GENERAL RADIOLOGY | Age: 83
End: 2023-10-19
Payer: COMMERCIAL

## 2023-10-19 ENCOUNTER — APPOINTMENT (OUTPATIENT)
Dept: CT IMAGING | Age: 83
End: 2023-10-19
Payer: COMMERCIAL

## 2023-10-19 DIAGNOSIS — R79.0 LOW MAGNESIUM LEVEL: ICD-10-CM

## 2023-10-19 DIAGNOSIS — R06.09 DOE (DYSPNEA ON EXERTION): Primary | ICD-10-CM

## 2023-10-19 DIAGNOSIS — R07.9 CHEST PAIN, UNSPECIFIED TYPE: ICD-10-CM

## 2023-10-19 LAB
ALBUMIN SERPL-MCNC: 4 GM/DL (ref 3.4–5)
ALP BLD-CCNC: 97 IU/L (ref 40–129)
ALT SERPL-CCNC: 6 U/L (ref 10–40)
ANION GAP SERPL CALCULATED.3IONS-SCNC: 10 MMOL/L (ref 4–16)
AST SERPL-CCNC: 17 IU/L (ref 15–37)
BASOPHILS ABSOLUTE: 0 K/CU MM
BASOPHILS RELATIVE PERCENT: 0.8 % (ref 0–1)
BILIRUB SERPL-MCNC: 0.9 MG/DL (ref 0–1)
BILIRUBIN URINE: NEGATIVE MG/DL
BLOOD, URINE: NEGATIVE
BUN SERPL-MCNC: 10 MG/DL (ref 6–23)
CALCIUM SERPL-MCNC: 9.4 MG/DL (ref 8.3–10.6)
CHLORIDE BLD-SCNC: 104 MMOL/L (ref 99–110)
CLARITY: CLEAR
CO2: 27 MMOL/L (ref 21–32)
COLOR: YELLOW
COMMENT UA: NORMAL
CREAT SERPL-MCNC: 0.7 MG/DL (ref 0.6–1.1)
DIFFERENTIAL TYPE: ABNORMAL
EKG ATRIAL RATE: 78 BPM
EKG DIAGNOSIS: NORMAL
EKG P AXIS: 105 DEGREES
EKG P-R INTERVAL: 172 MS
EKG Q-T INTERVAL: 394 MS
EKG QRS DURATION: 96 MS
EKG QTC CALCULATION (BAZETT): 449 MS
EKG R AXIS: 14 DEGREES
EKG T AXIS: 38 DEGREES
EKG VENTRICULAR RATE: 78 BPM
EOSINOPHILS ABSOLUTE: 0.3 K/CU MM
EOSINOPHILS RELATIVE PERCENT: 5.3 % (ref 0–3)
GFR SERPL CREATININE-BSD FRML MDRD: >60 ML/MIN/1.73M2
GLUCOSE SERPL-MCNC: 121 MG/DL (ref 70–99)
GLUCOSE, URINE: NEGATIVE MG/DL
HCT VFR BLD CALC: 41.2 % (ref 37–47)
HEMOGLOBIN: 12.8 GM/DL (ref 12.5–16)
IMMATURE NEUTROPHIL %: 0.2 % (ref 0–0.43)
KETONES, URINE: NEGATIVE MG/DL
LEUKOCYTE ESTERASE, URINE: NEGATIVE
LYMPHOCYTES ABSOLUTE: 1.2 K/CU MM
LYMPHOCYTES RELATIVE PERCENT: 23.9 % (ref 24–44)
MAGNESIUM: 1.4 MG/DL (ref 1.8–2.4)
MCH RBC QN AUTO: 30.2 PG (ref 27–31)
MCHC RBC AUTO-ENTMCNC: 31.1 % (ref 32–36)
MCV RBC AUTO: 97.2 FL (ref 78–100)
MONOCYTES ABSOLUTE: 0.4 K/CU MM
MONOCYTES RELATIVE PERCENT: 7.4 % (ref 0–4)
NITRITE URINE, QUANTITATIVE: NEGATIVE
NUCLEATED RBC %: 0 %
PDW BLD-RTO: 13.8 % (ref 11.7–14.9)
PH, URINE: 5.5 (ref 5–8)
PLATELET # BLD: 161 K/CU MM (ref 140–440)
PMV BLD AUTO: 10.2 FL (ref 7.5–11.1)
POTASSIUM SERPL-SCNC: 3.9 MMOL/L (ref 3.5–5.1)
PRO-BNP: 443.2 PG/ML
PROTEIN UA: NEGATIVE MG/DL
RBC # BLD: 4.24 M/CU MM (ref 4.2–5.4)
SEGMENTED NEUTROPHILS ABSOLUTE COUNT: 3 K/CU MM
SEGMENTED NEUTROPHILS RELATIVE PERCENT: 62.4 % (ref 36–66)
SODIUM BLD-SCNC: 141 MMOL/L (ref 135–145)
SPECIFIC GRAVITY UA: 1.01 (ref 1–1.03)
TOTAL IMMATURE NEUTOROPHIL: 0.01 K/CU MM
TOTAL NUCLEATED RBC: 0 K/CU MM
TOTAL PROTEIN: 7 GM/DL (ref 6.4–8.2)
TROPONIN, HIGH SENSITIVITY: 16 NG/L (ref 0–14)
TROPONIN, HIGH SENSITIVITY: 16 NG/L (ref 0–14)
TROPONIN, HIGH SENSITIVITY: 17 NG/L (ref 0–14)
UROBILINOGEN, URINE: 1 MG/DL (ref 0.2–1)
WBC # BLD: 4.9 K/CU MM (ref 4–10.5)

## 2023-10-19 PROCEDURE — 93005 ELECTROCARDIOGRAM TRACING: CPT | Performed by: PHYSICIAN ASSISTANT

## 2023-10-19 PROCEDURE — 83880 ASSAY OF NATRIURETIC PEPTIDE: CPT

## 2023-10-19 PROCEDURE — 36415 COLL VENOUS BLD VENIPUNCTURE: CPT

## 2023-10-19 PROCEDURE — 83735 ASSAY OF MAGNESIUM: CPT

## 2023-10-19 PROCEDURE — 71045 X-RAY EXAM CHEST 1 VIEW: CPT

## 2023-10-19 PROCEDURE — 93010 ELECTROCARDIOGRAM REPORT: CPT | Performed by: INTERNAL MEDICINE

## 2023-10-19 PROCEDURE — 71275 CT ANGIOGRAPHY CHEST: CPT

## 2023-10-19 PROCEDURE — 96366 THER/PROPH/DIAG IV INF ADDON: CPT

## 2023-10-19 PROCEDURE — 6360000002 HC RX W HCPCS: Performed by: PHYSICIAN ASSISTANT

## 2023-10-19 PROCEDURE — 2580000003 HC RX 258: Performed by: STUDENT IN AN ORGANIZED HEALTH CARE EDUCATION/TRAINING PROGRAM

## 2023-10-19 PROCEDURE — 85025 COMPLETE CBC W/AUTO DIFF WBC: CPT

## 2023-10-19 PROCEDURE — 6370000000 HC RX 637 (ALT 250 FOR IP): Performed by: FAMILY MEDICINE

## 2023-10-19 PROCEDURE — 6370000000 HC RX 637 (ALT 250 FOR IP): Performed by: STUDENT IN AN ORGANIZED HEALTH CARE EDUCATION/TRAINING PROGRAM

## 2023-10-19 PROCEDURE — 6360000004 HC RX CONTRAST MEDICATION: Performed by: PHYSICIAN ASSISTANT

## 2023-10-19 PROCEDURE — 80053 COMPREHEN METABOLIC PANEL: CPT

## 2023-10-19 PROCEDURE — 84484 ASSAY OF TROPONIN QUANT: CPT

## 2023-10-19 PROCEDURE — G0378 HOSPITAL OBSERVATION PER HR: HCPCS

## 2023-10-19 PROCEDURE — 2580000003 HC RX 258: Performed by: PHYSICIAN ASSISTANT

## 2023-10-19 PROCEDURE — 96365 THER/PROPH/DIAG IV INF INIT: CPT

## 2023-10-19 PROCEDURE — 99285 EMERGENCY DEPT VISIT HI MDM: CPT

## 2023-10-19 PROCEDURE — 81003 URINALYSIS AUTO W/O SCOPE: CPT

## 2023-10-19 RX ORDER — HYDROCODONE BITARTRATE AND ACETAMINOPHEN 5; 325 MG/1; MG/1
1 TABLET ORAL EVERY 8 HOURS PRN
Status: DISCONTINUED | OUTPATIENT
Start: 2023-10-19 | End: 2023-10-20 | Stop reason: HOSPADM

## 2023-10-19 RX ORDER — SODIUM CHLORIDE 9 MG/ML
INJECTION, SOLUTION INTRAVENOUS PRN
Status: DISCONTINUED | OUTPATIENT
Start: 2023-10-19 | End: 2023-10-20 | Stop reason: HOSPADM

## 2023-10-19 RX ORDER — ONDANSETRON 2 MG/ML
4 INJECTION INTRAMUSCULAR; INTRAVENOUS EVERY 6 HOURS PRN
Status: DISCONTINUED | OUTPATIENT
Start: 2023-10-19 | End: 2023-10-20 | Stop reason: HOSPADM

## 2023-10-19 RX ORDER — ONDANSETRON 4 MG/1
4 TABLET, ORALLY DISINTEGRATING ORAL DAILY PRN
Status: DISCONTINUED | OUTPATIENT
Start: 2023-10-19 | End: 2023-10-19 | Stop reason: SDUPTHER

## 2023-10-19 RX ORDER — MAGNESIUM SULFATE IN WATER 40 MG/ML
2000 INJECTION, SOLUTION INTRAVENOUS ONCE
Status: COMPLETED | OUTPATIENT
Start: 2023-10-19 | End: 2023-10-19

## 2023-10-19 RX ORDER — ACETAMINOPHEN 650 MG/1
650 SUPPOSITORY RECTAL EVERY 6 HOURS PRN
Status: DISCONTINUED | OUTPATIENT
Start: 2023-10-19 | End: 2023-10-20 | Stop reason: HOSPADM

## 2023-10-19 RX ORDER — METOPROLOL TARTRATE 50 MG/1
50 TABLET, FILM COATED ORAL 2 TIMES DAILY
Status: DISCONTINUED | OUTPATIENT
Start: 2023-10-19 | End: 2023-10-20 | Stop reason: HOSPADM

## 2023-10-19 RX ORDER — POLYETHYLENE GLYCOL 3350 17 G/17G
17 POWDER, FOR SOLUTION ORAL DAILY PRN
Status: DISCONTINUED | OUTPATIENT
Start: 2023-10-19 | End: 2023-10-20 | Stop reason: HOSPADM

## 2023-10-19 RX ORDER — ONDANSETRON 4 MG/1
4 TABLET, ORALLY DISINTEGRATING ORAL EVERY 8 HOURS PRN
Status: DISCONTINUED | OUTPATIENT
Start: 2023-10-19 | End: 2023-10-20 | Stop reason: HOSPADM

## 2023-10-19 RX ORDER — SODIUM CHLORIDE 0.9 % (FLUSH) 0.9 %
5-40 SYRINGE (ML) INJECTION PRN
Status: DISCONTINUED | OUTPATIENT
Start: 2023-10-19 | End: 2023-10-20 | Stop reason: HOSPADM

## 2023-10-19 RX ORDER — SODIUM CHLORIDE 0.9 % (FLUSH) 0.9 %
5-40 SYRINGE (ML) INJECTION EVERY 12 HOURS SCHEDULED
Status: DISCONTINUED | OUTPATIENT
Start: 2023-10-19 | End: 2023-10-20 | Stop reason: HOSPADM

## 2023-10-19 RX ORDER — ACETAMINOPHEN 325 MG/1
650 TABLET ORAL EVERY 6 HOURS PRN
Status: DISCONTINUED | OUTPATIENT
Start: 2023-10-19 | End: 2023-10-20 | Stop reason: HOSPADM

## 2023-10-19 RX ORDER — ASPIRIN 81 MG/1
81 TABLET, CHEWABLE ORAL DAILY
Status: DISCONTINUED | OUTPATIENT
Start: 2023-10-19 | End: 2023-10-20 | Stop reason: HOSPADM

## 2023-10-19 RX ORDER — 0.9 % SODIUM CHLORIDE 0.9 %
500 INTRAVENOUS SOLUTION INTRAVENOUS ONCE
Status: COMPLETED | OUTPATIENT
Start: 2023-10-19 | End: 2023-10-19

## 2023-10-19 RX ORDER — ASPIRIN 325 MG
325 TABLET ORAL ONCE
Status: DISCONTINUED | OUTPATIENT
Start: 2023-10-19 | End: 2023-10-19

## 2023-10-19 RX ORDER — ALBUTEROL SULFATE 90 UG/1
2 AEROSOL, METERED RESPIRATORY (INHALATION) EVERY 6 HOURS PRN
Status: DISCONTINUED | OUTPATIENT
Start: 2023-10-19 | End: 2023-10-20 | Stop reason: HOSPADM

## 2023-10-19 RX ADMIN — SODIUM CHLORIDE 500 ML: 9 INJECTION, SOLUTION INTRAVENOUS at 13:10

## 2023-10-19 RX ADMIN — METOPROLOL TARTRATE 50 MG: 50 TABLET, FILM COATED ORAL at 20:57

## 2023-10-19 RX ADMIN — RIVAROXABAN 20 MG: 20 TABLET, FILM COATED ORAL at 20:57

## 2023-10-19 RX ADMIN — ASPIRIN 81 MG CHEWABLE TABLET 81 MG: 81 TABLET CHEWABLE at 20:57

## 2023-10-19 RX ADMIN — SODIUM CHLORIDE, PRESERVATIVE FREE 10 ML: 5 INJECTION INTRAVENOUS at 20:57

## 2023-10-19 RX ADMIN — IOPAMIDOL 75 ML: 755 INJECTION, SOLUTION INTRAVENOUS at 14:42

## 2023-10-19 RX ADMIN — MAGNESIUM SULFATE HEPTAHYDRATE 2000 MG: 40 INJECTION, SOLUTION INTRAVENOUS at 13:10

## 2023-10-19 RX ADMIN — HYDROCODONE BITARTRATE AND ACETAMINOPHEN 1 TABLET: 5; 325 TABLET ORAL at 20:57

## 2023-10-19 ASSESSMENT — PAIN DESCRIPTION - PAIN TYPE
TYPE: ACUTE PAIN;CHRONIC PAIN
TYPE: ACUTE PAIN;CHRONIC PAIN

## 2023-10-19 ASSESSMENT — PAIN DESCRIPTION - FREQUENCY: FREQUENCY: INTERMITTENT

## 2023-10-19 ASSESSMENT — PAIN DESCRIPTION - LOCATION
LOCATION: BACK;HEAD
LOCATION: BACK;HEAD

## 2023-10-19 ASSESSMENT — PAIN DESCRIPTION - ONSET: ONSET: GRADUAL

## 2023-10-19 ASSESSMENT — PAIN DESCRIPTION - ORIENTATION
ORIENTATION: RIGHT;LEFT;INNER;LOWER
ORIENTATION: RIGHT;LEFT;INNER;LOWER

## 2023-10-19 ASSESSMENT — PAIN SCALES - GENERAL
PAINLEVEL_OUTOF10: 9
PAINLEVEL_OUTOF10: 6

## 2023-10-19 ASSESSMENT — PAIN DESCRIPTION - DESCRIPTORS
DESCRIPTORS: ACHING;SHARP
DESCRIPTORS: ACHING;SHARP

## 2023-10-19 NOTE — ED NOTES
Medication History  [facility]    Patient Name: George Duke 1940     Medication history has been completed by: Birdie Levy CPhT    Source(s) of information: patient insurance claims and retail pharmacy     Primary Care Physician: Shagufta Aly, [address]: [place]    Allergies as of 10/19/2023 - Fully Reviewed 10/19/2023   Allergen Reaction Noted    Coumadin [warfarin sodium] Hives 06/15/2019    Ambien [zolpidem tartrate] Other (See Comments) 04/03/2012    Morphine sulfate Other (See Comments) 04/03/2012        Prior to Admission medications    Medication Sig Start Date End Date Taking? Authorizing Provider   ondansetron (ZOFRAN) 4 MG tablet Take 1 tablet by mouth daily as needed for Nausea or Vomiting 3/17/23   Kaitlin Lim MD   metoprolol tartrate (LOPRESSOR) 50 MG tablet Take 1 tablet by mouth 2 times daily 3/1/23   Regi Gillespie MD   rivaroxaban (XARELTO) 20 MG TABS tablet Take 1 tablet by mouth Daily with supper 11/28/22   Kaitlin Lim MD   HYDROcodone-acetaminophen (NORCO) 5-325 MG per tablet Take 1 tablet by mouth every 8 hours as needed.  10/10/22   Lulu Heaton MD   albuterol sulfate  (90 Base) MCG/ACT inhaler Inhale 2 puffs into the lungs every 6 hours as needed for Wheezing    Lulu Heaton MD   potassium chloride (KLOR-CON) 20 MEQ packet Take 10 mEq by mouth daily  10/19/23 last fill date 09/21/2020    Lulu Heaton MD   MAGNESIUM PO Take 200 mg by mouth nightly  10/19/23 last fill date 03/04/2020    Lulu Heaton MD   furosemide (LASIX) 20 MG tablet Take 1 tablet by mouth 2 times daily Will only take Lasix once daily if away from home  10/16/23 last fill date 06/05/2020    Lulu Heaton MD   aspirin 81 MG tablet Take 1 tablet by mouth daily    Lulu Heaton MD     Medications removed from list (include reason, ex. noncompliance, medication cost, therapy complete etc.): Ondansetron duplicate  Trazodone not taking    Comments:  Medication list reviewed with patient and verified last fill dates with retail pharmacy. Patient with active prescriptions for albuterol inhaler, metoprolol tartrate, norco and xarelto . Patient reports she takes her Xarelto with dinner last dose 10/18/23.     To my knowledge the above medication history is accurate as of 10/19/2023 3:10 PM.   Tana Taylor CPhT   10/19/2023 3:10 PM

## 2023-10-19 NOTE — H&P
Additional pack years: 0.00     Total pack years: 12.50     Types: Cigarettes     Quit date: 2022     Years since quittin.2    Smokeless tobacco: Never   Vaping Use    Vaping Use: Never used   Substance and Sexual Activity    Alcohol use: Not Currently     Alcohol/week: 2.0 standard drinks of alcohol     Types: 2 Glasses of wine per week     Comment: occ. caffeine: a cup coffee daily. sprite    Drug use: No    Sexual activity: Not Currently       Allergies: Allergies:    Allergies   Allergen Reactions    Coumadin [Warfarin Sodium] Hives     hives    Ambien [Zolpidem Tartrate] Other (See Comments)     hallucinations    Morphine Sulfate Other (See Comments)     agitation       Medications:   Medications:    aspirin  81 mg Oral Daily    metoprolol tartrate  50 mg Oral BID    rivaroxaban  20 mg Oral Dinner    sodium chloride flush  5-40 mL IntraVENous 2 times per day      Infusions:    sodium chloride       PRN Meds: albuterol sulfate HFA, 2 puff, Q6H PRN  sodium chloride flush, 5-40 mL, PRN  sodium chloride, , PRN  ondansetron, 4 mg, Q8H PRN   Or  ondansetron, 4 mg, Q6H PRN  polyethylene glycol, 17 g, Daily PRN  acetaminophen, 650 mg, Q6H PRN   Or  acetaminophen, 650 mg, Q6H PRN        Labs      CBC:   Recent Labs     10/19/23  1200   WBC 4.9   HGB 12.8        BMP:    Recent Labs     10/19/23  1200      K 3.9      CO2 27   BUN 10   CREATININE 0.7   GLUCOSE 121*     Hepatic:   Recent Labs     10/19/23  1200   AST 17   ALT 6*   BILITOT 0.9   ALKPHOS 97     Lipids:   Lab Results   Component Value Date/Time    CHOL 231 2012 10:20 AM    HDL 67 2012 10:20 AM    TRIG 96 2012 10:20 AM     Hemoglobin A1C:   Lab Results   Component Value Date/Time    LABA1C 5.8 2012 10:20 AM     TSH: No results found for: \"TSH\"  Troponin:   Lab Results   Component Value Date/Time    TROPONINT <0.010 2021 01:43 AM    TROPONINT <0.010 2021 08:16 AM    TROPONINT <0.010 2021 11:02 abnormality of the thoracic aorta. Status post FELICITY. Lungs/pleura: The lungs are without acute process. No focal consolidation or pulmonary edema. No evidence of pleural effusion or pneumothorax. Mild emphysema. Unchanged, 6 mm pulmonary nodule within the left lower lobe. Upper Abdomen:  Small hiatal hernia. Soft Tissues/Bones: No acute bone or soft tissue abnormality. No evidence of pulmonary embolism or acute pulmonary abnormality within limitations of motion artifact. Status post FELICITY. Unchanged, 6 mm pulmonary nodule within the left lower lobe. If patient is high risk for malignancy, recommend an additional non-contrast Chest CT at 18-24 months; if patient is low risk for malignancy a non-contrast Chest CT at 18-24 months is optional.     XR CHEST PORTABLE    Result Date: 10/19/2023  EXAMINATION: ONE XRAY VIEW OF THE CHEST 10/19/2023 12:30 pm COMPARISON: 02/01/2023 HISTORY: ORDERING SYSTEM PROVIDED HISTORY: Chest pain TECHNOLOGIST PROVIDED HISTORY: Reason for exam:->Chest pain Reason for Exam: Chest pain FINDINGS: The lungs are without acute focal process. There is no effusion or pneumothorax. The cardiomediastinal silhouette is without acute process. The osseous structures are without acute process. No acute process.        Electronically signed by Anaya Coyne MD on 10/19/2023 at 6:08 PM

## 2023-10-19 NOTE — ED NOTES
(*)     ALT 6 (*)     All other components within normal limits   BRAIN NATRIURETIC PEPTIDE - Abnormal; Notable for the following components:    Pro-.2 (*)     All other components within normal limits   MAGNESIUM - Abnormal; Notable for the following components:    Magnesium 1.4 (*)     All other components within normal limits   TROPONIN - Abnormal; Notable for the following components:    Troponin, High Sensitivity 17 (*)     All other components within normal limits   TROPONIN - Abnormal; Notable for the following components:    Troponin, High Sensitivity 16 (*)     All other components within normal limits       Background  History:   Past Medical History:   Diagnosis Date    Arthritis     DVT of deep femoral vein (HCC) 07/13/2016    H/O echocardiogram Limited 11/19/2019    MOD AS, JOLENE 1.3 sq    H/O right and left heart catheterization 11/15/2022    Moderate to severe aortic stenosis, normal coronaries. TAVR referral    History of echocardiogram 06/18/2019    EF >60% mild to moderate TR , MOD AS JOLENE 1.17 sq, Mod PHTN, Grade II DD. Hx of Doppler echocardiogram 09/30/2022    EF 55-60% Severe LV hypertrophy. Mildly dilated LA. Heavily calcified aortic valve with severe AS. Mitral annular calcification and the tips of the mitral valve leaflets are calcified. Mild MR, TR, and mod AR.     Hypertension     PAD (peripheral artery disease) ([facility])     Pneumonia     Seizures ([facility])     Vertigo        Assessment    Vitals/MEWS: MEWS Score: 1  Level of Consciousness: Alert (0)   Vitals:    10/19/23 1300 10/19/23 1330 10/19/23 1400 10/19/23 1615   BP: (!) 87/76 (!) 133/57 (!) 142/90 (!) 136/107   Pulse: 71 76 78 84   Resp: 15 16 20 14   Temp:       TempSrc:       SpO2:   99%    Weight:       Height:         PO Status: Regular  O2 Flow Rate: O2 Device: None (Room air)    Cardiac Rhythm:    Last documented pain medication administered:    NIH Score: NIH     Active LDA's:   Peripheral IV 10/19/23 Proximal;Right Forearm (Active)       Pertinent or High Risk Medications/Drips: no   If Yes, please provide details:    Blood Product Administration: no  If Yes, please provide details:      Recommendation    Incomplete orders    Additional Comments:     If any further questions, please call Sending RN at [phone]    Electronically signed by: Electronically signed by Edward Augustin RN on 10/19/2023 at 5:42 PM       Edward Augustin, [address] [place] [address]  10/19/23 4834

## 2023-10-19 NOTE — ED PROVIDER NOTES
EMERGENCY DEPARTMENT ENCOUNTER        Pt Name: Kristi Oh  MRN: [id number]  [address] [facility] 1940  Date of evaluation: 10/19/2023  Provider: MICHELLE Miranda  PCP: Meg MENDOZA. I have evaluated this patient. Triage CHIEF COMPLAINT       Chief Complaint   Patient presents with    Chest Pain     States was sent by PCP for a \"possible heart attack\", reports chest pain, sob, elevated labs. States symptoms have been going on for awhile. HISTORY OF PRESENT ILLNESS      Chief Complaint: Abnormal lab test, shortness of breath, left-sided chest wall pain    Kristi Oh is a 81 y.o.  female who presents to the ED via private vehicle with daughter at bedside after being notified by primary care that she had an abnormal cardiac lab test.  Patient is unsure of the test, states that she had a routine visit with her primary care doctor on Tuesday was notified this morning that she had an elevated blood test that was concerned that she \"may be having a heart attack\". Patient states that she has had ongoing shortness of breath, also been having left-sided chest wall pain, left breast pain with obtaining outpatient ultrasound. Does admit that this shortness of breath has been more progressive in nature. No obvious chest pain at rest.  She is not profoundly short of breath. She has had a complex cardiac history including history of a TAVR, she is on Xarelto has been taking this. Has had history of blood clots. Does admit that she gets short of breath on exertion, no significant fluid overload. Again this was more of her routine evaluation and she was advised to come to the emergency department today for an urgent evaluation. Nursing Notes were all reviewed and agreed with or any disagreements were addressed in the HPI. REVIEW OF SYSTEMS     At least 10 systems reviewed and otherwise acutely negative except as in the [address]      PAST MEDICAL HISTORY     Past Medical History: edema, asymmetry, or calf / thigh tenderness bilaterally. No cyanosis. No cool or pale-appearing limb. Distal cap refill and pulses intact bilateral upper and lower extremities  Bilateral upper and lower extremity ROM intact without pain or obvious deficit  Integument: No obvious abnormality on breast, no erythema, bruising, open sores or wounds, no signs of infection warm, Dry  Neurologic:  Alert & oriented , No focal deficits noted. Cranial nerves II through XII grossly intact. Normal gross motor coordination & motor strength bilateral upper and lower extremities  Sensation intact. Psychiatric:  Affect normal, Mood normal.     DIAGNOSTIC RESULTS   LABS:    Labs Reviewed   CBC WITH AUTO DIFFERENTIAL - Abnormal; Notable for the following components:       Result Value    MCHC 31.1 (*)     Lymphocytes % 23.9 (*)     Monocytes % 7.4 (*)     Eosinophils % 5.3 (*)     All other components within normal limits   COMPREHENSIVE METABOLIC PANEL - Abnormal; Notable for the following components:    Glucose 121 (*)     ALT 6 (*)     All other components within normal limits   BRAIN NATRIURETIC PEPTIDE - Abnormal; Notable for the following components:    Pro-.2 (*)     All other components within normal limits   MAGNESIUM - Abnormal; Notable for the following components:    Magnesium 1.4 (*)     All other components within normal limits   TROPONIN - Abnormal; Notable for the following components:    Troponin, High Sensitivity 17 (*)     All other components within normal limits   TROPONIN - Abnormal; Notable for the following components:    Troponin, High Sensitivity 16 (*)     All other components within normal limits   URINALYSIS       When ordered, only abnormal lab results are displayed. All other labs were within normal range or not returned as of this dictation. EKG:  When ordered, EKG's are interpreted by the Emergency Department Physician in the absence of a cardiologist.  Please see their note for

## 2023-10-20 VITALS
TEMPERATURE: 97.6 F | DIASTOLIC BLOOD PRESSURE: 56 MMHG | HEIGHT: 71 IN | RESPIRATION RATE: 17 BRPM | BODY MASS INDEX: 32.51 KG/M2 | WEIGHT: 232.2 LBS | SYSTOLIC BLOOD PRESSURE: 109 MMHG | HEART RATE: 74 BPM | OXYGEN SATURATION: 94 %

## 2023-10-20 PROBLEM — R06.09 DOE (DYSPNEA ON EXERTION): Status: ACTIVE | Noted: 2023-06-20

## 2023-10-20 LAB
ANION GAP SERPL CALCULATED.3IONS-SCNC: 13 MMOL/L (ref 4–16)
BASOPHILS ABSOLUTE: 0 K/CU MM
BASOPHILS RELATIVE PERCENT: 0.6 % (ref 0–1)
BUN SERPL-MCNC: 11 MG/DL (ref 6–23)
CALCIUM SERPL-MCNC: 8.7 MG/DL (ref 8.3–10.6)
CHLORIDE BLD-SCNC: 107 MMOL/L (ref 99–110)
CO2: 21 MMOL/L (ref 21–32)
CREAT SERPL-MCNC: 0.6 MG/DL (ref 0.6–1.1)
DIFFERENTIAL TYPE: ABNORMAL
EOSINOPHILS ABSOLUTE: 0.3 K/CU MM
EOSINOPHILS RELATIVE PERCENT: 5.8 % (ref 0–3)
GFR SERPL CREATININE-BSD FRML MDRD: >60 ML/MIN/1.73M2
GLUCOSE SERPL-MCNC: 98 MG/DL (ref 70–99)
HCT VFR BLD CALC: 38 % (ref 37–47)
HEMOGLOBIN: 11.8 GM/DL (ref 12.5–16)
IMMATURE NEUTROPHIL %: 0.2 % (ref 0–0.43)
LYMPHOCYTES ABSOLUTE: 1.4 K/CU MM
LYMPHOCYTES RELATIVE PERCENT: 29.7 % (ref 24–44)
MCH RBC QN AUTO: 30.6 PG (ref 27–31)
MCHC RBC AUTO-ENTMCNC: 31.1 % (ref 32–36)
MCV RBC AUTO: 98.7 FL (ref 78–100)
MONOCYTES ABSOLUTE: 0.6 K/CU MM
MONOCYTES RELATIVE PERCENT: 11.5 % (ref 0–4)
NUCLEATED RBC %: 0 %
PDW BLD-RTO: 14 % (ref 11.7–14.9)
PLATELET # BLD: 121 K/CU MM (ref 140–440)
PMV BLD AUTO: 9.8 FL (ref 7.5–11.1)
POTASSIUM SERPL-SCNC: 3.9 MMOL/L (ref 3.5–5.1)
RBC # BLD: 3.85 M/CU MM (ref 4.2–5.4)
SEGMENTED NEUTROPHILS ABSOLUTE COUNT: 2.5 K/CU MM
SEGMENTED NEUTROPHILS RELATIVE PERCENT: 52.2 % (ref 36–66)
SODIUM BLD-SCNC: 141 MMOL/L (ref 135–145)
TOTAL IMMATURE NEUTOROPHIL: 0.01 K/CU MM
TOTAL NUCLEATED RBC: 0 K/CU MM
TROPONIN, HIGH SENSITIVITY: 14 NG/L (ref 0–14)
TROPONIN, HIGH SENSITIVITY: 15 NG/L (ref 0–14)
WBC # BLD: 4.9 K/CU MM (ref 4–10.5)

## 2023-10-20 PROCEDURE — 6370000000 HC RX 637 (ALT 250 FOR IP): Performed by: STUDENT IN AN ORGANIZED HEALTH CARE EDUCATION/TRAINING PROGRAM

## 2023-10-20 PROCEDURE — 94640 AIRWAY INHALATION TREATMENT: CPT

## 2023-10-20 PROCEDURE — 94761 N-INVAS EAR/PLS OXIMETRY MLT: CPT

## 2023-10-20 PROCEDURE — 99223 1ST HOSP IP/OBS HIGH 75: CPT | Performed by: INTERNAL MEDICINE

## 2023-10-20 PROCEDURE — 93306 TTE W/DOPPLER COMPLETE: CPT

## 2023-10-20 PROCEDURE — 80048 BASIC METABOLIC PNL TOTAL CA: CPT

## 2023-10-20 PROCEDURE — 85025 COMPLETE CBC W/AUTO DIFF WBC: CPT

## 2023-10-20 PROCEDURE — 6370000000 HC RX 637 (ALT 250 FOR IP): Performed by: FAMILY MEDICINE

## 2023-10-20 PROCEDURE — G0378 HOSPITAL OBSERVATION PER HR: HCPCS

## 2023-10-20 PROCEDURE — 2580000003 HC RX 258: Performed by: STUDENT IN AN ORGANIZED HEALTH CARE EDUCATION/TRAINING PROGRAM

## 2023-10-20 PROCEDURE — 94664 DEMO&/EVAL PT USE INHALER: CPT

## 2023-10-20 PROCEDURE — 84484 ASSAY OF TROPONIN QUANT: CPT

## 2023-10-20 PROCEDURE — 36415 COLL VENOUS BLD VENIPUNCTURE: CPT

## 2023-10-20 RX ORDER — ATORVASTATIN CALCIUM 40 MG/1
40 TABLET, FILM COATED ORAL NIGHTLY
Status: DISCONTINUED | OUTPATIENT
Start: 2023-10-20 | End: 2023-10-20 | Stop reason: HOSPADM

## 2023-10-20 RX ORDER — MAGNESIUM 200 MG
400 TABLET ORAL NIGHTLY
Qty: 60 TABLET | Refills: 0
Start: 2023-10-20

## 2023-10-20 RX ORDER — IPRATROPIUM BROMIDE AND ALBUTEROL SULFATE 2.5; .5 MG/3ML; MG/3ML
1 SOLUTION RESPIRATORY (INHALATION)
Status: DISCONTINUED | OUTPATIENT
Start: 2023-10-20 | End: 2023-10-20 | Stop reason: HOSPADM

## 2023-10-20 RX ADMIN — IPRATROPIUM BROMIDE AND ALBUTEROL SULFATE 1 DOSE: 2.5; .5 SOLUTION RESPIRATORY (INHALATION) at 11:48

## 2023-10-20 RX ADMIN — ASPIRIN 81 MG CHEWABLE TABLET 81 MG: 81 TABLET CHEWABLE at 09:45

## 2023-10-20 RX ADMIN — METOPROLOL TARTRATE 50 MG: 50 TABLET, FILM COATED ORAL at 09:46

## 2023-10-20 RX ADMIN — IPRATROPIUM BROMIDE AND ALBUTEROL SULFATE 1 DOSE: 2.5; .5 SOLUTION RESPIRATORY (INHALATION) at 07:55

## 2023-10-20 RX ADMIN — IPRATROPIUM BROMIDE AND ALBUTEROL SULFATE 1 DOSE: 2.5; .5 SOLUTION RESPIRATORY (INHALATION) at 03:07

## 2023-10-20 RX ADMIN — SODIUM CHLORIDE, PRESERVATIVE FREE 10 ML: 5 INJECTION INTRAVENOUS at 09:46

## 2023-10-20 RX ADMIN — HYDROCODONE BITARTRATE AND ACETAMINOPHEN 1 TABLET: 5; 325 TABLET ORAL at 09:45

## 2023-10-20 ASSESSMENT — PAIN DESCRIPTION - ORIENTATION: ORIENTATION: RIGHT;LEFT

## 2023-10-20 ASSESSMENT — PAIN DESCRIPTION - LOCATION: LOCATION: BACK;LEG;NECK

## 2023-10-20 ASSESSMENT — PAIN - FUNCTIONAL ASSESSMENT: PAIN_FUNCTIONAL_ASSESSMENT: ACTIVITIES ARE NOT PREVENTED

## 2023-10-20 ASSESSMENT — PAIN SCALES - GENERAL: PAINLEVEL_OUTOF10: 8

## 2023-10-20 ASSESSMENT — PAIN DESCRIPTION - DESCRIPTORS: DESCRIPTORS: ACHING;DISCOMFORT;DULL

## 2023-10-20 NOTE — PROGRESS NOTES
4 Eyes Skin Assessment     NAME:  Negro Moody  YOB: 1940  MEDICAL RECORD NUMBER:  [id number]    The patient is being assessed for  Admission    I agree that at least one RN has performed a thorough Head to Toe Skin Assessment on the patient. ALL assessment sites listed below have been assessed. Areas assessed by both nurses:    Head, Face, Ears, Shoulders, Back, Chest, Arms, Elbows, Hands, Sacrum. Buttock, Coccyx, Ischium, Legs. Feet and Heels, and Under Medical Devices         Does the Patient have a Wound?  No noted wound(s)       Maximo Prevention initiated by RN: Yes  Wound Care Orders initiated by RN: No    Pressure Injury (Stage 3,4, Unstageable, DTI, NWPT, and Complex wounds) if present, place Wound referral order by RN under : No    New Ostomies, if present place, Ostomy referral order under : No     Nurse 1 eSignature: Electronically signed by Rola Case RN on 10/20/23 at 6:30 AM EDT    **SHARE this note so that the co-signing nurse can place an eSignature**    Nurse 2 eSignature: {Esignature:[id number]}

## 2023-10-20 NOTE — PROGRESS NOTES
Patient IV removed and given discharge instructions and referral names and numbers taken to ED exit with yaa Plascencia and daughter in a wheelchair.

## 2023-10-20 NOTE — DISCHARGE SUMMARY
control, iterative reconstruction, and/or weight based adjustment of the mA/kV was utilized to reduce the radiation dose to as low as reasonably achievable. COMPARISON: CTA chest 01/13/2023 HISTORY: ORDERING SYSTEM PROVIDED HISTORY: chest pain, sob, ro pe or acute thoracic path FINDINGS: Pulmonary Arteries: Pulmonary arteries are adequately opacified for evaluation. No evidence of intraluminal filling defect to suggest pulmonary embolism with limitations study due to motion artifact. Main pulmonary artery is normal in caliber. Mediastinum: No evidence of mediastinal lymphadenopathy. The heart and pericardium demonstrate no acute abnormality. There is no acute abnormality of the thoracic aorta. Status post FELICITY. Lungs/pleura: The lungs are without acute process. No focal consolidation or pulmonary edema. No evidence of pleural effusion or pneumothorax. Mild emphysema. Unchanged, 6 mm pulmonary nodule within the left lower lobe. Upper Abdomen:  Small hiatal hernia. Soft Tissues/Bones: No acute bone or soft tissue abnormality. No evidence of pulmonary embolism or acute pulmonary abnormality within limitations of motion artifact. Status post FELICITY. Unchanged, 6 mm pulmonary nodule within the left lower lobe. If patient is high risk for malignancy, recommend an additional non-contrast Chest CT at 18-24 months; if patient is low risk for malignancy a non-contrast Chest CT at 18-24 months is optional.     XR CHEST PORTABLE    Result Date: 10/19/2023  EXAMINATION: ONE XRAY VIEW OF THE CHEST 10/19/2023 12:30 pm COMPARISON: 02/01/2023 HISTORY: ORDERING SYSTEM PROVIDED HISTORY: Chest pain TECHNOLOGIST PROVIDED HISTORY: Reason for exam:->Chest pain Reason for Exam: Chest pain FINDINGS: The lungs are without acute focal process. There is no effusion or pneumothorax. The cardiomediastinal silhouette is without acute process. The osseous structures are without acute process. No acute process.        CBC:   Recent Labs     10/19/23  1200 10/20/23  0307   WBC 4.9 4.9   HGB 12.8 11.8*    121*     BMP:    Recent Labs     10/19/23  1200 10/20/23  0307    141   K 3.9 3.9    107   CO2 27 21   BUN 10 11   CREATININE 0.7 0.6   GLUCOSE 121* 98     Hepatic:   Recent Labs     10/19/23  1200   AST 17   ALT 6*   BILITOT 0.9   ALKPHOS 97     Lipids:   Lab Results   Component Value Date/Time    CHOL 231 03/09/2012 10:20 AM    HDL 67 03/09/2012 10:20 AM    TRIG 96 03/09/2012 10:20 AM     Hemoglobin A1C:   Lab Results   Component Value Date/Time    LABA1C 5.8 03/09/2012 10:20 AM     TSH: No results found for: \"TSH\"  Troponin:   Lab Results   Component Value Date/Time    TROPONINT <0.010 12/17/2021 01:43 AM    TROPONINT <0.010 07/14/2021 08:16 AM    TROPONINT <0.010 07/13/2021 11:02 PM     Lactic Acid: No results for input(s): \"LACTA\" in the last 72 hours.   BNP:   Recent Labs     10/19/23  1200   PROBNP 443.2*     UA:  Lab Results   Component Value Date/Time    NITRU NEGATIVE 10/19/2023 04:41 PM    COLORU YELLOW 10/19/2023 04:41 PM    WBCUA 1 03/09/2012 10:08 AM    RBCUA <1 03/09/2012 10:08 AM    MUCUS RARE 03/09/2012 10:08 AM    BACTERIA OCCASIONAL 03/09/2012 10:08 AM    CLARITYU CLEAR 10/19/2023 04:41 PM    SPECGRAV 1.010 10/19/2023 04:41 PM    LEUKOCYTESUR NEGATIVE 10/19/2023 04:41 PM    UROBILINOGEN 1.0 10/19/2023 04:41 PM    BILIRUBINUR NEGATIVE 10/19/2023 04:41 PM    BLOODU NEGATIVE 10/19/2023 04:41 PM    KETUA NEGATIVE 10/19/2023 04:41 PM     Urine Cultures: No results found for: \"LABURIN\"  Blood Cultures: No results found for: \"BC\"  No results found for: \"BLOODCULT2\"  Organism:   Lab Results   Component Value Date/Time    [facility] SV[facility] 04/03/2012 03:19 PM    [facility] 04/03/2012 03:19 PM       Time Spent Discharging patient 35 minutes    Electronically signed by Heavenly Tavarez PA-C on 10/20/2023 at 1:36 PM

## 2023-10-20 NOTE — DISCHARGE INSTRUCTIONS
Arrange to be evaluated by a pulmonologist. Your primary care provider can assist with this. Your magnesium level was low. Increase your magnesium supplement to 2 pills nightly (400mg) and have this rechecked next week by your PCP. For your chest pain, you can use over the counter analgesics like Tylenol. Avoid NSAIDs (ibuprofen/aleve/Naproxen) while on blood thinner.  You can also try lidocaine cream.

## 2023-11-07 ENCOUNTER — OFFICE VISIT (OUTPATIENT)
Dept: CARDIOLOGY CLINIC | Age: 83
End: 2023-11-07
Payer: COMMERCIAL

## 2023-11-07 ENCOUNTER — HOSPITAL ENCOUNTER (OUTPATIENT)
Age: 83
Discharge: HOME | End: 2023-11-07
Payer: COMMERCIAL

## 2023-11-07 ENCOUNTER — HOSPITAL ENCOUNTER (OUTPATIENT)
Dept: GENERAL RADIOLOGY | Age: 83
Discharge: HOME | End: 2023-11-07
Payer: COMMERCIAL

## 2023-11-07 VITALS
BODY MASS INDEX: 30.35 KG/M2 | WEIGHT: 216.8 LBS | HEART RATE: 84 BPM | OXYGEN SATURATION: 97 % | SYSTOLIC BLOOD PRESSURE: 124 MMHG | HEIGHT: 71 IN | DIASTOLIC BLOOD PRESSURE: 76 MMHG

## 2023-11-07 DIAGNOSIS — I10 PRIMARY HYPERTENSION: ICD-10-CM

## 2023-11-07 DIAGNOSIS — M25.551 RIGHT HIP PAIN: ICD-10-CM

## 2023-11-07 DIAGNOSIS — R07.1 CHEST PAIN ON BREATHING: ICD-10-CM

## 2023-11-07 DIAGNOSIS — Z86.718 H/O DEEP VENOUS THROMBOSIS: ICD-10-CM

## 2023-11-07 DIAGNOSIS — Z86.711 HISTORY OF PULMONARY ARTERY THROMBOSIS: ICD-10-CM

## 2023-11-07 DIAGNOSIS — R06.09 DOE (DYSPNEA ON EXERTION): ICD-10-CM

## 2023-11-07 DIAGNOSIS — I38 VHD (VALVULAR HEART DISEASE): Primary | ICD-10-CM

## 2023-11-07 PROCEDURE — 3078F DIAST BP <80 MM HG: CPT | Performed by: NURSE PRACTITIONER

## 2023-11-07 PROCEDURE — 1090F PRES/ABSN URINE INCON ASSESS: CPT | Performed by: NURSE PRACTITIONER

## 2023-11-07 PROCEDURE — 1036F TOBACCO NON-USER: CPT | Performed by: NURSE PRACTITIONER

## 2023-11-07 PROCEDURE — G8428 CUR MEDS NOT DOCUMENT: HCPCS | Performed by: NURSE PRACTITIONER

## 2023-11-07 PROCEDURE — G8417 CALC BMI ABV UP PARAM F/U: HCPCS | Performed by: NURSE PRACTITIONER

## 2023-11-07 PROCEDURE — 3074F SYST BP LT 130 MM HG: CPT | Performed by: NURSE PRACTITIONER

## 2023-11-07 PROCEDURE — G8400 PT W/DXA NO RESULTS DOC: HCPCS | Performed by: NURSE PRACTITIONER

## 2023-11-07 PROCEDURE — 99214 OFFICE O/P EST MOD 30 MIN: CPT | Performed by: NURSE PRACTITIONER

## 2023-11-07 PROCEDURE — 73502 X-RAY EXAM HIP UNI 2-3 VIEWS: CPT

## 2023-11-07 PROCEDURE — G8484 FLU IMMUNIZE NO ADMIN: HCPCS | Performed by: NURSE PRACTITIONER

## 2023-11-07 PROCEDURE — 1124F ACP DISCUSS-NO DSCNMKR DOCD: CPT | Performed by: NURSE PRACTITIONER

## 2023-11-07 RX ORDER — ONDANSETRON 4 MG/1
4 TABLET, FILM COATED ORAL DAILY PRN
Qty: 30 TABLET | Refills: 4 | Status: SHIPPED | OUTPATIENT
Start: 2023-11-07

## 2023-11-07 RX ORDER — MAGNESIUM 200 MG
400 TABLET ORAL NIGHTLY
Qty: 60 TABLET | Refills: 3 | Status: SHIPPED | OUTPATIENT
Start: 2023-11-07

## 2023-11-07 RX ORDER — FUROSEMIDE 20 MG/1
20 TABLET ORAL DAILY
Qty: 30 TABLET | Refills: 3 | Status: SHIPPED | OUTPATIENT
Start: 2023-11-07

## 2023-11-07 RX ORDER — POTASSIUM CHLORIDE 1.5 G/1.58G
10 POWDER, FOR SOLUTION ORAL DAILY
Qty: 30 EACH | Refills: 3 | Status: SHIPPED | OUTPATIENT
Start: 2023-11-07 | End: 2023-11-08

## 2023-11-07 RX ORDER — METOPROLOL TARTRATE 50 MG/1
50 TABLET, FILM COATED ORAL 2 TIMES DAILY
Qty: 90 TABLET | Refills: 4 | Status: SHIPPED | OUTPATIENT
Start: 2023-11-07

## 2023-11-07 ASSESSMENT — ENCOUNTER SYMPTOMS
SHORTNESS OF BREATH: 1
BACK PAIN: 1

## 2023-11-07 NOTE — PATIENT INSTRUCTIONS
**It is YOUR responsibilty to bring medication bottles and/or updated medication list to [facility]. This will allow us to better serve you and all your healthcare needs**  Thank you for allowing us to care for you today! We want to ensure we can follow your treatment plan and we strive to give you the best outcomes and experience possible. If you ever have a life threatening emergency and call 911 - for an ambulance (EMS)   Our providers can only care for you at:   [facility] or [facility]. Even if you have someone take you or you drive yourself we can only care for you in a [facility] facility. Our providers are not setup at the other healthcare locations! [facility] Laboratory Locations - No appointment necessary. Sites open Monday to Friday. Call your preferred location for test preparation, business   hours and other information you need. [employer] accepts 's. [address] [place] [address]. [address] [place] [address]. [facility], [address] [facility]  Phone: [phone]     We are committed to providing you the best care possible. If you receive a survey after visiting one of our offices, please take time to share your experience concerning your physician office visit. These surveys are confidential and no health information about you is shared. We are eager to improve for you and we are counting on your feedback to help make that happen.

## 2023-11-07 NOTE — PROGRESS NOTES
[facility] [address], [address] [facility] [address]  Phone: [phone]    Fax [phone]                  Hailee Calles MD, Cherise Pisano MD, Vincenzo Alejandro MD, MD Lucy Menendez MD, Swathi Randolph MD, Erik Schroeder MD, [address] [place] [address] Simón, ISHA Cat, APRN  Jose Gutierrez, ISHA South, APRN        Cardiology Progress Note      11/7/2023    RE: Saurabh Frye  (6/6/1970)                             Primary cardiologist: Dr. Lucy Victoria       Subjective:  CC:   1. VHD (valvular heart disease)    2. Primary hypertension    3. H/O deep venous thrombosis    4. History of pulmonary artery thrombosis    5. SALCEDO (dyspnea on exertion)    6. Chest pain on breathing        HPI: Negro Garcia, who is a  81y.o. year old female with a past medical history as listed below. Patient presents to the office for follow up on H/O DVT and PE, VHD, HTN, and SOB. Patient had TAVR procedure 2/1/2023. She has a history of COPD but does not follow with pulmonology. Patient was recently admitted to the hospital for evaluation of ongoing chest pain x2 days. Her chest pain was worse on inspiration, CTA of chest negative for PE. Slight elevation in troponin, BP hypertensive at that time. Patient had normal left heart cath and February 2023 did not show any CAD. Echocardiogram while hospitalized showed stable aortic valve replacement. Patient denies any chest pain,  dizziness, syncope, or palpitations. Past Medical History:   Diagnosis Date    Arthritis     COPD (chronic obstructive pulmonary disease) ([address])     DVT of deep femoral vein ([address]) 07/13/2016    H/O echocardiogram Limited 11/19/2019    MOD AS, JOLENE 1.3 sq    H/O right and left heart catheterization 11/15/2022    Moderate to severe aortic stenosis, normal coronaries.  TAVR referral    History of echocardiogram 06/18/2019    EF >60% mild to moderate TR , MOD

## 2023-11-08 ENCOUNTER — TELEPHONE (OUTPATIENT)
Dept: CARDIOLOGY CLINIC | Age: 83
End: 2023-11-08

## 2023-11-08 RX ORDER — POTASSIUM CHLORIDE 750 MG/1
10 CAPSULE, EXTENDED RELEASE ORAL DAILY
COMMUNITY

## 2024-02-06 ENCOUNTER — HOSPITAL ENCOUNTER (OUTPATIENT)
Age: 84
Discharge: HOME | End: 2024-02-08
Payer: COMMERCIAL

## 2024-02-06 ENCOUNTER — HOSPITAL ENCOUNTER (OUTPATIENT)
Dept: NON INVASIVE DIAGNOSTICS | Age: 84
Discharge: HOME | End: 2024-02-08
Payer: COMMERCIAL

## 2024-02-06 VITALS
HEIGHT: 71 IN | OXYGEN SATURATION: 94 % | HEART RATE: 73 BPM | RESPIRATION RATE: 14 BRPM | SYSTOLIC BLOOD PRESSURE: 149 MMHG | WEIGHT: 209 LBS | DIASTOLIC BLOOD PRESSURE: 91 MMHG | BODY MASS INDEX: 29.26 KG/M2

## 2024-02-06 DIAGNOSIS — Z95.2 HISTORY OF TRANSCATHETER AORTIC VALVE REPLACEMENT (TAVR): ICD-10-CM

## 2024-02-06 DIAGNOSIS — Z95.2 HEART VALVE REPLACED: Primary | ICD-10-CM

## 2024-02-06 LAB
ALBUMIN SERPL-MCNC: 4.3 GM/DL (ref 3.4–5)
ALP BLD-CCNC: 76 IU/L (ref 40–129)
ALT SERPL-CCNC: 10 U/L (ref 10–40)
ANION GAP SERPL CALCULATED.3IONS-SCNC: 11 MMOL/L (ref 7–16)
APTT: 27.5 SECONDS (ref 25.1–37.1)
AST SERPL-CCNC: 23 IU/L (ref 15–37)
BILIRUB SERPL-MCNC: 0.6 MG/DL (ref 0–1)
BUN SERPL-MCNC: 9 MG/DL (ref 6–23)
CALCIUM SERPL-MCNC: 9.2 MG/DL (ref 8.3–10.6)
CHLORIDE BLD-SCNC: 104 MMOL/L (ref 99–110)
CO2: 25 MMOL/L (ref 21–32)
CREAT SERPL-MCNC: 0.5 MG/DL (ref 0.6–1.1)
GFR SERPL CREATININE-BSD FRML MDRD: >60 ML/MIN/1.73M2
GLUCOSE SERPL-MCNC: 88 MG/DL (ref 70–99)
HCT VFR BLD CALC: 44.5 % (ref 37–47)
HEMOGLOBIN: 13.9 GM/DL (ref 12.5–16)
INR BLD: 1 INDEX
MCH RBC QN AUTO: 30.7 PG (ref 27–31)
MCHC RBC AUTO-ENTMCNC: 31.2 % (ref 32–36)
MCV RBC AUTO: 98.2 FL (ref 78–100)
PDW BLD-RTO: 12.9 % (ref 11.7–14.9)
PLATELET # BLD: 168 K/CU MM (ref 140–440)
PMV BLD AUTO: 10.8 FL (ref 7.5–11.1)
POTASSIUM SERPL-SCNC: 4.5 MMOL/L (ref 3.5–5.1)
PRO-BNP: 654.2 PG/ML
PROTHROMBIN TIME: 12.9 SECONDS (ref 11.7–14.5)
RBC # BLD: 4.53 M/CU MM (ref 4.2–5.4)
SODIUM BLD-SCNC: 140 MMOL/L (ref 135–145)
TOTAL PROTEIN: 7.1 GM/DL (ref 6.4–8.2)
WBC # BLD: 4.3 K/CU MM (ref 4–10.5)

## 2024-02-06 PROCEDURE — 85730 THROMBOPLASTIN TIME PARTIAL: CPT

## 2024-02-06 PROCEDURE — 80053 COMPREHEN METABOLIC PANEL: CPT

## 2024-02-06 PROCEDURE — 99024 POSTOP FOLLOW-UP VISIT: CPT

## 2024-02-06 PROCEDURE — 85610 PROTHROMBIN TIME: CPT

## 2024-02-06 PROCEDURE — 85027 COMPLETE CBC AUTOMATED: CPT

## 2024-02-06 PROCEDURE — 83880 ASSAY OF NATRIURETIC PEPTIDE: CPT

## 2024-02-06 PROCEDURE — 36415 COLL VENOUS BLD VENIPUNCTURE: CPT

## 2024-02-06 PROCEDURE — 93005 ELECTROCARDIOGRAM TRACING: CPT | Performed by: INTERNAL MEDICINE

## 2024-02-06 NOTE — PLAN OF CARE
Pt arrives to valve clinic for 1 year post tAVR visit.   Pt complains of left breast pain, states it has been ongoing for several months.   After looking in epic, Dr. Moore appears to have ordered diagnostic testing, but pt states after placing a call, she has not heard back from Imaging center.  I contacted imaging center, and they stated they contacted Dr. Moore, and got no response. Dr. Moore office at [facility], [phone]. I was connected with Marce, the person in charge of referrals. Marce stated she will contact Imaging Center

## 2024-02-07 ENCOUNTER — HOSPITAL ENCOUNTER (OUTPATIENT)
Dept: NON INVASIVE DIAGNOSTICS | Age: 84
Discharge: HOME | End: 2024-02-09
Attending: INTERNAL MEDICINE
Payer: COMMERCIAL

## 2024-02-07 LAB
EKG ATRIAL RATE: 68 BPM
EKG DIAGNOSIS: NORMAL
EKG P-R INTERVAL: 188 MS
EKG Q-T INTERVAL: 470 MS
EKG QRS DURATION: 134 MS
EKG QTC CALCULATION (BAZETT): 499 MS
EKG R AXIS: 179 DEGREES
EKG T AXIS: 160 DEGREES
EKG VENTRICULAR RATE: 68 BPM

## 2024-02-07 PROCEDURE — 93306 TTE W/DOPPLER COMPLETE: CPT

## 2024-02-07 PROCEDURE — 93010 ELECTROCARDIOGRAM REPORT: CPT | Performed by: INTERNAL MEDICINE

## 2024-02-08 ENCOUNTER — TELEPHONE (OUTPATIENT)
Dept: PULMONOLOGY | Age: 84
End: 2024-02-08

## 2024-02-08 ENCOUNTER — OFFICE VISIT (OUTPATIENT)
Dept: CARDIOLOGY CLINIC | Age: 84
End: 2024-02-08
Payer: COMMERCIAL

## 2024-02-08 VITALS
WEIGHT: 220 LBS | HEART RATE: 73 BPM | HEIGHT: 71 IN | SYSTOLIC BLOOD PRESSURE: 132 MMHG | OXYGEN SATURATION: 95 % | DIASTOLIC BLOOD PRESSURE: 78 MMHG | BODY MASS INDEX: 30.8 KG/M2

## 2024-02-08 DIAGNOSIS — Z86.718 H/O DEEP VENOUS THROMBOSIS: ICD-10-CM

## 2024-02-08 DIAGNOSIS — R06.02 SOB (SHORTNESS OF BREATH): ICD-10-CM

## 2024-02-08 DIAGNOSIS — I10 PRIMARY HYPERTENSION: ICD-10-CM

## 2024-02-08 DIAGNOSIS — Z86.711 HISTORY OF PULMONARY ARTERY THROMBOSIS: ICD-10-CM

## 2024-02-08 DIAGNOSIS — I38 VHD (VALVULAR HEART DISEASE): Primary | ICD-10-CM

## 2024-02-08 DIAGNOSIS — R07.1 CHEST PAIN ON BREATHING: ICD-10-CM

## 2024-02-08 PROBLEM — I35.0 AORTIC STENOSIS, SEVERE: Status: RESOLVED | Noted: 2023-02-02 | Resolved: 2024-02-08

## 2024-02-08 PROBLEM — I26.99 PULMONARY EMBOLISM AND INFARCTION (HCC): Status: RESOLVED | Noted: 2019-06-15 | Resolved: 2024-02-08

## 2024-02-08 PROCEDURE — 3078F DIAST BP <80 MM HG: CPT | Performed by: NURSE PRACTITIONER

## 2024-02-08 PROCEDURE — 1090F PRES/ABSN URINE INCON ASSESS: CPT | Performed by: NURSE PRACTITIONER

## 2024-02-08 PROCEDURE — 1124F ACP DISCUSS-NO DSCNMKR DOCD: CPT | Performed by: NURSE PRACTITIONER

## 2024-02-08 PROCEDURE — G8484 FLU IMMUNIZE NO ADMIN: HCPCS | Performed by: NURSE PRACTITIONER

## 2024-02-08 PROCEDURE — 1036F TOBACCO NON-USER: CPT | Performed by: NURSE PRACTITIONER

## 2024-02-08 PROCEDURE — 3075F SYST BP GE 130 - 139MM HG: CPT | Performed by: NURSE PRACTITIONER

## 2024-02-08 PROCEDURE — G8427 DOCREV CUR MEDS BY ELIG CLIN: HCPCS | Performed by: NURSE PRACTITIONER

## 2024-02-08 PROCEDURE — 99214 OFFICE O/P EST MOD 30 MIN: CPT | Performed by: NURSE PRACTITIONER

## 2024-02-08 PROCEDURE — G8400 PT W/DXA NO RESULTS DOC: HCPCS | Performed by: NURSE PRACTITIONER

## 2024-02-08 PROCEDURE — G8417 CALC BMI ABV UP PARAM F/U: HCPCS | Performed by: NURSE PRACTITIONER

## 2024-02-08 ASSESSMENT — ENCOUNTER SYMPTOMS
SHORTNESS OF BREATH: 1
BACK PAIN: 1

## 2024-02-08 NOTE — PATIENT INSTRUCTIONS
Please be informed that if you contact our office outside of normal business hours the physician on call cannot help with any scheduling or rescheduling issues, procedure instruction questions or any type of medication issue.    We advise you for any urgent/emergency that you go to the nearest emergency room!    PLEASE CALL OUR OFFICE DURING NORMAL BUSINESS HOURS    Monday - Friday   8 am to 5 pm    [place]: [phone]    [place]: [phone]    [place]:  [phone]  **It is YOUR responsibilty to bring medication bottles and/or updated medication list to EACH APPOINTMENT. This will allow us to better serve you and all your healthcare needs**  Thank you for allowing us to care for you today!   We want to ensure we can follow your treatment plan and we strive to give you the best outcomes and experience possible.   If you ever have a life threatening emergency and call 911 - for an ambulance (EMS)   Our providers can only care for you at:   [facility] or [facility].   Even if you have someone take you or you drive yourself we can only care for you in a [facility] facility. Our providers are not setup at the other healthcare locations!   We are committed to providing you the best care possible.    If you receive a survey after visiting one of our offices, please take time to share your experience concerning your physician office visit.  These surveys are confidential and no health information about you is shared.    We are eager to improve for you and we are counting on your feedback to help make that happen.

## 2024-02-08 NOTE — TELEPHONE ENCOUNTER
First attempt to reach patient regarding referral.  \"Customer not available - try later\"  no contact with patient to make appt.

## 2024-02-08 NOTE — PROGRESS NOTES
2/6/24, shows stable valve.  Patient does have a history of COPD but has not seen pulmonology, recommend to F/U.   Patient appears euvolemic at this time continue with Lasix 20 mg daily.    VHD (valvular heart disease)   S/P TAVR 2/1/23. Recent echo shows stable valve.     Primary hypertension   - Stable, continue with metoprolol 50 mg twice daily and Lasix 20 mg daily.      Patient seen, interviewed and examined. Testing was reviewed.      Patient is encouraged to exercise even a brisk walk for 30 minutes at least 3 to 4 times a week.  Lifestyle and risk factor modificatons discussed. Various goals are discussed and questions answered. Continue current medications. Appropriate prescriptions are addressed.  Questions answered and patient verbalizes understanding.     Call for any problems, questions, or concerns.    Pt is to follow up in 3 months for Cardiac management    Electronically signed by ISHA Quintero CNP on 2/8/2024 at 11:46 AM

## 2024-03-04 RX ORDER — FUROSEMIDE 20 MG/1
20 TABLET ORAL DAILY
Qty: 30 TABLET | Refills: 5 | Status: SHIPPED | OUTPATIENT
Start: 2024-03-04

## 2024-03-04 RX ORDER — MAGNESIUM OXIDE 400 MG/1
400 TABLET ORAL DAILY
Qty: 30 TABLET | Refills: 5 | Status: SHIPPED | OUTPATIENT
Start: 2024-03-04

## 2024-03-04 RX ORDER — POTASSIUM CHLORIDE 750 MG/1
10 TABLET, EXTENDED RELEASE ORAL DAILY
Qty: 30 TABLET | Refills: 5 | Status: SHIPPED | OUTPATIENT
Start: 2024-03-04

## 2024-03-12 ENCOUNTER — OFFICE VISIT (OUTPATIENT)
Dept: PULMONOLOGY | Age: 84
End: 2024-03-12
Payer: COMMERCIAL

## 2024-03-12 VITALS
SYSTOLIC BLOOD PRESSURE: 128 MMHG | DIASTOLIC BLOOD PRESSURE: 78 MMHG | BODY MASS INDEX: 30.68 KG/M2 | HEART RATE: 93 BPM | OXYGEN SATURATION: 97 % | HEIGHT: 71 IN

## 2024-03-12 DIAGNOSIS — J44.9 CHRONIC OBSTRUCTIVE PULMONARY DISEASE, UNSPECIFIED COPD TYPE (HCC): Primary | ICD-10-CM

## 2024-03-12 DIAGNOSIS — R09.02 HYPOXEMIA: ICD-10-CM

## 2024-03-12 DIAGNOSIS — R06.02 SOB (SHORTNESS OF BREATH): ICD-10-CM

## 2024-03-12 PROCEDURE — 1036F TOBACCO NON-USER: CPT | Performed by: NURSE PRACTITIONER

## 2024-03-12 PROCEDURE — G8417 CALC BMI ABV UP PARAM F/U: HCPCS | Performed by: NURSE PRACTITIONER

## 2024-03-12 PROCEDURE — G8400 PT W/DXA NO RESULTS DOC: HCPCS | Performed by: NURSE PRACTITIONER

## 2024-03-12 PROCEDURE — 3074F SYST BP LT 130 MM HG: CPT | Performed by: NURSE PRACTITIONER

## 2024-03-12 PROCEDURE — 99204 OFFICE O/P NEW MOD 45 MIN: CPT | Performed by: NURSE PRACTITIONER

## 2024-03-12 PROCEDURE — 3078F DIAST BP <80 MM HG: CPT | Performed by: NURSE PRACTITIONER

## 2024-03-12 PROCEDURE — 1124F ACP DISCUSS-NO DSCNMKR DOCD: CPT | Performed by: NURSE PRACTITIONER

## 2024-03-12 PROCEDURE — 3023F SPIROM DOC REV: CPT | Performed by: NURSE PRACTITIONER

## 2024-03-12 PROCEDURE — 1090F PRES/ABSN URINE INCON ASSESS: CPT | Performed by: NURSE PRACTITIONER

## 2024-03-12 PROCEDURE — G8484 FLU IMMUNIZE NO ADMIN: HCPCS | Performed by: NURSE PRACTITIONER

## 2024-03-12 PROCEDURE — G8427 DOCREV CUR MEDS BY ELIG CLIN: HCPCS | Performed by: NURSE PRACTITIONER

## 2024-03-12 RX ORDER — ALBUTEROL SULFATE 2.5 MG/3ML
2.5 SOLUTION RESPIRATORY (INHALATION) 4 TIMES DAILY PRN
Qty: 120 EACH | Refills: 3 | Status: SHIPPED | OUTPATIENT
Start: 2024-03-12

## 2024-03-12 NOTE — PATIENT INSTRUCTIONS
Pulmonary function study  6 minute walk test to oxygen need  Albuterol nebulizer medication     Nebulizer from [facility].

## 2024-03-12 NOTE — PROGRESS NOTES
Negro Moody (:  1940) is a 83 y.o. female,New patient, here for evaluation of the following chief complaint(s):  Shortness of Breath        Subjective   SUBJECTIVE/OBJECTIVE:  Negro Moody 82 yo female was referred [facility] for dyspnea on exertion when taking a shower. She states the she is using oxygen as needed and especially at night. She is accompanied by her niece, Sharron. She endorses being a former smoker. States that she quit smoking when had a heart valve replaced in 2013.     States that she never smoked much. Had the main aorta replaced said that the other valve may need to be replaced soon.     She is requesting albuterol neb solution because she is not able to generate enough breath to use inhaler effectively. Negro is in need of a nebulizer to do albuterol breathing treatments for diagnosis of COPD. She is also complaining of chest pain when breathing in. Denies falling. States she hurts all over. RA, OA , deterioration of the spine.     Denies ever being infected with Covid-19. She is has received 2 injection of the vaccine     She received he last flu vaccine in  and the pneumonia vaccine in . She is not up to date on either vaccines.   She has not received RSV vaccine. RSV   State that she had shingles 2 years ago, but has not received the vaccine.    Chest xray appears looks cloudy. No acute processes found.     Discussed doing a CT wo contrast of lungs due to 6 mm left upper lung nodular found on CTA.     Shortness of Breath        Review of Systems   Constitutional:  Positive for fatigue.   Respiratory:  Positive for shortness of breath.    Neurological:  Positive for weakness.        Uses wheelchair and walker to assist in mobility.    Psychiatric/Behavioral:  Positive for confusion and sleep disturbance. The patient is nervous/anxious.    All other systems reviewed and are negative.         Objective   Physical Exam  Constitutional:       General: She is

## 2024-03-18 PROBLEM — R09.02 HYPOXEMIA: Status: ACTIVE | Noted: 2024-03-18

## 2024-03-18 PROBLEM — J44.9 CHRONIC OBSTRUCTIVE PULMONARY DISEASE (HCC): Status: ACTIVE | Noted: 2024-03-18

## 2024-04-16 ENCOUNTER — HOSPITAL ENCOUNTER (OUTPATIENT)
Dept: WOMENS IMAGING | Age: 84
Discharge: HOME | End: 2024-04-16
Payer: COMMERCIAL

## 2024-04-16 ENCOUNTER — HOSPITAL ENCOUNTER (OUTPATIENT)
Dept: ULTRASOUND IMAGING | Age: 84
Discharge: HOME | End: 2024-04-16
Payer: COMMERCIAL

## 2024-04-16 DIAGNOSIS — N64.4 BREAST PAIN, LEFT: ICD-10-CM

## 2024-04-16 PROCEDURE — G0279 TOMOSYNTHESIS, MAMMO: HCPCS

## 2024-08-13 ENCOUNTER — HOSPITAL ENCOUNTER (OUTPATIENT)
Dept: PULMONOLOGY | Age: 84
Discharge: HOME | End: 2024-08-13
Payer: COMMERCIAL

## 2024-08-13 DIAGNOSIS — J44.9 CHRONIC OBSTRUCTIVE PULMONARY DISEASE, UNSPECIFIED COPD TYPE (HCC): ICD-10-CM

## 2024-08-13 LAB
DISTANCE WALKED: NORMAL FT
DLCO %PRED: NORMAL
DLCO PRED: NORMAL
DLCO/VA %PRED: NORMAL
DLCO/VA PRED: NORMAL
DLCO/VA: NORMAL
DLCO: NORMAL
EXPIRATORY TIME-POST: NORMAL
EXPIRATORY TIME: NORMAL
FEF 25-75 %CHNG: NORMAL
FEF 25-75 POST %PRED: NORMAL
FEF 25-75% %PRED-PRE: 0 L/SEC
FEF 25-75% PRED: NORMAL
FEF 25-75-POST: NORMAL
FEF 25-75-PRE: NORMAL
FEV1 %PRED-POST: NORMAL
FEV1 %PRED-PRE: NORMAL
FEV1 PRED: NORMAL
FEV1-POST: NORMAL
FEV1-PRE: NORMAL
FEV1/FVC %PRED-POST: NORMAL
FEV1/FVC %PRED-PRE: NORMAL
FEV1/FVC PRED: NORMAL
FEV1/FVC-POST: NORMAL
FEV1/FVC-PRE: NORMAL
FVC %PRED-POST: 0 L
FVC %PRED-PRE: 0 %
FVC PRED: NORMAL
FVC-POST: NORMAL
FVC-PRE: NORMAL
GAW %PRED: NORMAL
GAW PRED: NORMAL
GAW: NORMAL
IC PRE %PRED: NORMAL
IC PRED: NORMAL
IC: NORMAL
MEP: NORMAL
MIP: NORMAL
MVV %PRED-PRE: NORMAL
MVV PRED: NORMAL
MVV-PRE: NORMAL
PEF %PRED-POST: NORMAL
PEF %PRED-PRE: NORMAL
PEF PRED: NORMAL
PEF%CHNG: NORMAL
PEF-POST: NORMAL
PEF-PRE: NORMAL
RAW %PRED: NORMAL
RAW PRED: NORMAL
RAW: NORMAL
RV PRE %PRED: NORMAL
RV PRED: NORMAL
RV: NORMAL
SPO2: 98 %
SVC %PRED: NORMAL
SVC PRED: NORMAL
SVC: NORMAL
TLC PRE %PRED: NORMAL
TLC PRED: NORMAL
TLC: NORMAL
VA %PRED: NORMAL
VA PRED: NORMAL
VA: NORMAL
VTG %PRED: NORMAL
VTG PRED: NORMAL
VTG: NORMAL

## 2024-08-13 PROCEDURE — 94010 BREATHING CAPACITY TEST: CPT

## 2024-08-13 PROCEDURE — 94618 PULMONARY STRESS TESTING: CPT

## 2024-08-13 ASSESSMENT — PULMONARY FUNCTION TESTS
FVC_PERCENT_PREDICTED_POST: 0
FVC_PERCENT_PREDICTED_PRE: 0

## 2024-08-13 NOTE — PROGRESS NOTES
[facility] Pulmonary Function Lab - Six Minute Walk  Test Performed on: Room Air_X_ Oxygen at _____ lpm via N/C  Assist Device Used During Test:    None____ Cane____ Walker__X_   Shortness of Breath - Jaylen's Scale  0 Nothing at all 5 Severe    0.5 Very very slight 6    1 Very slight 7 Very severe   2 Slight 8     3 Moderate 9 Very very severe   4 Somewhat severe 10 Maximal      Time SpO2 Heart Rate Respiratory Rate Modified Jaylen’s Scale Other      Baseline     98       %     79    20 PRE    0            1 minute                 97       %     101       3 ELBOW PAIN    BACK PAIN      2 minutes            100      %     101       4    LEG PAIN       3 minutes          PT    STOP CAN'T WALK    ANYMORE      1 LAP       4 minutes                      %                           5 minutes                  %                           6 minutes                     %                       Recovery   x 1 Min                97     %     85      32 POST             Recovery   x 2 Min                 97      %      83                    Number of Laps__1_ X 170 feet _170_+ _10_ additional feet = Total _180__feet  Stopped or paused before 6 minutes? No_X___ Yes ____   Pre Blood Pressure:  129_ / _58__    Post Blood Pressure:_141 _/_66__  Interpretation:

## 2024-08-20 ENCOUNTER — HOSPITAL ENCOUNTER (OUTPATIENT)
Dept: GENERAL RADIOLOGY | Age: 84
Discharge: HOME | End: 2024-08-20
Payer: COMMERCIAL

## 2024-08-20 ENCOUNTER — OFFICE VISIT (OUTPATIENT)
Dept: PULMONOLOGY | Age: 84
End: 2024-08-20
Payer: COMMERCIAL

## 2024-08-20 ENCOUNTER — HOSPITAL ENCOUNTER (OUTPATIENT)
Age: 84
Discharge: HOME | End: 2024-08-20
Payer: COMMERCIAL

## 2024-08-20 VITALS
SYSTOLIC BLOOD PRESSURE: 136 MMHG | OXYGEN SATURATION: 92 % | HEIGHT: 71 IN | BODY MASS INDEX: 30.46 KG/M2 | DIASTOLIC BLOOD PRESSURE: 88 MMHG | HEART RATE: 52 BPM | WEIGHT: 217.6 LBS

## 2024-08-20 DIAGNOSIS — G47.34 NOCTURNAL OXYGEN DESATURATION: Primary | ICD-10-CM

## 2024-08-20 DIAGNOSIS — R06.02 SOB (SHORTNESS OF BREATH): ICD-10-CM

## 2024-08-20 PROCEDURE — 1090F PRES/ABSN URINE INCON ASSESS: CPT | Performed by: NURSE PRACTITIONER

## 2024-08-20 PROCEDURE — 1036F TOBACCO NON-USER: CPT | Performed by: NURSE PRACTITIONER

## 2024-08-20 PROCEDURE — 1124F ACP DISCUSS-NO DSCNMKR DOCD: CPT | Performed by: NURSE PRACTITIONER

## 2024-08-20 PROCEDURE — 3075F SYST BP GE 130 - 139MM HG: CPT | Performed by: NURSE PRACTITIONER

## 2024-08-20 PROCEDURE — 99214 OFFICE O/P EST MOD 30 MIN: CPT | Performed by: NURSE PRACTITIONER

## 2024-08-20 PROCEDURE — 71046 X-RAY EXAM CHEST 2 VIEWS: CPT

## 2024-08-20 PROCEDURE — G8417 CALC BMI ABV UP PARAM F/U: HCPCS | Performed by: NURSE PRACTITIONER

## 2024-08-20 PROCEDURE — G8400 PT W/DXA NO RESULTS DOC: HCPCS | Performed by: NURSE PRACTITIONER

## 2024-08-20 PROCEDURE — 3079F DIAST BP 80-89 MM HG: CPT | Performed by: NURSE PRACTITIONER

## 2024-08-20 PROCEDURE — G8427 DOCREV CUR MEDS BY ELIG CLIN: HCPCS | Performed by: NURSE PRACTITIONER

## 2024-08-20 RX ORDER — ONDANSETRON 4 MG/1
4 TABLET, FILM COATED ORAL DAILY PRN
Qty: 30 TABLET | Refills: 5 | Status: SHIPPED | OUTPATIENT
Start: 2024-08-20

## 2024-08-20 ASSESSMENT — ENCOUNTER SYMPTOMS
SHORTNESS OF BREATH: 1
WHEEZING: 1

## 2024-08-20 NOTE — PROGRESS NOTES
Negro Moody (:  1940) is a 83 y.o. female,Established patient, here for evaluation of the following chief complaint(s):  Follow-up (6MWT/PFT Results )      Subjective   SUBJECTIVE/OBJECTIVE:  Negro Moody 82 yo female has returned to discuss results of her PFT and 6 minute walk testing in which she had difficulty completing. Her chief compliant is sob with activity and decrease in ability to perform ADLs.  She is complaining of pain under her left breast and SOB accompanying it. She is agreeable to an overnight pulse ox test. She is requesting to contact her daughter, Sharron @ [phone] with results. She states agreement with an ABG at next visit to evaluate need for daytime oxygen. Breath sounds are decreased throughout.       Review of Systems   Constitutional:  Positive for fatigue.   Respiratory:  Positive for shortness of breath and wheezing.    Neurological:  Positive for weakness.   Psychiatric/Behavioral:  Positive for sleep disturbance.         SOB at night. Feeling not getting enough oxygen into her lungs.    All other systems reviewed and are negative.     Objective   Physical Exam  Vitals and nursing note reviewed. Exam conducted with a chaperone present (daughter , Sharron).   Constitutional:       General: She is awake.      Appearance: Normal appearance. She is well-developed and well-groomed. She is obese.   HENT:      Mouth/Throat:      Mouth: Mucous membranes are moist.      Pharynx: Oropharynx is clear.   Eyes:      Extraocular Movements: Extraocular movements intact.      Conjunctiva/sclera: Conjunctivae normal.      Pupils: Pupils are equal, round, and reactive to light.   Cardiovascular:      Rate and Rhythm: Normal rate and regular rhythm.      Pulses: Normal pulses.      Heart sounds: Normal heart sounds.   Pulmonary:      Comments: Labored at rest and on exertion.   Musculoskeletal:         General: Normal range of motion.      Cervical back: Normal range of motion and neck  Patient is a 39yo  admitted on 20 @29w1d for PPROM. PNC c/b asthma, herpetic clarisse (started on valtrex suppression while admitted) and gHTN. Hospital course... Patient is a 41yo  admitted on 20 @29w1d for PPROM. PNC c/b asthma, herpetic clarisse (started on valtrex suppression and topical steroids while admitted) and gHTN. Hospital course... Patient is a 39yo  admitted on 20 @29w1d for PPROM. PNC c/b asthma, herpetic clarisse (started on valtrex suppression and topical steroids while admitted) and gHTN. Pt found to have a non reassuring fetal heart tracing and underwent repeat C section at 33 weeks. Post operative course was uncomplicated. Pt discharged on POD 2 meeting all milestones Patient is a 39yo  admitted on 20 @29w1d for PPROM. PNC c/b asthma, herpetic clarisse (started on valtrex suppression and topical steroids while admitted) and gHTN. Pt found to have a non reassuring fetal heart tracing and underwent repeat C section at 33 weeks, . Post operative course was uncomplicated. Pt discharged on POD 4 meeting all milestones.  Postpartum birth control plan is BTL at time of delivery.

## 2024-09-05 DIAGNOSIS — J44.9 CHRONIC OBSTRUCTIVE PULMONARY DISEASE, UNSPECIFIED COPD TYPE (HCC): ICD-10-CM

## 2024-09-05 RX ORDER — ALBUTEROL SULFATE 0.83 MG/ML
2.5 SOLUTION RESPIRATORY (INHALATION) 4 TIMES DAILY PRN
Qty: 120 EACH | Refills: 3 | OUTPATIENT
Start: 2024-09-05

## 2024-09-10 RX ORDER — ALBUTEROL SULFATE 90 UG/1
2 AEROSOL, METERED RESPIRATORY (INHALATION) EVERY 6 HOURS PRN
Qty: 1 EACH | Refills: 0 | Status: SHIPPED | OUTPATIENT
Start: 2024-09-10

## 2024-09-12 ENCOUNTER — TELEPHONE (OUTPATIENT)
Dept: CARDIOLOGY CLINIC | Age: 84
End: 2024-09-12

## 2024-09-12 PROBLEM — I82.A12 DVT OF AXILLARY VEIN, ACUTE LEFT (HCC): Status: RESOLVED | Noted: 2021-12-17 | Resolved: 2024-09-12

## 2024-09-12 PROBLEM — O22.30 DVT (DEEP VEIN THROMBOSIS) IN PREGNANCY: Status: RESOLVED | Noted: 2021-07-05 | Resolved: 2024-09-12

## 2024-09-25 ENCOUNTER — TELEPHONE (OUTPATIENT)
Dept: CARDIOLOGY CLINIC | Age: 84
End: 2024-09-25

## 2024-10-08 ENCOUNTER — TELEPHONE (OUTPATIENT)
Dept: PULMONOLOGY | Age: 84
End: 2024-10-08

## 2024-10-10 ENCOUNTER — OFFICE VISIT (OUTPATIENT)
Dept: CARDIOLOGY CLINIC | Age: 84
End: 2024-10-10
Payer: COMMERCIAL

## 2024-10-10 VITALS
OXYGEN SATURATION: 96 % | HEIGHT: 71 IN | SYSTOLIC BLOOD PRESSURE: 110 MMHG | BODY MASS INDEX: 30.52 KG/M2 | WEIGHT: 218 LBS | DIASTOLIC BLOOD PRESSURE: 72 MMHG | HEART RATE: 56 BPM

## 2024-10-10 DIAGNOSIS — I38 VHD (VALVULAR HEART DISEASE): ICD-10-CM

## 2024-10-10 DIAGNOSIS — R06.02 SOB (SHORTNESS OF BREATH): Primary | ICD-10-CM

## 2024-10-10 DIAGNOSIS — R07.1 CHEST PAIN ON BREATHING: ICD-10-CM

## 2024-10-10 DIAGNOSIS — Z86.718 H/O DEEP VENOUS THROMBOSIS: ICD-10-CM

## 2024-10-10 DIAGNOSIS — Z86.711 HISTORY OF PULMONARY ARTERY THROMBOSIS: ICD-10-CM

## 2024-10-10 PROCEDURE — G8400 PT W/DXA NO RESULTS DOC: HCPCS | Performed by: NURSE PRACTITIONER

## 2024-10-10 PROCEDURE — G8484 FLU IMMUNIZE NO ADMIN: HCPCS | Performed by: NURSE PRACTITIONER

## 2024-10-10 PROCEDURE — 1124F ACP DISCUSS-NO DSCNMKR DOCD: CPT | Performed by: NURSE PRACTITIONER

## 2024-10-10 PROCEDURE — 3074F SYST BP LT 130 MM HG: CPT | Performed by: NURSE PRACTITIONER

## 2024-10-10 PROCEDURE — 99214 OFFICE O/P EST MOD 30 MIN: CPT | Performed by: NURSE PRACTITIONER

## 2024-10-10 PROCEDURE — 1036F TOBACCO NON-USER: CPT | Performed by: NURSE PRACTITIONER

## 2024-10-10 PROCEDURE — G8427 DOCREV CUR MEDS BY ELIG CLIN: HCPCS | Performed by: NURSE PRACTITIONER

## 2024-10-10 PROCEDURE — 3078F DIAST BP <80 MM HG: CPT | Performed by: NURSE PRACTITIONER

## 2024-10-10 PROCEDURE — 1090F PRES/ABSN URINE INCON ASSESS: CPT | Performed by: NURSE PRACTITIONER

## 2024-10-10 PROCEDURE — G8417 CALC BMI ABV UP PARAM F/U: HCPCS | Performed by: NURSE PRACTITIONER

## 2024-10-10 RX ORDER — METOPROLOL TARTRATE 50 MG
50 TABLET ORAL 2 TIMES DAILY
Qty: 90 TABLET | Refills: 4 | Status: CANCELLED | OUTPATIENT
Start: 2024-10-10

## 2024-10-10 ASSESSMENT — ENCOUNTER SYMPTOMS
BACK PAIN: 1
SHORTNESS OF BREATH: 1

## 2024-10-10 NOTE — PROGRESS NOTES
valve; peak P mmHg, mean P mmHg, JOLENE: 2.2 cm sq. Trace perivalvular leak in the 4 o'clock position.   Mitral annular calcification is present.   No evidence of any pericardial effusion.    Cath:11/15/22  LMCA: Normal.     LAD: Normal.     LCx: Normal.     RCA: Normal.aberrant origin    Echo:10/19/23  Left ventricular systolic function is normal.   Ejection fraction is visually estimated at 55-60%.   Grade I diastolic dysfunction.   Moderate left ventricular hypertrophy.   S/p TAVR 34 mm Medtronic Evolut PRO aortic valve; peak P mmHg, mean PG:   15 mmHg, JOLENE: 2.8 cm sq.   No evidence of any pericardial effusion.    Echo:24  Left Ventricle: Normal left ventricular systolic function with a visually estimated EF of 55 - 60%. Left ventricle size is normal. Normal wall thickness. Normal wall motion. Abnormal diastolic function.    Aortic Valve: Medtronic Evolut PRO appropriately positioned transcatheter bioprosthetic valve with a size of 34 mm.    Left Atrium: Left atrium is mildly dilated.    Image quality is good.      The ASCVD Risk score (Nancy DK, et al., 2019) failed to calculate for the following reasons:    The 2019 ASCVD risk score is only valid for ages 40 to 79      Assessment/ Plan:     SOB (shortness of breath)   - Ongoing shortness of breath.  Reports symptoms have not improved since TAVR procedure.  Follow-up echocardiogram 24, shows stable valve.  Patient has COPD and F/U with pulmonology. Recent CXR showed chronic interstitial disease. Patient appears euvolemic at this time continue with Lasix 20 mg daily.  Patient reports eating saltine crackers and pickle juice, reviewed low sodium diet with patient.     VHD (valvular heart disease)   S/P TAVR 23. Recent echo shows stable valve. Currently bradycardiac and has been out of Lopressor for 5 days. B/P controlled. Stop lopressor.    Chest pain  -Noncardiac chest pain.  Patient has known aberrant RCA on [facility] in .  Chest pain

## 2024-10-11 DIAGNOSIS — J44.9 CHRONIC OBSTRUCTIVE PULMONARY DISEASE, UNSPECIFIED COPD TYPE (HCC): ICD-10-CM

## 2024-10-14 RX ORDER — ALBUTEROL SULFATE 0.83 MG/ML
2.5 SOLUTION RESPIRATORY (INHALATION) 4 TIMES DAILY PRN
Qty: 120 EACH | Refills: 3 | Status: SHIPPED | OUTPATIENT
Start: 2024-10-14

## 2024-11-19 ENCOUNTER — TELEPHONE (OUTPATIENT)
Dept: GASTROENTEROLOGY | Age: 84
End: 2024-11-19

## 2024-11-19 NOTE — TELEPHONE ENCOUNTER
Called pt. In regards to a referral for abd pain. Pt stated she hasn't had pain in a few days so she will call back to make an appt if it returns.

## 2025-03-18 ENCOUNTER — HOSPITAL ENCOUNTER (INPATIENT)
Age: 85
LOS: 2 days | DRG: 291 | End: 2025-03-21
Attending: EMERGENCY MEDICINE | Admitting: INTERNAL MEDICINE
Payer: COMMERCIAL

## 2025-03-18 ENCOUNTER — APPOINTMENT (OUTPATIENT)
Dept: ULTRASOUND IMAGING | Age: 85
DRG: 291 | End: 2025-03-18
Payer: COMMERCIAL

## 2025-03-18 ENCOUNTER — APPOINTMENT (OUTPATIENT)
Dept: CT IMAGING | Age: 85
DRG: 291 | End: 2025-03-18
Payer: COMMERCIAL

## 2025-03-18 DIAGNOSIS — R79.89 ELEVATED TROPONIN: ICD-10-CM

## 2025-03-18 DIAGNOSIS — R60.0 PERIPHERAL EDEMA: ICD-10-CM

## 2025-03-18 DIAGNOSIS — M79.604 BILATERAL LEG PAIN: ICD-10-CM

## 2025-03-18 DIAGNOSIS — R79.89 ELEVATED BRAIN NATRIURETIC PEPTIDE (BNP) LEVEL: ICD-10-CM

## 2025-03-18 DIAGNOSIS — R94.31 ABNORMAL EKG: ICD-10-CM

## 2025-03-18 DIAGNOSIS — M79.605 BILATERAL LEG PAIN: ICD-10-CM

## 2025-03-18 DIAGNOSIS — R10.84 GENERALIZED ABDOMINAL PAIN: Primary | ICD-10-CM

## 2025-03-18 DIAGNOSIS — R07.2 PRECORDIAL PAIN: ICD-10-CM

## 2025-03-18 DIAGNOSIS — I20.9 ANGINA PECTORIS: ICD-10-CM

## 2025-03-18 LAB
ALBUMIN SERPL-MCNC: 3.3 G/DL (ref 3.4–5)
ALBUMIN/GLOB SERPL: 1.2 {RATIO} (ref 1.1–2.2)
ALP SERPL-CCNC: 75 U/L (ref 40–129)
ALT SERPL-CCNC: 7 U/L (ref 10–40)
ANION GAP SERPL CALCULATED.3IONS-SCNC: 12 MMOL/L (ref 9–17)
AST SERPL-CCNC: 26 U/L (ref 15–37)
BASOPHILS # BLD: 0.04 K/UL
BASOPHILS NFR BLD: 1 % (ref 0–1)
BILIRUB SERPL-MCNC: 1 MG/DL (ref 0–1)
BNP SERPL-MCNC: 5855 PG/ML (ref 0–450)
BUN SERPL-MCNC: 13 MG/DL (ref 7–20)
CALCIUM SERPL-MCNC: 8.8 MG/DL (ref 8.3–10.6)
CHLORIDE SERPL-SCNC: 104 MMOL/L (ref 99–110)
CO2 SERPL-SCNC: 27 MMOL/L (ref 21–32)
CREAT SERPL-MCNC: 0.8 MG/DL (ref 0.6–1.2)
EOSINOPHIL # BLD: 0.24 K/UL
EOSINOPHILS RELATIVE PERCENT: 5 % (ref 0–3)
ERYTHROCYTE [DISTWIDTH] IN BLOOD BY AUTOMATED COUNT: 15.1 % (ref 11.7–14.9)
FOLATE SERPL-MCNC: 12.2 NG/ML (ref 4.8–24.2)
GFR, ESTIMATED: 70 ML/MIN/1.73M2
GLUCOSE SERPL-MCNC: 92 MG/DL (ref 74–99)
HCT VFR BLD AUTO: 40.5 % (ref 37–47)
HGB BLD-MCNC: 12 G/DL (ref 12.5–16)
IMM GRANULOCYTES # BLD AUTO: 0.01 K/UL
IMM GRANULOCYTES NFR BLD: 0 %
LIPASE SERPL-CCNC: 11 U/L (ref 13–60)
LYMPHOCYTES NFR BLD: 0.97 K/UL
LYMPHOCYTES RELATIVE PERCENT: 18 % (ref 24–44)
MCH RBC QN AUTO: 29.4 PG (ref 27–31)
MCHC RBC AUTO-ENTMCNC: 29.6 G/DL (ref 32–36)
MCV RBC AUTO: 99.3 FL (ref 78–100)
MONOCYTES NFR BLD: 0.55 K/UL
MONOCYTES NFR BLD: 10 % (ref 0–4)
NEUTROPHILS NFR BLD: 66 % (ref 36–66)
NEUTS SEG NFR BLD: 3.51 K/UL
PLATELET # BLD AUTO: 143 K/UL (ref 140–440)
PMV BLD AUTO: 10.1 FL (ref 7.5–11.1)
POTASSIUM SERPL-SCNC: 3.9 MMOL/L (ref 3.5–5.1)
PROT SERPL-MCNC: 6.1 G/DL (ref 6.4–8.2)
RBC # BLD AUTO: 4.08 M/UL (ref 4.2–5.4)
SODIUM SERPL-SCNC: 143 MMOL/L (ref 136–145)
TROPONIN I SERPL HS-MCNC: 25 NG/L (ref 0–14)
TROPONIN I SERPL HS-MCNC: 26 NG/L (ref 0–14)
VIT B12 SERPL-MCNC: 618 PG/ML (ref 211–911)
WBC OTHER # BLD: 5.3 K/UL (ref 4–10.5)

## 2025-03-18 PROCEDURE — 2500000003 HC RX 250 WO HCPCS: Performed by: STUDENT IN AN ORGANIZED HEALTH CARE EDUCATION/TRAINING PROGRAM

## 2025-03-18 PROCEDURE — 93005 ELECTROCARDIOGRAM TRACING: CPT

## 2025-03-18 PROCEDURE — 80053 COMPREHEN METABOLIC PANEL: CPT

## 2025-03-18 PROCEDURE — 74174 CTA ABD&PLVS W/CONTRAST: CPT

## 2025-03-18 PROCEDURE — 99285 EMERGENCY DEPT VISIT HI MDM: CPT

## 2025-03-18 PROCEDURE — 94761 N-INVAS EAR/PLS OXIMETRY MLT: CPT

## 2025-03-18 PROCEDURE — G0378 HOSPITAL OBSERVATION PER HR: HCPCS

## 2025-03-18 PROCEDURE — 82746 ASSAY OF FOLIC ACID SERUM: CPT

## 2025-03-18 PROCEDURE — 84484 ASSAY OF TROPONIN QUANT: CPT

## 2025-03-18 PROCEDURE — 6360000004 HC RX CONTRAST MEDICATION: Performed by: EMERGENCY MEDICINE

## 2025-03-18 PROCEDURE — 83880 ASSAY OF NATRIURETIC PEPTIDE: CPT

## 2025-03-18 PROCEDURE — 83690 ASSAY OF LIPASE: CPT

## 2025-03-18 PROCEDURE — 85025 COMPLETE CBC W/AUTO DIFF WBC: CPT

## 2025-03-18 PROCEDURE — 93005 ELECTROCARDIOGRAM TRACING: CPT | Performed by: EMERGENCY MEDICINE

## 2025-03-18 PROCEDURE — 93970 EXTREMITY STUDY: CPT

## 2025-03-18 PROCEDURE — 6370000000 HC RX 637 (ALT 250 FOR IP): Performed by: EMERGENCY MEDICINE

## 2025-03-18 PROCEDURE — 82607 VITAMIN B-12: CPT

## 2025-03-18 PROCEDURE — 6370000000 HC RX 637 (ALT 250 FOR IP): Performed by: STUDENT IN AN ORGANIZED HEALTH CARE EDUCATION/TRAINING PROGRAM

## 2025-03-18 RX ORDER — FUROSEMIDE 20 MG/1
20 TABLET ORAL DAILY
Status: DISCONTINUED | OUTPATIENT
Start: 2025-03-18 | End: 2025-03-19

## 2025-03-18 RX ORDER — ONDANSETRON 2 MG/ML
4 INJECTION INTRAMUSCULAR; INTRAVENOUS EVERY 6 HOURS PRN
Status: DISCONTINUED | OUTPATIENT
Start: 2025-03-18 | End: 2025-03-18

## 2025-03-18 RX ORDER — HYDROCODONE BITARTRATE AND ACETAMINOPHEN 5; 325 MG/1; MG/1
1 TABLET ORAL ONCE
Status: COMPLETED | OUTPATIENT
Start: 2025-03-18 | End: 2025-03-18

## 2025-03-18 RX ORDER — ONDANSETRON 4 MG/1
4 TABLET, FILM COATED ORAL DAILY PRN
Status: DISCONTINUED | OUTPATIENT
Start: 2025-03-18 | End: 2025-03-18

## 2025-03-18 RX ORDER — MAGNESIUM SULFATE IN WATER 40 MG/ML
2000 INJECTION, SOLUTION INTRAVENOUS PRN
Status: DISCONTINUED | OUTPATIENT
Start: 2025-03-18 | End: 2025-03-21 | Stop reason: HOSPADM

## 2025-03-18 RX ORDER — KETOROLAC TROMETHAMINE 30 MG/ML
15 INJECTION, SOLUTION INTRAMUSCULAR; INTRAVENOUS ONCE
Status: COMPLETED | OUTPATIENT
Start: 2025-03-18 | End: 2025-03-19

## 2025-03-18 RX ORDER — ACETAMINOPHEN 325 MG/1
650 TABLET ORAL EVERY 6 HOURS PRN
Status: DISCONTINUED | OUTPATIENT
Start: 2025-03-18 | End: 2025-03-21 | Stop reason: HOSPADM

## 2025-03-18 RX ORDER — SODIUM CHLORIDE 9 MG/ML
INJECTION, SOLUTION INTRAVENOUS PRN
Status: DISCONTINUED | OUTPATIENT
Start: 2025-03-18 | End: 2025-03-21 | Stop reason: HOSPADM

## 2025-03-18 RX ORDER — ACETAMINOPHEN 650 MG/1
650 SUPPOSITORY RECTAL EVERY 6 HOURS PRN
Status: DISCONTINUED | OUTPATIENT
Start: 2025-03-18 | End: 2025-03-21 | Stop reason: HOSPADM

## 2025-03-18 RX ORDER — POLYETHYLENE GLYCOL 3350 17 G/17G
17 POWDER, FOR SOLUTION ORAL DAILY PRN
Status: DISCONTINUED | OUTPATIENT
Start: 2025-03-18 | End: 2025-03-21 | Stop reason: HOSPADM

## 2025-03-18 RX ORDER — LANOLIN ALCOHOL/MO/W.PET/CERES
400 CREAM (GRAM) TOPICAL DAILY
Status: DISCONTINUED | OUTPATIENT
Start: 2025-03-18 | End: 2025-03-21 | Stop reason: HOSPADM

## 2025-03-18 RX ORDER — ALBUTEROL SULFATE 0.83 MG/ML
2.5 SOLUTION RESPIRATORY (INHALATION) 4 TIMES DAILY PRN
Status: DISCONTINUED | OUTPATIENT
Start: 2025-03-18 | End: 2025-03-21 | Stop reason: HOSPADM

## 2025-03-18 RX ORDER — ONDANSETRON 4 MG/1
4 TABLET, ORALLY DISINTEGRATING ORAL EVERY 8 HOURS PRN
Status: DISCONTINUED | OUTPATIENT
Start: 2025-03-18 | End: 2025-03-18

## 2025-03-18 RX ORDER — POTASSIUM CHLORIDE 7.45 MG/ML
10 INJECTION INTRAVENOUS PRN
Status: DISCONTINUED | OUTPATIENT
Start: 2025-03-18 | End: 2025-03-21 | Stop reason: HOSPADM

## 2025-03-18 RX ORDER — ASPIRIN 81 MG/1
81 TABLET, CHEWABLE ORAL DAILY
Status: DISCONTINUED | OUTPATIENT
Start: 2025-03-18 | End: 2025-03-21 | Stop reason: HOSPADM

## 2025-03-18 RX ORDER — SODIUM CHLORIDE 0.9 % (FLUSH) 0.9 %
5-40 SYRINGE (ML) INJECTION EVERY 12 HOURS SCHEDULED
Status: DISCONTINUED | OUTPATIENT
Start: 2025-03-18 | End: 2025-03-21 | Stop reason: HOSPADM

## 2025-03-18 RX ORDER — ENOXAPARIN SODIUM 100 MG/ML
40 INJECTION SUBCUTANEOUS DAILY
Status: DISCONTINUED | OUTPATIENT
Start: 2025-03-18 | End: 2025-03-18

## 2025-03-18 RX ORDER — SODIUM CHLORIDE 0.9 % (FLUSH) 0.9 %
5-40 SYRINGE (ML) INJECTION PRN
Status: DISCONTINUED | OUTPATIENT
Start: 2025-03-18 | End: 2025-03-21 | Stop reason: HOSPADM

## 2025-03-18 RX ORDER — POTASSIUM CHLORIDE 1500 MG/1
40 TABLET, EXTENDED RELEASE ORAL PRN
Status: DISCONTINUED | OUTPATIENT
Start: 2025-03-18 | End: 2025-03-21 | Stop reason: HOSPADM

## 2025-03-18 RX ORDER — ALBUTEROL SULFATE 90 UG/1
2 INHALANT RESPIRATORY (INHALATION) EVERY 6 HOURS PRN
Status: DISCONTINUED | OUTPATIENT
Start: 2025-03-18 | End: 2025-03-21 | Stop reason: HOSPADM

## 2025-03-18 RX ORDER — ASPIRIN 81 MG/1
324 TABLET, CHEWABLE ORAL ONCE
Status: DISCONTINUED | OUTPATIENT
Start: 2025-03-18 | End: 2025-03-18

## 2025-03-18 RX ORDER — IOPAMIDOL 755 MG/ML
80 INJECTION, SOLUTION INTRAVASCULAR
Status: COMPLETED | OUTPATIENT
Start: 2025-03-18 | End: 2025-03-18

## 2025-03-18 RX ADMIN — RIVAROXABAN 20 MG: 20 TABLET, FILM COATED ORAL at 23:00

## 2025-03-18 RX ADMIN — HYDROCODONE BITARTRATE AND ACETAMINOPHEN 1 TABLET: 5; 325 TABLET ORAL at 14:36

## 2025-03-18 RX ADMIN — SODIUM CHLORIDE, PRESERVATIVE FREE 10 ML: 5 INJECTION INTRAVENOUS at 23:02

## 2025-03-18 RX ADMIN — ASPIRIN 81 MG: 81 TABLET, CHEWABLE ORAL at 23:00

## 2025-03-18 RX ADMIN — IOPAMIDOL 80 ML: 755 INJECTION, SOLUTION INTRAVENOUS at 14:40

## 2025-03-18 RX ADMIN — FUROSEMIDE 20 MG: 20 TABLET ORAL at 23:00

## 2025-03-18 RX ADMIN — Medication 400 MG: at 23:00

## 2025-03-18 ASSESSMENT — PAIN SCALES - GENERAL
PAINLEVEL_OUTOF10: 5
PAINLEVEL_OUTOF10: 8
PAINLEVEL_OUTOF10: 8

## 2025-03-18 ASSESSMENT — PAIN DESCRIPTION - LOCATION
LOCATION: LEG
LOCATION: ABDOMEN
LOCATION: ABDOMEN

## 2025-03-18 ASSESSMENT — PAIN DESCRIPTION - DESCRIPTORS
DESCRIPTORS: ACHING

## 2025-03-18 NOTE — ED NOTES
ED TO INPATIENT SBAR HANDOFF    Patient Name: Negro Moody   :  1940  84 y.o.   Preferred Name    Family/Caregiver Present yes   Restraints no   C-SSRS: Risk of Suicide: No Risk  Sitter no   Sepsis Risk Score        Situation  Chief Complaint   Patient presents with    Abdominal Pain     Pt endorses ABD pain; BLLE w/ \"burning sensation\" in the feet x 3 months     Brief Description of Patient's Condition: pt to the ED with abd pain and is being admitted with generalized abd pain, bilat leg pain, peripheral edema, elevated bnp/trop, and abnormal EKG. Patient is very hard of hearing  Mental Status: oriented, alert, coherent, logical, thought processes intact, and able to concentrate and follow conversation  Arrived from: home    Imaging:   Vascular duplex lower extremity venous bilateral   Final Result      CTA CHEST ABDOMEN PELVIS W CONTRAST   Final Result        Abnormal labs:   Abnormal Labs Reviewed   CBC WITH AUTO DIFFERENTIAL - Abnormal; Notable for the following components:       Result Value    RBC 4.08 (*)     Hemoglobin 12.0 (*)     MCHC 29.6 (*)     RDW 15.1 (*)     Lymphocytes % 18 (*)     Monocytes % 10 (*)     Eosinophils % 5 (*)     All other components within normal limits   COMPREHENSIVE METABOLIC PANEL W/ REFLEX TO MG FOR LOW K - Abnormal; Notable for the following components:    Total Protein 6.1 (*)     Albumin 3.3 (*)     ALT 7 (*)     All other components within normal limits   LIPASE - Abnormal; Notable for the following components:    Lipase 11 (*)     All other components within normal limits   BRAIN NATRIURETIC PEPTIDE - Abnormal; Notable for the following components:    NT Pro-BNP 5,855 (*)     All other components within normal limits   TROPONIN - Abnormal; Notable for the following components:    Troponin, High Sensitivity 25 (*)     All other components within normal limits   TROPONIN - Abnormal; Notable for the following components:    Troponin, High Sensitivity 26 (*)     All

## 2025-03-18 NOTE — ED PROVIDER NOTES
EMERGENCY DEPARTMENT ENCOUNTER      CHIEF COMPLAINT:   Abdominal pain    HPI: Negro Moody is a 84 y.o. female who presents to the Emergency Department complaining of abdominal pain and bilateral leg and foot pain.  The patient states that she is having intermittent generalized abdominal pain for the past few months that became significantly worse over the past few days.  It is located throughout the abdomen and tends to move.  It radiates to bilateral flanks.  It is sharp and stabbing pain.  She is nauseated and having intermittent vomiting every few days.  She also complains of pain as well as numbness and tingling to her lower legs and feet.  They have been swollen.  She has a history of DVT in 2016.  She is anticoagulated on Xarelto. There are no exacerbating or relieving factors.  She also states that her left breast has been erythematous with skin changes for the past few months.  The patient denies fevers, chills, hematemesis, bloody stools, flank pain, or any other complaints.      REVIEW OF SYSTEMS:  CONSTITUTIONAL:  Denies fever, chills, weight loss or weakness  EYES:  Denies photophobia or discharge  ENT:  Denies sore throat or ear pain  CARDIOVASCULAR:  Denies chest pain, palpitations or swelling  RESPIRATORY:  Denies cough or shortness of breath  GI: See HPI  MUSCULOSKELETAL: See HPI  SKIN: See HPI  NEUROLOGIC:  Denies headache, focal weakness or sensory changes  All systems negative except as marked.  \"Remaining review of systems reviewed and negative. I have reviewed the nursing triage documentation and agree unless otherwise noted below.\"    PAST MEDICAL HISTORY:   Past Medical History:   Diagnosis Date    Arthritis     COPD (chronic obstructive pulmonary disease) (HCC)     DVT of deep femoral vein (HCC) 07/13/2016    H/O echocardiogram Limited 11/19/2019    MOD AS, JOLENE 1.3 sq    H/O right and left heart catheterization 11/15/2022    Moderate to severe aortic stenosis, normal coronaries. TAVR  tachycardic at times.  She is hypertensive with a known history of hypertension and is asymptomatic.  She is otherwise hemodynamically stable and neurologically intact. Physical exam is significant for generalized abdominal tenderness to palpation with no guarding or rebound.  There is bilateral CVA tenderness.  There is no midline back tenderness.  Lungs are clear to auscultation bilaterally.  Heart sounds are normal with regular rhythm.  Left breast has some swelling around the areola with skin thickening.  There is no necrosis or skin breakdown.  There is no erythema, warmth or obvious abscess.  There is no drainage from the nipple.  There is bilateral lower extremity edema. EKG shows a normal sinus rhythm with no ST elevation or depression.  There are new t-wave inversions in leads III, aVF, V2, V3, V4, V5, V6.  Labs are obtained and are significant for an elevated troponin of 25, and elevated BNP of 5855, normal electrolytes, normal renal function, normal white blood cell count, mild anemia with a hemoglobin of 12 and CTA of the chest, abdomen and pelvis is negative for aortic aneurysm, dissection or large vessel occlusion.  There is greater than 50% stenosis at the right renal artery origin.  There is no CT evidence for acute pathology in the abdomen and pelvis.  Results were discussed with the patient.  The patient was treated with medications as below and felt significantly better.     Patient was given the following medications:  Medications   aspirin chewable tablet 324 mg (has no administration in time range)   HYDROcodone-acetaminophen (NORCO) 5-325 MG per tablet 1 tablet (1 tablet Oral Given 3/18/25 1436)   iopamidol (ISOVUE-370) 76 % injection 80 mL (80 mLs IntraVENous Given 3/18/25 1440)     Diagnosis and Differential Diagnosis:  The etiology of the patient's abdominal pain is not entirely clear.  The etiology of the patient's leg pain is not entirely clear either.  She does have elevated heart

## 2025-03-18 NOTE — H&P
structures. The examination is reviewed at lung and soft tissue windows, then 2D and 3D MIP and MPR reconstructions are performed of the target arterial structures on an independent workstation. CT radiation dose optimization techniques (automated exposure control, and use of iterative reconstruction techniques, or adjustment of the mA and/or kV according to patient size) were used to limit patient radiation dose. CTA Chest: Limitations: None. FINDINGS: Lungs: Small bilateral pleural effusions are seen. Soft Tissues: No significant chest wall or mediastinal abnormalities. Heart: Cardiomegaly. A small pericardial effusion is seen.  Tricuspid aortic valve. Aortic valvular sclerosis consistent with the history of aortic stenosis. Pulmonary Vasculature: Normal size and morphology. No large or central PE is evident. Thoracic Aorta: A transcatheter aortic valve prosthesis is seen. Normal caliber.  No evidence of aneurysm, dissection, or stenosis is noted. No significant vascular abnormalities are identified. The aorta is not porcelain. Brachiocephalic Vessels: Classic origins without evidence of stenosis or dissection. The vertebral arteries are co-dominant. IMPRESSION: 1. No CT evidence for a thoracic aortic aneurysm or dissection. 2. Small bilateral pleural effusions and a small pericardial effusion. CTA Abdomen/Pelvis: Limitations:  None. FINDINGS: Abdomen:  No acute abnormalities of the abdominal organs or bowel. Retroperitoneum: No retroperitoneal abnormalities. The kidneys appear unremarkable. Pelvis:  No abnormalities in the pelvic organs. Soft Tissues:  No significant soft tissue abnormalities. Abdominal Aorta:  Aortic atherosclerotic vascular disease is seen.  Normal caliber, no evidence of aneurysm, dissection, or stenosis. Visceral  Vessels: Separate origins of the common hepatic artery and splenic artery is seen. 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 There is no evidence of  stenosis or dissection. Renal Arteries: Calcified plaque at the origins of the bilateral renal arteries is seen. Greater than 50% stenosis in the right renal artery origin is suspected. Single renal arteries without evidence of any other stenosis or dissection. Iliac Systems:  Iliac atherosclerotic vascular disease is seen. No evidence of aneurysm, dissection or stenosis. IMPRESSION: 1. No CT evidence of an abdominal aortic aneurysm, dissection, or large vessel occlusion. 2. Suggestion of greater than 50% stenosis at the right renal artery origin. 3. No CT evidence for acute pathology in the abdomen and pelvis.  Dictated and Electronically Signed By: Artur Chung MD 3/18/2025 15:40            Electronically signed by Yifan Moore MD on 3/18/2025 at 5:17 PM

## 2025-03-18 NOTE — ED NOTES
[id number] perfect serve message sent to Victoriano MARTINEZ taking calls for Dr Aly    [phone] Victoriano MARTINEZ acknowledged perfect serve message. Added to treatment team.

## 2025-03-19 ENCOUNTER — APPOINTMENT (OUTPATIENT)
Dept: NON INVASIVE DIAGNOSTICS | Age: 85
DRG: 291 | End: 2025-03-19
Payer: COMMERCIAL

## 2025-03-19 ENCOUNTER — APPOINTMENT (OUTPATIENT)
Dept: NUCLEAR MEDICINE | Age: 85
DRG: 291 | End: 2025-03-19
Payer: COMMERCIAL

## 2025-03-19 PROBLEM — I50.9 ACUTE EXACERBATION OF CHRONIC HEART FAILURE (HCC): Status: ACTIVE | Noted: 2025-03-19

## 2025-03-19 PROBLEM — R10.84 GENERALIZED ABDOMINAL PAIN: Status: ACTIVE | Noted: 2025-03-19

## 2025-03-19 LAB
ANION GAP SERPL CALCULATED.3IONS-SCNC: 8 MMOL/L (ref 9–17)
BASOPHILS # BLD: 0.05 K/UL
BASOPHILS NFR BLD: 1 % (ref 0–1)
BUN SERPL-MCNC: 13 MG/DL (ref 7–20)
CALCIUM SERPL-MCNC: 8.6 MG/DL (ref 8.3–10.6)
CHLORIDE SERPL-SCNC: 105 MMOL/L (ref 99–110)
CO2 SERPL-SCNC: 29 MMOL/L (ref 21–32)
CREAT SERPL-MCNC: 0.8 MG/DL (ref 0.6–1.2)
ECHO AV AREA PEAK VELOCITY: 1.9 CM2
ECHO AV AREA VTI: 2.1 CM2
ECHO AV AREA/BSA PEAK VELOCITY: 0.8 CM2/M2
ECHO AV AREA/BSA VTI: 0.9 CM2/M2
ECHO AV MEAN GRADIENT: 9 MMHG
ECHO AV MEAN VELOCITY: 1.4 M/S
ECHO AV PEAK GRADIENT: 16 MMHG
ECHO AV PEAK VELOCITY: 2 M/S
ECHO AV VELOCITY RATIO: 0.45
ECHO AV VTI: 36.7 CM
ECHO BSA: 2.3 M2
ECHO BSA: 2.3 M2
ECHO EST RA PRESSURE: 3 MMHG
ECHO LA AREA 4C: 26 CM2
ECHO LA DIAMETER INDEX: 2.13 CM/M2
ECHO LA DIAMETER: 4.8 CM
ECHO LA MAJOR AXIS: 6.6 CM
ECHO LA VOL MOD A4C: 89 ML (ref 22–52)
ECHO LA VOLUME INDEX MOD A4C: 40 ML/M2 (ref 16–34)
ECHO LV E' LATERAL VELOCITY: 8.1 CM/S
ECHO LV EDV A4C: 54 ML
ECHO LV EDV INDEX A4C: 24 ML/M2
ECHO LV EF PHYSICIAN: 65 %
ECHO LV EJECTION FRACTION A4C: 68 %
ECHO LV ESV A4C: 17 ML
ECHO LV ESV INDEX A4C: 8 ML/M2
ECHO LV FRACTIONAL SHORTENING: 38 % (ref 28–44)
ECHO LV INTERNAL DIMENSION DIASTOLE INDEX: 2.31 CM/M2
ECHO LV INTERNAL DIMENSION DIASTOLIC: 5.2 CM (ref 3.9–5.3)
ECHO LV INTERNAL DIMENSION SYSTOLIC INDEX: 1.42 CM/M2
ECHO LV INTERNAL DIMENSION SYSTOLIC: 3.2 CM
ECHO LV IVSD: 1.2 CM (ref 0.6–0.9)
ECHO LV MASS 2D: 263.4 G (ref 67–162)
ECHO LV MASS INDEX 2D: 117.1 G/M2 (ref 43–95)
ECHO LV POSTERIOR WALL DIASTOLIC: 1.3 CM (ref 0.6–0.9)
ECHO LV RELATIVE WALL THICKNESS RATIO: 0.5
ECHO LVOT AREA: 4.2 CM2
ECHO LVOT AV VTI INDEX: 0.51
ECHO LVOT DIAM: 2.3 CM
ECHO LVOT MEAN GRADIENT: 2 MMHG
ECHO LVOT PEAK GRADIENT: 3 MMHG
ECHO LVOT PEAK VELOCITY: 0.9 M/S
ECHO LVOT STROKE VOLUME INDEX: 34.7 ML/M2
ECHO LVOT SV: 78.1 ML
ECHO LVOT VTI: 18.8 CM
ECHO MV A VELOCITY: 1.32 M/S
ECHO MV E DECELERATION TIME (DT): 161 MS
ECHO MV E VELOCITY: 1.09 M/S
ECHO MV E/A RATIO: 0.83
ECHO MV E/E' LATERAL: 13.46
ECHO RIGHT VENTRICULAR SYSTOLIC PRESSURE (RVSP): 38 MMHG
ECHO RV MID DIMENSION: 4.5 CM
ECHO TV REGURGITANT MAX VELOCITY: 2.96 M/S
ECHO TV REGURGITANT PEAK GRADIENT: 35 MMHG
EKG ATRIAL RATE: 97 BPM
EKG ATRIAL RATE: 99 BPM
EKG DIAGNOSIS: NORMAL
EKG DIAGNOSIS: NORMAL
EKG P AXIS: 30 DEGREES
EKG P AXIS: 33 DEGREES
EKG P-R INTERVAL: 158 MS
EKG P-R INTERVAL: 164 MS
EKG Q-T INTERVAL: 402 MS
EKG Q-T INTERVAL: 406 MS
EKG QRS DURATION: 116 MS
EKG QRS DURATION: 128 MS
EKG QTC CALCULATION (BAZETT): 515 MS
EKG QTC CALCULATION (BAZETT): 515 MS
EKG R AXIS: 0 DEGREES
EKG R AXIS: 7 DEGREES
EKG T AXIS: -30 DEGREES
EKG T AXIS: 9 DEGREES
EKG VENTRICULAR RATE: 97 BPM
EKG VENTRICULAR RATE: 99 BPM
EOSINOPHIL # BLD: 0.28 K/UL
EOSINOPHILS RELATIVE PERCENT: 5 % (ref 0–3)
ERYTHROCYTE [DISTWIDTH] IN BLOOD BY AUTOMATED COUNT: 14.9 % (ref 11.7–14.9)
GFR, ESTIMATED: 70 ML/MIN/1.73M2
GLUCOSE SERPL-MCNC: 89 MG/DL (ref 74–99)
HCT VFR BLD AUTO: 34.8 % (ref 37–47)
HGB BLD-MCNC: 10.5 G/DL (ref 12.5–16)
IMM GRANULOCYTES # BLD AUTO: 0.01 K/UL
IMM GRANULOCYTES NFR BLD: 0 %
LYMPHOCYTES NFR BLD: 1.14 K/UL
LYMPHOCYTES RELATIVE PERCENT: 22 % (ref 24–44)
MCH RBC QN AUTO: 29.3 PG (ref 27–31)
MCHC RBC AUTO-ENTMCNC: 30.2 G/DL (ref 32–36)
MCV RBC AUTO: 97.2 FL (ref 78–100)
MONOCYTES NFR BLD: 0.78 K/UL
MONOCYTES NFR BLD: 15 % (ref 0–4)
NEUTROPHILS NFR BLD: 56 % (ref 36–66)
NEUTS SEG NFR BLD: 2.93 K/UL
NUC STRESS EJECTION FRACTION: 54 %
PLATELET # BLD AUTO: 121 K/UL (ref 140–440)
PMV BLD AUTO: 9.8 FL (ref 7.5–11.1)
POTASSIUM SERPL-SCNC: 3.6 MMOL/L (ref 3.5–5.1)
RBC # BLD AUTO: 3.58 M/UL (ref 4.2–5.4)
SODIUM SERPL-SCNC: 142 MMOL/L (ref 136–145)
STRESS BASELINE DIAS BP: 74 MMHG
STRESS BASELINE HR: 96 BPM
STRESS BASELINE ST DEPRESSION: 0 MM
STRESS BASELINE SYS BP: 153 MMHG
STRESS ESTIMATED WORKLOAD: 1 METS
STRESS PEAK DIAS BP: 74 MMHG
STRESS PEAK SYS BP: 153 MMHG
STRESS PERCENT HR ACHIEVED: 75 %
STRESS POST PEAK HR: 103 BPM
STRESS RATE PRESSURE PRODUCT: NORMAL BPM*MMHG
STRESS ST DEPRESSION: 0 MM
STRESS TARGET HR: 137 BPM
TID: 0.89
WBC OTHER # BLD: 5.2 K/UL (ref 4–10.5)

## 2025-03-19 PROCEDURE — 96374 THER/PROPH/DIAG INJ IV PUSH: CPT

## 2025-03-19 PROCEDURE — G0378 HOSPITAL OBSERVATION PER HR: HCPCS

## 2025-03-19 PROCEDURE — 99223 1ST HOSP IP/OBS HIGH 75: CPT | Performed by: INTERNAL MEDICINE

## 2025-03-19 PROCEDURE — 93017 CV STRESS TEST TRACING ONLY: CPT

## 2025-03-19 PROCEDURE — 6360000002 HC RX W HCPCS

## 2025-03-19 PROCEDURE — 93010 ELECTROCARDIOGRAM REPORT: CPT | Performed by: INTERNAL MEDICINE

## 2025-03-19 PROCEDURE — 6360000002 HC RX W HCPCS: Performed by: INTERNAL MEDICINE

## 2025-03-19 PROCEDURE — 94761 N-INVAS EAR/PLS OXIMETRY MLT: CPT

## 2025-03-19 PROCEDURE — 93306 TTE W/DOPPLER COMPLETE: CPT

## 2025-03-19 PROCEDURE — 6360000002 HC RX W HCPCS: Performed by: NURSE PRACTITIONER

## 2025-03-19 PROCEDURE — APPNB15 APP NON BILLABLE TIME 0-15 MINS

## 2025-03-19 PROCEDURE — 85025 COMPLETE CBC W/AUTO DIFF WBC: CPT

## 2025-03-19 PROCEDURE — 6370000000 HC RX 637 (ALT 250 FOR IP): Performed by: INTERNAL MEDICINE

## 2025-03-19 PROCEDURE — 36415 COLL VENOUS BLD VENIPUNCTURE: CPT

## 2025-03-19 PROCEDURE — 93005 ELECTROCARDIOGRAM TRACING: CPT | Performed by: INTERNAL MEDICINE

## 2025-03-19 PROCEDURE — 1200000000 HC SEMI PRIVATE

## 2025-03-19 PROCEDURE — 2500000003 HC RX 250 WO HCPCS: Performed by: STUDENT IN AN ORGANIZED HEALTH CARE EDUCATION/TRAINING PROGRAM

## 2025-03-19 PROCEDURE — 93018 CV STRESS TEST I&R ONLY: CPT | Performed by: INTERNAL MEDICINE

## 2025-03-19 PROCEDURE — 93306 TTE W/DOPPLER COMPLETE: CPT | Performed by: INTERNAL MEDICINE

## 2025-03-19 PROCEDURE — 80048 BASIC METABOLIC PNL TOTAL CA: CPT

## 2025-03-19 PROCEDURE — A9500 TC99M SESTAMIBI: HCPCS | Performed by: INTERNAL MEDICINE

## 2025-03-19 PROCEDURE — 6370000000 HC RX 637 (ALT 250 FOR IP): Performed by: STUDENT IN AN ORGANIZED HEALTH CARE EDUCATION/TRAINING PROGRAM

## 2025-03-19 PROCEDURE — 78452 HT MUSCLE IMAGE SPECT MULT: CPT | Performed by: INTERNAL MEDICINE

## 2025-03-19 PROCEDURE — 96375 TX/PRO/DX INJ NEW DRUG ADDON: CPT

## 2025-03-19 PROCEDURE — 78452 HT MUSCLE IMAGE SPECT MULT: CPT

## 2025-03-19 PROCEDURE — 93016 CV STRESS TEST SUPVJ ONLY: CPT | Performed by: INTERNAL MEDICINE

## 2025-03-19 PROCEDURE — 3430000000 HC RX DIAGNOSTIC RADIOPHARMACEUTICAL: Performed by: INTERNAL MEDICINE

## 2025-03-19 RX ORDER — TETRAKIS(2-METHOXYISOBUTYLISOCYANIDE)COPPER(I) TETRAFLUOROBORATE 1 MG/ML
11 INJECTION, POWDER, LYOPHILIZED, FOR SOLUTION INTRAVENOUS
Status: COMPLETED | OUTPATIENT
Start: 2025-03-19 | End: 2025-03-19

## 2025-03-19 RX ORDER — FUROSEMIDE 10 MG/ML
40 INJECTION INTRAMUSCULAR; INTRAVENOUS 2 TIMES DAILY
Status: DISCONTINUED | OUTPATIENT
Start: 2025-03-19 | End: 2025-03-20

## 2025-03-19 RX ORDER — TETRAKIS(2-METHOXYISOBUTYLISOCYANIDE)COPPER(I) TETRAFLUOROBORATE 1 MG/ML
33 INJECTION, POWDER, LYOPHILIZED, FOR SOLUTION INTRAVENOUS
Status: COMPLETED | OUTPATIENT
Start: 2025-03-19 | End: 2025-03-19

## 2025-03-19 RX ORDER — HYDROCODONE BITARTRATE AND ACETAMINOPHEN 5; 325 MG/1; MG/1
1 TABLET ORAL EVERY 8 HOURS PRN
Refills: 0 | Status: DISCONTINUED | OUTPATIENT
Start: 2025-03-19 | End: 2025-03-21 | Stop reason: HOSPADM

## 2025-03-19 RX ORDER — REGADENOSON 0.08 MG/ML
0.4 INJECTION, SOLUTION INTRAVENOUS
Status: COMPLETED | OUTPATIENT
Start: 2025-03-19 | End: 2025-03-19

## 2025-03-19 RX ADMIN — ASPIRIN 81 MG: 81 TABLET, CHEWABLE ORAL at 10:27

## 2025-03-19 RX ADMIN — FUROSEMIDE 40 MG: 10 INJECTION, SOLUTION INTRAMUSCULAR; INTRAVENOUS at 14:08

## 2025-03-19 RX ADMIN — SODIUM CHLORIDE, PRESERVATIVE FREE 10 ML: 5 INJECTION INTRAVENOUS at 21:55

## 2025-03-19 RX ADMIN — REGADENOSON 0.4 MG: 0.08 INJECTION, SOLUTION INTRAVENOUS at 12:22

## 2025-03-19 RX ADMIN — KIT FOR THE PREPARATION OF TECHNETIUM TC99M SESTAMIBI 33 MILLICURIE: 1 INJECTION, POWDER, LYOPHILIZED, FOR SOLUTION PARENTERAL at 13:35

## 2025-03-19 RX ADMIN — HYDROCODONE BITARTRATE AND ACETAMINOPHEN 1 TABLET: 5; 325 TABLET ORAL at 14:22

## 2025-03-19 RX ADMIN — SODIUM CHLORIDE, PRESERVATIVE FREE 10 ML: 5 INJECTION INTRAVENOUS at 10:27

## 2025-03-19 RX ADMIN — RIVAROXABAN 20 MG: 20 TABLET, FILM COATED ORAL at 17:49

## 2025-03-19 RX ADMIN — KIT FOR THE PREPARATION OF TECHNETIUM TC99M SESTAMIBI 11 MILLICURIE: 1 INJECTION, POWDER, LYOPHILIZED, FOR SOLUTION PARENTERAL at 13:35

## 2025-03-19 RX ADMIN — HYDROCODONE BITARTRATE AND ACETAMINOPHEN 1 TABLET: 5; 325 TABLET ORAL at 21:52

## 2025-03-19 RX ADMIN — KETOROLAC TROMETHAMINE 15 MG: 30 INJECTION, SOLUTION INTRAMUSCULAR; INTRAVENOUS at 01:10

## 2025-03-19 RX ADMIN — Medication 400 MG: at 10:27

## 2025-03-19 ASSESSMENT — PAIN DESCRIPTION - DESCRIPTORS
DESCRIPTORS: ACHING;DISCOMFORT
DESCRIPTORS: ACHING
DESCRIPTORS: ACHING;DISCOMFORT;SORE;NAGGING

## 2025-03-19 ASSESSMENT — PAIN DESCRIPTION - LOCATION
LOCATION: LEG
LOCATION: GENERALIZED
LOCATION: OTHER (COMMENT)
LOCATION: BACK

## 2025-03-19 ASSESSMENT — PAIN SCALES - GENERAL
PAINLEVEL_OUTOF10: 10
PAINLEVEL_OUTOF10: 2
PAINLEVEL_OUTOF10: 8
PAINLEVEL_OUTOF10: 8

## 2025-03-19 NOTE — PROGRESS NOTES
4 Eyes Skin Assessment     NAME:  Negro Moody  YOB: 1940  MEDICAL RECORD NUMBER:  [id number]    The patient is being assessed for  Admission    I agree that at least one RN has performed a thorough Head to Toe Skin Assessment on the patient. ALL assessment sites listed below have been assessed.      Areas assessed by both nurses:    Head, Face, Ears, Shoulders, Back, Chest, Arms, Elbows, Hands, Sacrum. Buttock, Coccyx, Ischium, Legs. Feet and Heels, and Under Medical Devices         Does the Patient have a Wound? No noted wound(s)       Maximo Prevention initiated by RN: Yes  Wound Care Orders initiated by RN: No    Pressure Injury (Stage 3,4, Unstageable, DTI, NWPT, and Complex wounds) if present, place Wound referral order by RN under : No    New Ostomies, if present place, Ostomy referral order under : No     Nurse 1 eSignature: Electronically signed by Amaya Lozano RN on 3/18/25 at 11:53 PM EDT    **SHARE this note so that the co-signing nurse can place an eSignature**    Nurse 2 eSignature: Electronically signed by Torie Vargas RN on 3/19/25 at 6:49 AM EDT

## 2025-03-19 NOTE — CARE COORDINATION
03/19/25 1011   Service Assessment   Patient Orientation Alert and Oriented   Cognition Alert   History Provided By Patient   Primary Caregiver Self   Support Systems Children;Friends/Neighbors   Patient's Healthcare Decision Maker is: Legal Next of Kin   PCP Verified by CM Yes   Prior Functional Level Assistance with the following:;Mobility   Current Functional Level Assistance with the following:;Mobility   Can patient return to prior living arrangement Unknown at present   Ability to make needs known: Good   Family able to assist with home care needs: Yes   Would you like for me to discuss the discharge plan with any other family members/significant others, and if so, who? No   Financial Resources Medicare;Medicaid   Community Resources None     CM in to see Pt to initiate discharge planning.  Pt is from home alone.  Pt has a home health aide x4 days a week.  Pt mentions recent weakness and a fall a few days ago.      PS to Dr. Pennington to update, PT/OT requested.    CM following for needs.

## 2025-03-19 NOTE — CONSULTS
CARDIOLOGY CONSULT NOTE    MD Sheeba     Name: Negro Moody    /Age/Sex: 1940 (84 y.o. female)  MRN & CSN: [id number] & [id number]    Admission Date/Time: 3/18/2025  1:01 PM  Location: [address] [place] [address]    PCP: Herbert Goode MD  Admit Date: 3/18/2025  Hospital Day: 2          Reason for Consultation:   Chest pain      History of present illness:Negro is a 84 y.o.year old who is admitted with   Chief Complaint   Patient presents with    Abdominal Pain     Pt endorses ABD pain; BLLE w/ \"burning sensation\" in the feet x 3 months        Patient is a pleasant 81-year-old female who presents to the hospital with chief complaint of chest pain as well as bilateral lower extremity pains, she also complains of abdominal pain and burning sensation in thighs.    Patient has prior medical history significant for severe aortic stenosis status post TAVR , history of heart failure with preserved ejection fraction, history of normal coronary angiogram in the past.    Patient complains of left breast pain which is different from chest pain that she has been experiencing, she feels that her left breast is fairly heavy and tender to touch compared to the right side.    EKG shows sinus rhythm  proBNP is 5855.           Left Ventricle: Normal left ventricular systolic function with a visually estimated EF of 55 - 60%. Left ventricle size is normal. Mildly increased wall thickness. Normal wall motion.    Right Ventricle: Right ventricle is severely dilated. Reduced systolic function.    Aortic Valve: Medtronic Evolut Pro transcatheter bioprosthetic valve with a size of 34 mm. AV mean gradient is 9 mmHg.    Mitral Valve: Mild regurgitation.    Tricuspid Valve: Mild regurgitation. The estimated RVSP is 38 mmHg.    Left Atrium: Left atrium is severely dilated.    Right Atrium: Right atrium is dilated.    Pericardium: There is evidence of epicardial fat. No pericardial effusion.    Pertinent Lab Personally Review    for the consult       Dr. ESTRELLA Aly  3/19/2025 2:41 PM

## 2025-03-19 NOTE — PROGRESS NOTES
Patient seen and examined by Dr. Aly and I.    Cardiology consulted for chest pain    Dyspnea on exertion    Patient also complaining of left leg pain    Plan:  Chest pain intermittent for 6 months  Dyspnea on exertion  Acute on chronic HFpEF  Hx TAVR 2023  Mastitis?      Stress test today negative for ischemia    Check echocardiogram    Prior LHC did not reveal CAD  2+ edema    Some pain of left breast, mild erythema    IV lasix 40 twice daily        Lamont Mooney PA-C 03/19/25 9:17 AM

## 2025-03-19 NOTE — PLAN OF CARE
Problem: Discharge Planning  Goal: Discharge to home or other facility with appropriate resources  3/19/2025 0345 by KARO NAVARRETE  Outcome: Progressing  3/18/2025 2316 by Amaya Lozano RN  Outcome: Progressing     Problem: Pain  Goal: Verbalizes/displays adequate comfort level or baseline comfort level  3/19/2025 0345 by KARO NAVARRETE  Outcome: Progressing  3/18/2025 2316 by Amaya Lozano RN  Outcome: Progressing     Problem: Skin/Tissue Integrity  Goal: Skin integrity remains intact  Description: 1.  Monitor for areas of redness and/or skin breakdown  2.  Assess vascular access sites hourly  3.  Every 4-6 hours minimum:  Change oxygen saturation probe site  4.  Every 4-6 hours:  If on nasal continuous positive airway pressure, respiratory therapy assess nares and determine need for appliance change or resting period  3/19/2025 0345 by KARO NAVARRETE  Outcome: Progressing  3/18/2025 2316 by Amaya Lozano RN  Outcome: Progressing     Problem: Safety - Adult  Goal: Free from fall injury  3/19/2025 0345 by KARO NAVARRETE  Outcome: Progressing  3/18/2025 2316 by Amaya Lozano RN  Outcome: Progressing

## 2025-03-19 NOTE — PROGRESS NOTES
V2.0  [facility] Hospitalist Progress Note      Name:  Negro Moody /Age/Sex: 1940  (84 y.o. female)   MRN & CSN:  [id number] & [id number] Encounter Date/Time: 3/19/2025 3:26 PM EDT    Location:  [address] [place] [address]-A PCP: Herbert Goode MD       Hospital Day: 2    Assessment and Plan:   Negro Moody is a 84 y.o. female with pmh of  who presents with Chest pain      Plan:      # Chest pain, likely musculoskeletal  # Status post TAVR  -Reports left-sided chest pain  -Troponins on presentation 25->26  -EKG normal sinus rhythm, T wave abnormalities  -Cardiology consulted--underwent stress and echocardiogram.  -Stress test WNL.  Prior LHC did not reveal CAD    Acute on chronic HFpEF  -Has bilateral pitting edema, plan for IV Lasix 40 mg twice daily.     # COPD  -Continue home inhalers     # History of DVT  -Continue Xarelto     # Hypertension  -Continue metoprolol     # Chronic pain  -On Norco  -PDMP reviewed.  Last prescription  with 84 tablets for 28 days     # Left breast fold fungal infection  -Low suspicion for bacterial mastitis  -Will add Micatin powder for breast folds.      Diet ADULT DIET; Regular   DVT Prophylaxis [] Lovenox, []  Heparin, [] SCDs, [] Ambulation,  [] Eliquis, [] Xarelto  [] Coumadin   Code Status DNR-CC   Disposition From:   Expected Disposition:   Estimated Date of Discharge:   Patient requires continued admission due to    Surrogate Decision Maker/ POA      Personally reviewed Lab Studies and Imaging     Subjective:     Chief Complaint: Abdominal Pain (Pt endorses ABD pain; BLLE w/ \"burning sensation\" in the feet x 3 months)       Negro Moody is a 84 y.o. female who presents with chest pain.    Seen and examined after the stress test.  States has leg pain due to edema.  Discussed discharge planning.  Wants to go home.         Review of Systems:    Review of Systems    Negative if not mentioned above    Objective:   No intake or output data in the 24 hours ending 25 6014  3.5 - 5.1 mmol/L    Chloride 105 99 - 110 mmol/L    CO2 29 21 - 32 mmol/L    Anion Gap 8 (L) 9 - 17 mmol/L    Glucose 89 74 - 99 mg/dL    BUN 13 7 - 20 mg/dL    Creatinine 0.8 0.6 - 1.2 mg/dL    Est, Glom Filt Rate 70 >60 mL/min/1.73m2    Calcium 8.6 8.3 - 10.6 mg/dL   CBC with Auto Differential    Collection Time: 03/19/25  3:41 AM   Result Value Ref Range    WBC 5.2 4.0 - 10.5 k/uL    RBC 3.58 (L) 4.20 - 5.40 m/uL    Hemoglobin 10.5 (L) 12.5 - 16.0 g/dL    Hematocrit 34.8 (L) 37.0 - 47.0 %    MCV 97.2 78.0 - 100.0 fL    MCH 29.3 27.0 - 31.0 pg    MCHC 30.2 (L) 32.0 - 36.0 g/dL    RDW 14.9 11.7 - 14.9 %    Platelets 121 (L) 140 - 440 k/uL    MPV 9.8 7.5 - 11.1 fL    Neutrophils % 56 36 - 66 %    Lymphocytes % 22 (L) 24 - 44 %    Monocytes % 15 (H) 0 - 4 %    Eosinophils % 5 (H) 0 - 3 %    Basophils % 1 0 - 1 %    Immature Granulocytes % 0 0 %    Neutrophils Absolute 2.93 k/uL    Lymphocytes Absolute 1.14 k/uL    Monocytes Absolute 0.78 k/uL    Eosinophils Absolute 0.28 k/uL    Basophils Absolute 0.05 k/uL    Immature Granulocytes Absolute 0.01 k/uL   Echo (TTE) complete (PRN contrast/bubble/strain/3D)    Collection Time: 03/19/25 12:58 PM   Result Value Ref Range    LV EDV A4C 54 mL    LV ESV A4C 17 mL    IVSd 1.2 (A) 0.6 - 0.9 cm    LVIDd 5.2 3.9 - 5.3 cm    LVIDs 3.2 cm    LVOT Diameter 2.3 cm    LVOT Mean Gradient 2 mmHg    LVOT VTI 18.8 cm    LVOT Peak Velocity 0.9 m/s    LVOT Peak Gradient 3 mmHg    LVPWd 1.3 (A) 0.6 - 0.9 cm    LV E' Lateral Velocity 8.10 cm/s    LV Ejection Fraction A4C 68 %    LVOT Area 4.2 cm2    LVOT SV 78.1 ml    LA Major Axis 6.6 cm    LA Area 4C 26.0 cm2    LA Volume MOD A4C 89 (A) 22 - 52 mL    LA Diameter 4.8 cm    AV Mean Gradient 9 mmHg    AV VTI 36.7 cm    AV Mean Velocity 1.4 m/s    AV Peak Velocity 2.0 m/s    AV Peak Gradient 16 mmHg    AV Area by VTI 2.1 cm2    AV Area by Peak Velocity 1.9 cm2    MV E Wave Deceleration Time 161.0 ms    MV A Velocity 1.32 m/s    MV E Velocity

## 2025-03-20 LAB
ANION GAP SERPL CALCULATED.3IONS-SCNC: 10 MMOL/L (ref 9–17)
BUN SERPL-MCNC: 13 MG/DL (ref 7–20)
CALCIUM SERPL-MCNC: 8.7 MG/DL (ref 8.3–10.6)
CHLORIDE SERPL-SCNC: 104 MMOL/L (ref 99–110)
CO2 SERPL-SCNC: 30 MMOL/L (ref 21–32)
CREAT SERPL-MCNC: 0.8 MG/DL (ref 0.6–1.2)
GFR, ESTIMATED: 69 ML/MIN/1.73M2
GLUCOSE SERPL-MCNC: 121 MG/DL (ref 74–99)
MAGNESIUM SERPL-MCNC: 1.5 MG/DL (ref 1.8–2.4)
POTASSIUM SERPL-SCNC: 3.5 MMOL/L (ref 3.5–5.1)
SODIUM SERPL-SCNC: 143 MMOL/L (ref 136–145)

## 2025-03-20 PROCEDURE — 1200000000 HC SEMI PRIVATE

## 2025-03-20 PROCEDURE — 6370000000 HC RX 637 (ALT 250 FOR IP): Performed by: STUDENT IN AN ORGANIZED HEALTH CARE EDUCATION/TRAINING PROGRAM

## 2025-03-20 PROCEDURE — 94761 N-INVAS EAR/PLS OXIMETRY MLT: CPT

## 2025-03-20 PROCEDURE — 2500000003 HC RX 250 WO HCPCS: Performed by: INTERNAL MEDICINE

## 2025-03-20 PROCEDURE — 6370000000 HC RX 637 (ALT 250 FOR IP): Performed by: INTERNAL MEDICINE

## 2025-03-20 PROCEDURE — 36415 COLL VENOUS BLD VENIPUNCTURE: CPT

## 2025-03-20 PROCEDURE — 2500000003 HC RX 250 WO HCPCS: Performed by: STUDENT IN AN ORGANIZED HEALTH CARE EDUCATION/TRAINING PROGRAM

## 2025-03-20 PROCEDURE — 6360000002 HC RX W HCPCS

## 2025-03-20 PROCEDURE — APPNB15 APP NON BILLABLE TIME 0-15 MINS

## 2025-03-20 PROCEDURE — 80048 BASIC METABOLIC PNL TOTAL CA: CPT

## 2025-03-20 PROCEDURE — 6360000002 HC RX W HCPCS: Performed by: INTERNAL MEDICINE

## 2025-03-20 PROCEDURE — 83735 ASSAY OF MAGNESIUM: CPT

## 2025-03-20 PROCEDURE — 99233 SBSQ HOSP IP/OBS HIGH 50: CPT | Performed by: INTERNAL MEDICINE

## 2025-03-20 RX ORDER — FUROSEMIDE 10 MG/ML
40 INJECTION INTRAMUSCULAR; INTRAVENOUS 2 TIMES DAILY
Status: DISCONTINUED | OUTPATIENT
Start: 2025-03-20 | End: 2025-03-21 | Stop reason: HOSPADM

## 2025-03-20 RX ORDER — MAGNESIUM SULFATE IN WATER 40 MG/ML
2000 INJECTION, SOLUTION INTRAVENOUS ONCE
Status: COMPLETED | OUTPATIENT
Start: 2025-03-20 | End: 2025-03-20

## 2025-03-20 RX ORDER — FUROSEMIDE 40 MG/1
40 TABLET ORAL 2 TIMES DAILY
Status: DISCONTINUED | OUTPATIENT
Start: 2025-03-20 | End: 2025-03-20

## 2025-03-20 RX ADMIN — SODIUM CHLORIDE, PRESERVATIVE FREE 10 ML: 5 INJECTION INTRAVENOUS at 20:50

## 2025-03-20 RX ADMIN — HYDROCODONE BITARTRATE AND ACETAMINOPHEN 1 TABLET: 5; 325 TABLET ORAL at 17:30

## 2025-03-20 RX ADMIN — FUROSEMIDE 40 MG: 10 INJECTION, SOLUTION INTRAMUSCULAR; INTRAVENOUS at 20:50

## 2025-03-20 RX ADMIN — MAGNESIUM SULFATE HEPTAHYDRATE 2000 MG: 40 INJECTION, SOLUTION INTRAVENOUS at 16:03

## 2025-03-20 RX ADMIN — SODIUM CHLORIDE, PRESERVATIVE FREE 10 ML: 5 INJECTION INTRAVENOUS at 10:11

## 2025-03-20 RX ADMIN — MICONAZOLE NITRATE: 2 POWDER TOPICAL at 20:36

## 2025-03-20 RX ADMIN — RIVAROXABAN 20 MG: 20 TABLET, FILM COATED ORAL at 17:30

## 2025-03-20 RX ADMIN — MICONAZOLE NITRATE: 2 POWDER TOPICAL at 10:13

## 2025-03-20 RX ADMIN — ASPIRIN 81 MG: 81 TABLET, CHEWABLE ORAL at 10:11

## 2025-03-20 RX ADMIN — FUROSEMIDE 40 MG: 10 INJECTION, SOLUTION INTRAMUSCULAR; INTRAVENOUS at 10:35

## 2025-03-20 RX ADMIN — Medication 400 MG: at 10:11

## 2025-03-20 ASSESSMENT — PAIN SCALES - GENERAL
PAINLEVEL_OUTOF10: 2
PAINLEVEL_OUTOF10: 7
PAINLEVEL_OUTOF10: 9

## 2025-03-20 ASSESSMENT — PAIN DESCRIPTION - DESCRIPTORS
DESCRIPTORS: BURNING
DESCRIPTORS: BURNING

## 2025-03-20 ASSESSMENT — PAIN DESCRIPTION - LOCATION
LOCATION: GENERALIZED
LOCATION: LEG
LOCATION: LEG

## 2025-03-20 ASSESSMENT — PAIN SCALES - WONG BAKER
WONGBAKER_NUMERICALRESPONSE: NO HURT
WONGBAKER_NUMERICALRESPONSE: NO HURT

## 2025-03-20 ASSESSMENT — PAIN DESCRIPTION - ORIENTATION
ORIENTATION: LEFT;RIGHT
ORIENTATION: LEFT;RIGHT

## 2025-03-20 NOTE — PROGRESS NOTES
diuretics.  Emphasize importance of diuresis in setting of heart failure.    Patient is still slightly short of breath and still has edema.    Objective: Temperature:  Current - Temp: 97.8 °F (36.6 °C); Max - Temp  Av.9 °F (36.6 °C)  Min: 97.7 °F (36.5 °C)  Max: 98.2 °F (36.8 °C)    Respiratory Rate : Resp  Av.2  Min: 17  Max: 26    Pulse Range: Pulse  Av.8  Min: 53  Max: 104    Blood Presuure Range:  Systolic (24hrs), Av , Min:95 , Max:137   ; Diastolic (24hrs), Av, Min:47, Max:79      Pulse ox Range: SpO2  Av %  Min: 100 %  Max: 100 %    24hr I & O:  No intake or output data in the 24 hours ending 25 1455      /65   Pulse 73   Temp 97.8 °F (36.6 °C) (Oral)   Resp 17   Ht 1.803 m (5' 11\")   Wt 105.2 kg (232 lb)   SpO2 100%   BMI 32.36 kg/m²         TELEMETRY: Sinus with intermittent second-degree block versus blocked PACs     has a past medical history of Arthritis, COPD (chronic obstructive pulmonary disease) ([facility]), DVT of deep femoral vein ([facility]), H/O echocardiogram Limited, H/O right and left heart catheterization, History of echocardiogram, Hx of Doppler echocardiogram, Hypertension, PAD (peripheral artery disease), Pneumonia, Seizures ([facility]), and Vertigo.   has a past surgical history that includes  section; Endoscopy, colon, diagnostic; Colonoscopy; Cardiac surgery; and Anomalous pulmonary venous return repair, total (2023).    Physical Exam  Constitutional:       Appearance: She is not ill-appearing.   HENT:      Mouth/Throat:      Comments: Pupils equal and round  Cardiovascular:      Rate and Rhythm: Normal rate and regular rhythm.   Pulmonary:      Effort: Pulmonary effort is normal.      Breath sounds: Rales present.   Abdominal:      Palpations: Abdomen is soft.   Musculoskeletal:      Right lower le+ Pitting Edema (Bilateral legs appear glossy) present.      Left lower le+ Pitting Edema present.   Skin:     General: Skin is warm.       Capillary Refill: Capillary refill takes less than 2 seconds.   Neurological:      Mental Status: She is alert and oriented to person, place, and time.          Medications:    furosemide  40 mg IntraVENous BID    magnesium sulfate  2,000 mg IntraVENous Once    miconazole   Topical BID    sodium chloride flush  5-40 mL IntraVENous 2 times per day    aspirin  81 mg Oral Daily    magnesium oxide  400 mg Oral Daily    rivaroxaban  20 mg Oral Dinner      sodium chloride       HYDROcodone-acetaminophen, sodium chloride flush, sodium chloride, potassium chloride **OR** potassium alternative oral replacement **OR** potassium chloride, magnesium sulfate, polyethylene glycol, acetaminophen **OR** acetaminophen, albuterol, albuterol sulfate HFA    Lab Data:  CBC:   Recent Labs     03/18/25  1258 03/19/25  0341   WBC 5.3 5.2   HGB 12.0* 10.5*   HCT 40.5 34.8*   MCV 99.3 97.2    121*     BMP:   Recent Labs     03/18/25  1258 03/19/25  0341 03/20/25  1123    142 143   K 3.9 3.6 3.5    105 104   CO2 27 29 30   BUN 13 13 13   CREATININE 0.8 0.8 0.8     LIVER PROFILE:   Recent Labs     03/18/25  1258   AST 26   ALT 7*   LIPASE 11*   BILITOT 1.0   ALKPHOS 75     PT/INR: No results for input(s): \"PROTIME\", \"INR\" in the last 72 hours.  APTT: No results for input(s): \"APTT\" in the last 72 hours.  BNP:  No results for input(s): \"BNP\" in the last 72 hours.  TROPONIN: No results for input(s): \"TROPONINT\" in the last 72 hours.  Labs, consult, tests reviewed          Lamont Mooney PA-C 3/20/2025 2:55 PM

## 2025-03-20 NOTE — PROGRESS NOTES
Occupational and Physical Therapy  Occupational and physical therapy attempted to see pt today for evaluation. RN hold on this date as pt is, \"very sick.\" Will re-attempt evaluation at a later time.

## 2025-03-20 NOTE — PROGRESS NOTES
V2.0  [facility] [place] Progress Note      Name:  Negro Moody /Age/Sex: 1940  (84 y.o. female)   MRN & CSN:  [id number] & [id number] Encounter Date/Time: 3/20/2025 3:26 PM EDT    [facility] PCP: Herbert Goode MD       Hospital Day: 3    Assessment and Plan:   Negro Moody is a 84 y.o. female with pmh of  who presents with Chest pain      Plan:      # Chest pain, likely musculoskeletal  # Status post TAVR  -Reports left-sided chest pain  -Troponins on presentation 25->26  -EKG normal sinus rhythm, T wave abnormalities  -Cardiology consulted--underwent stress and echocardiogram.  -Stress test WNL.  Prior LHC did not reveal CAD    Acute on chronic HFpEF  -Has bilateral pitting edema, plan for IV Lasix 40 mg twice daily.  Patient initially refused IV Lasix, but agreeable to continue today.     # COPD  -Continue home inhalers     # History of DVT  -Continue Xarelto     # Hypertension  -Continue metoprolol     # Chronic pain  -On Norco  -PDMP reviewed.  Last prescription  with 84 tablets for 28 days     # Left breast fold fungal infection  -Low suspicion for bacterial mastitis  -Will add Micatin powder for breast folds.      Diet ADULT DIET; Regular   DVT Prophylaxis [] Lovenox, []  Heparin, [] SCDs, [] Ambulation,  [] Eliquis, [] Xarelto  [] Coumadin   Code Status DNR-CC   Disposition From:   Expected Disposition:   Estimated Date of Discharge:   Patient requires continued admission due to    Surrogate Decision Maker/ POA      Personally reviewed Lab Studies and Imaging     Subjective:     Chief Complaint: Abdominal Pain (Pt endorses ABD pain; BLLE w/ \"burning sensation\" in the feet x 3 months)       Negro Moody is a 84 y.o. female who presents with chest pain.    Seen and examined in the morning.  She states she does not feel too great.  She is worried she will be discharged today.  She still has significant bilateral pitting edema and reassured we are continuing IV Lasix today.

## 2025-03-20 NOTE — DISCHARGE INSTR - COC
Continuity of Care Form    Patient Name: Negro Moody   :  1940  MRN:  [id number]    Admit date:  3/18/2025  Discharge date:  3/20/2025    Code Status Order: DNR-CC   Advance Directives:     Admitting Physician:  Hamida Pennington MD  PCP: Herbert Goode MD    Discharging Nurse: Winsome Cabral RN   Discharging Hospital Unit/Room#: 3022/3022-A  Discharging Unit Phone Number: [phone]    Emergency Contact:   Extended Emergency Contact Information  Primary Emergency Contact: Fab Moody  Home Phone: [phone]  Relation: Child  Secondary Emergency Contact: Marleni Harvey  Home Phone: [phone]  Relation: Other    Past Surgical History:  Past Surgical History:   Procedure Laterality Date    ANOMALOUS PULMONARY VENOUS RETURN REPAIR, TOTAL  2023    CARDIAC SURGERY       SECTION      COLONOSCOPY      ENDOSCOPY, COLON, DIAGNOSTIC         Immunization History:   Immunization History   Administered Date(s) Administered    COVID-19, MODERNA BLUE border, Primary or Immunocompromised, (age 12y+), IM, 100 mcg/0.5mL 2021, 2021    COVID-19, PFIZER, , (age 12y+), IM, 30mcg/0.3mL 2024    Influenza Virus Vaccine 2014, 2019    Influenza, FLUAD, (age 65 y+), IM, Quadv, 0.5mL 2022    Pneumococcal, PPSV23, PNEUMOVAX 23, (age 2y+), SC/IM, 0.5mL 2014       Active Problems:  Patient Active Problem List   Diagnosis Code    Coumadin toxicity T45.511A    Precordial pain R07.2    Severe aortic stenosis I35.0    VHD (valvular heart disease) I38    H/O deep venous thrombosis Z86.718    SOB (shortness of breath) R06.02    History of pulmonary artery thrombosis Z86.711    Primary hypertension I10    Chest pain R07.9    Chronic obstructive pulmonary disease (HCC) J44.9    Nocturnal oxygen desaturation G47.34    Generalized abdominal pain R10.84    Acute exacerbation of chronic heart failure (HCC) I50.9       Isolation/Infection:   Isolation            No  Isolation          Patient Infection Status    None to display         Nurse Assessment:  Last Vital Signs: /65   Pulse 73   Temp 97.8 °F (36.6 °C) (Oral)   Resp 17   Ht 1.803 m (5' 11\")   Wt 105.2 kg (232 lb)   SpO2 100%   BMI 32.36 kg/m²     Last documented pain score (0-10 scale): Pain Level: 2  Last Weight:   Wt Readings from Last 1 Encounters:   25 105.2 kg (232 lb)     Mental Status:  oriented, alert, coherent, logical, and thought processes intact    IV Access:  - None    Nursing Mobility/ADLs:  Walking   Assisted  Transfer  Assisted  Bathing  Assisted  Dressing  Independent  Toileting  Assisted  Feeding  Independent  Med Admin  Independent  Med Delivery   whole    Wound Care Documentation and Therapy:        Elimination:  Continence:   Bowel: Yes  Bladder: No  Urinary Catheter: None   Colostomy/Ileostomy/Ileal Conduit: No       Date of Last BM: ***  No intake or output data in the 24 hours ending 25 1230  No intake/output data recorded.    Safety Concerns:     { LILI Safety Concerns:064958141}    Impairments/Disabilities:      { LILI Impairments/Disabilities:004377310}    Nutrition Therapy:  Current Nutrition Therapy:   { LILI Diet List:746972594}    Routes of Feeding: {[facility] DME Other Feedings:429187694}  Liquids: {Slp liquid thickness:02113}  Daily Fluid Restriction: {[facility] DME Yes amt example:540418175}  Last Modified Barium Swallow with Video (Video Swallowing Test): {Done Not Done Date:}    Treatments at the Time of Hospital Discharge:   Respiratory Treatments: ***  Oxygen Therapy:  {Therapy; copd oxygen:41293}  Ventilator:    { CC Vent List:080367422}    Rehab Therapies: {THERAPEUTIC INTERVENTION:3131673621}  Weight Bearing Status/Restrictions: {[facility] Weight Bearin}  Other Medical Equipment (for information only, NOT a DME order):  {EQUIPMENT:748839065}  Other Treatments: ***    Patient's personal belongings (please select all that are sent with

## 2025-03-20 NOTE — PROGRESS NOTES
Pt refused her evening dose of Lasix per night shift RN. She also declined to have her IV replaced and she does not wish to take any diuretics.     I discussed with patient at length that she has not been cleared for discharge yet and she is being treated for acute on chronic diastolic heart failure. Lasix IV treatment is strongly recommended by cardiology team.     Pt verbalized her understanding and agreed to resume IV Lasix. Victoriano MARTINEZ notified.

## 2025-03-20 NOTE — PROGRESS NOTES
Nursing staff informed me that patient lost IV access. She is refusing another IV.    Switch IV lasix to oral for the time being. Patient is hesitant to take any diuretic at this time. Will emphasize importance of diuretics in setting of CHF.    Lamont Mooney PA-C 03/20/25 9:50 AM

## 2025-03-21 VITALS
SYSTOLIC BLOOD PRESSURE: 134 MMHG | HEIGHT: 71 IN | HEART RATE: 133 BPM | DIASTOLIC BLOOD PRESSURE: 64 MMHG | OXYGEN SATURATION: 93 % | RESPIRATION RATE: 16 BRPM | BODY MASS INDEX: 32.48 KG/M2 | TEMPERATURE: 97.9 F | WEIGHT: 232 LBS

## 2025-03-21 LAB
ANION GAP SERPL CALCULATED.3IONS-SCNC: 10 MMOL/L (ref 9–17)
BUN SERPL-MCNC: 12 MG/DL (ref 7–20)
CALCIUM SERPL-MCNC: 8.6 MG/DL (ref 8.3–10.6)
CHLORIDE SERPL-SCNC: 102 MMOL/L (ref 99–110)
CO2 SERPL-SCNC: 29 MMOL/L (ref 21–32)
CREAT SERPL-MCNC: 0.8 MG/DL (ref 0.6–1.2)
EKG ATRIAL RATE: 104 BPM
EKG DIAGNOSIS: NORMAL
EKG P AXIS: 74 DEGREES
EKG Q-T INTERVAL: 488 MS
EKG QRS DURATION: 118 MS
EKG QTC CALCULATION (BAZETT): 470 MS
EKG R AXIS: 15 DEGREES
EKG T AXIS: -42 DEGREES
EKG VENTRICULAR RATE: 56 BPM
GFR, ESTIMATED: 67 ML/MIN/1.73M2
GLUCOSE SERPL-MCNC: 100 MG/DL (ref 74–99)
MAGNESIUM SERPL-MCNC: 1.7 MG/DL (ref 1.8–2.4)
POTASSIUM SERPL-SCNC: 3.5 MMOL/L (ref 3.5–5.1)
SODIUM SERPL-SCNC: 140 MMOL/L (ref 136–145)

## 2025-03-21 PROCEDURE — 36415 COLL VENOUS BLD VENIPUNCTURE: CPT

## 2025-03-21 PROCEDURE — 80048 BASIC METABOLIC PNL TOTAL CA: CPT

## 2025-03-21 PROCEDURE — 6360000002 HC RX W HCPCS: Performed by: INTERNAL MEDICINE

## 2025-03-21 PROCEDURE — 94761 N-INVAS EAR/PLS OXIMETRY MLT: CPT

## 2025-03-21 PROCEDURE — 5A12012 PERFORMANCE OF CARDIAC OUTPUT, SINGLE, MANUAL: ICD-10-PCS | Performed by: STUDENT IN AN ORGANIZED HEALTH CARE EDUCATION/TRAINING PROGRAM

## 2025-03-21 PROCEDURE — 93010 ELECTROCARDIOGRAM REPORT: CPT | Performed by: INTERNAL MEDICINE

## 2025-03-21 PROCEDURE — 83735 ASSAY OF MAGNESIUM: CPT

## 2025-03-21 RX ORDER — AMIODARONE HYDROCHLORIDE 150 MG/3ML
INJECTION, SOLUTION INTRAVENOUS
Status: COMPLETED | OUTPATIENT
Start: 2025-03-21 | End: 2025-03-21

## 2025-03-21 RX ORDER — MAGNESIUM SULFATE IN WATER 40 MG/ML
2000 INJECTION, SOLUTION INTRAVENOUS ONCE
Status: DISCONTINUED | OUTPATIENT
Start: 2025-03-21 | End: 2025-03-21

## 2025-03-21 RX ORDER — EPINEPHRINE IN SOD CHLOR,ISO 1 MG/10 ML
SYRINGE (ML) INTRAVENOUS
Status: COMPLETED | OUTPATIENT
Start: 2025-03-21 | End: 2025-03-21

## 2025-03-21 RX ADMIN — AMIODARONE HYDROCHLORIDE 300 MG: 50 INJECTION, SOLUTION INTRAVENOUS at 05:48

## 2025-03-21 RX ADMIN — Medication 1 MG: at 05:44

## 2025-03-21 NOTE — DISCHARGE SUMMARY
Discharge Summary    Name:  Negro Moody /Age/Sex: 1940  (84 y.o. female)   MRN & CSN:  [id number] & [id number] Admission Date/Time: 3/18/2025  1:01 PM   Attending:  Hamida Pennington MD Discharging Physician: Hamida Pennington MD         Discharge Diagnosis:  Chest pain  Acute on chronic HFpEF  COPD  History of DVT  Hypertension      Discharge Exam  Physical Exam    The patient did not respond to verbal or physical stimuli. No spontaneous movements were present. There was no response to verbal or tactile stimuli. Pupils were mid-dilated and fixed. No heart sounds were heard throughout the entire precordium. No carotid/radial pulses were palpable. No breath sounds were appreciated over the entire lung filed.     Hospital Course:   Negro Moody is a 84 y.o.  female  who presents with Chest pain    Patient had a CODE BLUE called on 3/21/2025 early morning and passed away due to cardiopulmonary arrest.        # Chest pain, likely musculoskeletal  # Status post TAVR  -Reports left-sided chest pain  -Troponins on presentation 25->26  -EKG normal sinus rhythm, T wave abnormalities  -Cardiology consulted--underwent stress and echocardiogram.  -Stress test WNL.  Prior LHC did not reveal CAD     Acute on chronic HFpEF  -Has bilateral pitting edema, plan for IV Lasix 40 mg twice daily.  Patient initially refused IV Lasix, but agreeable to continue  # COPD  -home inhalers     # History of DVT  -On Xarelto      # Hypertension  -on metoprolol      # Chronic pain  -On Norco       # Left breast fold fungal infection  -Low suspicion for bacterial mastitis  -Will add Micatin powder for breast folds.    The patient expressed appropriate understanding of and agreement with the discharge recommendations, medications, and plan.     Consults this admission:  IP CONSULT TO CARDIOLOGY      Discharge Instruction:          Discharge Medications:        Medication List        ASK your doctor about these medications      *

## 2025-03-21 NOTE — PROGRESS NOTES
25 1000   Encounter Summary   Encounter Overview/Reason Grief, Loss, and Adjustments   Encounter Code  Assessment by  services   Service Provided For Patient and family together   Referral/Consult From Nurse   Support System Family members   Last Encounter  25  (This Pt  unexpectedly, I provided grief support for the family, the son shared his pain, they wanted prayer, they expressed thanks for the visit, will continue support as needed.)   Complexity of Encounter Moderate   Begin Time 0921   End Time  1001   Total Time Calculated 40 min   Spiritual/Emotional needs   Type Emotional Distress   Grief, Loss, and Adjustments   Type Grief and loss   Assessment/Intervention/Outcome   Assessment Anxious;Concerns with suffering;Despair;Sad;Tearful;Stress overload   Intervention Active listening;Discussed illness injury and it’s impact;Explored/Affirmed feelings, thoughts, concerns;Grief Care;Prayer (assurance of)/[place];Sustaining Presence/Ministry of presence   Outcome Comfort;Coping;Grieving;Venting emotion   Plan and Referrals   Plan/Referrals Continue Support (comment)

## 2025-03-21 NOTE — PROGRESS NOTES
CODE BLUE called overhead. Upon arrival CPR in progress with patient showing vfib on monitor. ACLS protocol followed until it was noted patient is in fact DNRCC. All resuscitation ceased at that time at provider's order. Patient family called by the provider and current situation discussed. Patient remained on telemetry in vfib with no respiratory effort until Asystole noted and TOD called.     Tom Cano   Rapid Resource RN   [facility] [phone]

## 2025-03-21 NOTE — DEATH NOTES
Death Pronouncement Note  Patient's Name: Negro Moody   Patient's YOB: 1940  MRN Number: [id number]    Admitting Provider: Hamida Pennington MD  Attending Provider: Hamida Pennington MD    Paged by nursing staff to pronounce that patient, Ms. Negro Moody, had . On exam the patient did not respond to verbal or physical stimuli. No spontaneous movements were present. There was no response to verbal or tactile stimuli. Pupils were mid-dilated and fixed. No heart sounds were heard throughout the entire precordium. No carotid/radial pulses were palpable. No breath sounds were appreciated over the entire lung filed.     Time of death: 5:59 AM on 3/21/25.     Family notified at bedside and all questions/concerns were addressed in detail, condolences were given.     Preliminary cause of death is cardiopulmonary arrest.    Kendra López MD  3/21/25
